# Patient Record
Sex: FEMALE | Race: WHITE | NOT HISPANIC OR LATINO | Employment: OTHER | URBAN - METROPOLITAN AREA
[De-identification: names, ages, dates, MRNs, and addresses within clinical notes are randomized per-mention and may not be internally consistent; named-entity substitution may affect disease eponyms.]

---

## 2017-01-30 ENCOUNTER — TELEPHONE (OUTPATIENT)
Dept: MEDICAL GROUP | Facility: MEDICAL CENTER | Age: 62
End: 2017-01-30

## 2017-01-31 RX ORDER — AZITHROMYCIN 500 MG/1
TABLET, FILM COATED ORAL
Qty: 3 TAB | Refills: 1 | Status: SHIPPED | OUTPATIENT
Start: 2017-01-31 | End: 2018-03-01 | Stop reason: SDUPTHER

## 2017-01-31 NOTE — TELEPHONE ENCOUNTER
Was the patient seen in the last year in this department? Yes     Does patient have an active prescription for medications requested? No     Received Request Via: Pharmacy     Patient called and left a message stating that she is having dental work done and is hoping to get a prescription for an antibiotic, please. She is allergic to PCN

## 2017-03-13 ENCOUNTER — TELEPHONE (OUTPATIENT)
Dept: MEDICAL GROUP | Facility: MEDICAL CENTER | Age: 62
End: 2017-03-13

## 2017-03-14 NOTE — TELEPHONE ENCOUNTER
Is patient asking for an order for routine screening mammogram? Because I see there is an order from Dr. Carrasco done in September 2016 and I am not sure if it has been completed.  If she is asking for a diagnostic mammogram, there needs to be a suspicious lump or lesion or any other significant concern. I did not find anything like that in her chart.  If she has any concern for a lump in etc. she needs to be seen in clinic first.  Please clarify with patient.      Michael Arnold M.D.    Covering for Dr. Carrasco

## 2017-03-27 NOTE — TELEPHONE ENCOUNTER
Spoke with patient about Diagnostic Mammo order and advised Dr. Carrasco ordered a mammogram back in September. She states that she never had that mammo done in September, will fax the order for her to done.

## 2017-04-17 ENCOUNTER — HOSPITAL ENCOUNTER (OUTPATIENT)
Dept: RADIOLOGY | Facility: MEDICAL CENTER | Age: 62
End: 2017-04-17
Attending: FAMILY MEDICINE
Payer: COMMERCIAL

## 2017-04-17 DIAGNOSIS — Z13.9 SCREENING: ICD-10-CM

## 2017-04-17 PROCEDURE — 77063 BREAST TOMOSYNTHESIS BI: CPT

## 2017-05-09 ENCOUNTER — OFFICE VISIT (OUTPATIENT)
Dept: MEDICAL GROUP | Facility: MEDICAL CENTER | Age: 62
End: 2017-05-09
Payer: COMMERCIAL

## 2017-05-09 ENCOUNTER — HOSPITAL ENCOUNTER (OUTPATIENT)
Facility: MEDICAL CENTER | Age: 62
End: 2017-05-09
Attending: FAMILY MEDICINE
Payer: COMMERCIAL

## 2017-05-09 VITALS
WEIGHT: 196 LBS | HEIGHT: 65 IN | DIASTOLIC BLOOD PRESSURE: 76 MMHG | BODY MASS INDEX: 32.65 KG/M2 | OXYGEN SATURATION: 97 % | TEMPERATURE: 97.6 F | SYSTOLIC BLOOD PRESSURE: 122 MMHG | HEART RATE: 76 BPM

## 2017-05-09 DIAGNOSIS — Z12.4 SCREENING FOR CERVICAL CANCER: ICD-10-CM

## 2017-05-09 DIAGNOSIS — Z12.11 SCREENING FOR COLON CANCER: ICD-10-CM

## 2017-05-09 DIAGNOSIS — Z01.419 WELL WOMAN EXAM WITH ROUTINE GYNECOLOGICAL EXAM: ICD-10-CM

## 2017-05-09 DIAGNOSIS — E66.9 OBESITY (BMI 30-39.9): ICD-10-CM

## 2017-05-09 PROCEDURE — 99396 PREV VISIT EST AGE 40-64: CPT | Performed by: FAMILY MEDICINE

## 2017-05-09 PROCEDURE — 88175 CYTOPATH C/V AUTO FLUID REDO: CPT

## 2017-05-09 PROCEDURE — 87624 HPV HI-RISK TYP POOLED RSLT: CPT

## 2017-05-09 NOTE — MR AVS SNAPSHOT
"        Corrinne L Amador   2017 9:40 AM   Office Visit   MRN: [id number]    Department:  [facility]   Dept Phone:  [phone]    Description:  Female : 1955   Provider:  Silvia Carrasco M.D.           Reason for Visit     Gynecologic Exam           Allergies as of 2017     Allergen Noted Reactions    Gluten 12/15/2012       GI upset    Pcn [Penicillins] 12/15/2012   Rash    Tolerates Ancef.    Percocet [Oxycodone-Acetaminophen] 12/15/2012   Unspecified    migraine  migraine      You were diagnosed with     Well woman exam with routine gynecological exam   [541406]       Screening for cervical cancer   [553985]       Screening for colon cancer   [519809]       Obesity (BMI 30-39.9)   [670433]         Vital Signs     Blood Pressure Pulse Temperature Height Weight Body Mass Index    122/76 mmHg 76 36.4 °C (97.6 °F) 1.651 m (5' 5\") 88.905 kg (196 lb) 32.62 kg/m2    Oxygen Saturation Breastfeeding? Smoking Status             97% No Former Smoker         Basic Information     Date Of Birth Sex Race Ethnicity Preferred Language    1955 Female White Non- English      Your appointments     May 23, 2017 10:00 AM   DEXA/CONSULT-45 MIN with Shahab Fink M.D., [facility] (--)    [address] [place] [id number] [phone]              Problem List              ICD-10-CM Priority Class Noted - Resolved    Shoulder impingement M75.40   2014 - Present    S/P rotator cuff repair Z98.890   2014 - Present    Chest pain R07.9   7/10/2014 - Present    Hypokalemia E87.6   2014 - Present    Asthma J45.909   2014 - Present    Hypothyroid E03.9   2014 - Present    Knee osteoarthritis M17.10   2014 - Present    Actinic keratosis L57.0   2016 - Present    Endometriosis of vagina N80.4   2016 - Present    Lymphocytic thyroiditis E06.3   2016 - Present    Renal colic N23   2016 - Present   " Hypothyroidism E03.9   9/19/2016 - Present    Vitamin D deficiency E55.9   9/19/2016 - Present    Hyperlipidemia E78.5   9/19/2016 - Present    Changing pigmented skin lesion D22.9   9/19/2016 - Present    Migraine without aura and without status migrainosus, not intractable G43.009   9/19/2016 - Present    HSV infection B00.9   9/19/2016 - Present    Obesity (BMI 30-39.9) E66.9   5/9/2017 - Present      Health Maintenance        Date Due Completion Dates    IMM DTaP/Tdap/Td Vaccine (1 - Tdap) 2/6/1975 ---    PAP SMEAR 2/6/1977 ---    COLONOSCOPY 2/6/2006 ---    IMM ZOSTER VACCINE 2/6/2016 ---    MAMMOGRAM 4/17/2018 4/17/2017            Current Immunizations     13-VALENT PCV PREVNAR 10/25/2015  1:00 PM    Influenza TIV (IM) 12/11/2013    Influenza Vaccine Quad Inj (Preserved) 10/25/2016    Pneumococcal polysaccharide vaccine (PPSV-23) 7/11/2005      Below and/or attached are the medications your provider expects you to take. Review all of your home medications and newly ordered medications with your provider and/or pharmacist. Follow medication instructions as directed by your provider and/or pharmacist. Please keep your medication list with you and share with your provider. Update the information when medications are discontinued, doses are changed, or new medications (including over-the-counter products) are added; and carry medication information at all times in the event of emergency situations     Allergies:  GLUTEN - (reactions not documented)     PCN - Rash     PERCOCET - Unspecified               Medications  Valid as of: May 09, 2017 - 10:40 AM    Generic Name Brand Name Tablet Size Instructions for use    Acyclovir (Tab) ZOVIRAX 400 MG Take 1 Tab by mouth 3 times a day.        Albuterol Sulfate (Aero Soln) albuterol 108 (90 BASE) MCG/ACT Inhale 2 Puffs by mouth every four hours as needed for Shortness of Breath (cough and wheezing).        Azithromycin (Tab) ZITHROMAX 500 MG One po 30 minutes prior to  dental procedure        Calcium Citrate-Vitamin D   Take  by mouth.        Cholecalciferol (Tab) cholecalciferol 1000 UNIT Take 2,000 Units by mouth every day.        Levothyroxine Sodium (Tab) SYNTHROID 150 MCG Take 1 Tab by mouth Every morning on an empty stomach.        SUMAtriptan Succinate (Tab) IMITREX 50 MG Take 1 Tab by mouth Once PRN for Migraine for up to 1 dose.        .                 Medicines prescribed today were sent to:     [facility] [place], [place] - [address]    [address] [place] [address]    Phone: [phone] Fax: [phone]    Open 24 Hours?: No      Medication refill instructions:       If your prescription bottle indicates you have medication refills left, it is not necessary to call your provider’s office. Please contact your pharmacy and they will refill your medication.    If your prescription bottle indicates you do not have any refills left, you may request refills at any time through one of the following ways: The online [employer] system (except Urgent Care), by calling your provider’s office, or by asking your pharmacy to contact your provider’s office with a refill request. Medication refills are processed only during regular business hours and may not be available until the next business day. Your provider may request additional information or to have a follow-up visit with you prior to refilling your medication.   *Please Note: Medication refills are assigned a new Rx number when refilled electronically. Your pharmacy may indicate that no refills were authorized even though a new prescription for the same medication is available at the pharmacy. Please request the medicine by name with the pharmacy before contacting your provider for a refill.        Your To Do List     Future Labs/Procedures Complete By Expires    THINPREP PAP WITH HPV  As directed 5/10/2018      Referral     A referral request has been sent to our patient care coordination  department. Please allow 3-5 business days for us to process this request and contact you either by phone or mail. If you do not hear from us by the 5th business day, please call us at [phone].           [employer] Access Code: Activation code not generated  Current [employer] Status: Active

## 2017-05-09 NOTE — PROGRESS NOTES
SUBJECTIVE:   Chief Complaint   Patient presents with   • Gynecologic Exam       62 y.o. female for annual routine gynecologic exam    Obstetric History       T0      TAB0   SAB0   E0   M0   L1       History   Sexual Activity   • Sexual Activity:   • Partners: Male       Last Pap: 5 years ago  HPV testing unknown  H/O Abnormal Pap no  No significant bloating/fluid retention, pelvic pain, or dyspareunia. No vaginal discharge   She does perform regular self breast exams.  No breast tenderness, mass, nipple discharge, changes in size or contour, or abnormal cyclic discomfort.        ROS:    No urinary tract symptoms, no incontinence.   No abdominal pain, change in bowel habits, black or bloody stools.    No unusual headaches, no visual changes, menstrual migraines   No prolonged cough. No dyspnea or chest pain on exertion.  No depression, labile mood, anxiety ,libido changes, insomnia.  No new/concerning skin lesions  Had an ankle fracture in February but is doing better    Exercise: moderate regular exercise program    Social History   Substance Use Topics   • Smoking status: Former Smoker -- 0.25 packs/day for 10 years     Types: Cigarettes     Quit date: 1980   • Smokeless tobacco: Never Used   • Alcohol Use: 2.5 oz/week     5 Standard drinks or equivalent per week      Comment: occas       Patient Active Problem List    Diagnosis Date Noted   • Obesity (BMI 30-39.9) 2017   • Actinic keratosis 2016   • Endometriosis of vagina 2016   • Lymphocytic thyroiditis 2016   • Renal colic 2016   • Hypothyroidism 2016   • Vitamin D deficiency 2016   • Hyperlipidemia 2016   • Changing pigmented skin lesion 2016   • Migraine without aura and without status migrainosus, not intractable 2016   • HSV infection 2016   • Knee osteoarthritis 2014   • Hypokalemia 2014   • Asthma 2014   • Hypothyroid 2014   • Chest pain  07/10/2014   • S/P rotator cuff repair 2014   • Shoulder impingement 2014       Past Medical History   Diagnosis Date   • ASTHMA    • Hyperthyroidism    • Arthritis    • Psychiatric problem    • Renal disorder    • Chest pain 7/10/2014   • Anesthesia      wakes up manic   • Breath shortness      asthma related   • Migraine without aura and without status migrainosus, not intractable 2016   • Asthma 2014   • Endometriosis of vagina 2016     Overview:  AREA NOT SPECIFIED    • H/O migraine 2014   • HSV infection 2016   • Hyperlipidemia 2016   • Lymphocytic thyroiditis 2016   • Knee osteoarthritis 2014   • Renal colic 2016   • Rotator cuff tear 2014   • Vitamin D deficiency 2016     Past Surgical History   Procedure Laterality Date   • Shoulder arthroscopy     • Hannah by laparoscopy     • Pr anesth,yudelka del after neuraxial anesth     • Knee arthroplasty total  2014     Performed by Leeroy Camacho M.D. at SURGERY [facility]   • Knee arthroplasty total Left 10/23/2015     Procedure: LEFT TOTAL KNEE REPLACEMENT;  Surgeon: Leeroy Camacho M.D.;  Location: SURGERY [facility];  Service:          Family History   Problem Relation Age of Onset   • Diabetes Mother    • Arthritis Mother    • Lung Disease Mother      COPD   • Cancer Mother      cervical   • Heart Disease Father    • Alcohol/Drug Father    • Cancer Sister      thyroid   • Heart Disease Brother      Family Status   Relation Status Death Age   • Mother     • Father     • Sister Alive    • Brother Alive    • Sister Alive    • Sister Alive          Current medicines (including changes today)  Current Outpatient Prescriptions   Medication Sig Dispense Refill   • Calcium Citrate-Vitamin D (CALCIUM + D PO) Take  by mouth.     • levothyroxine (SYNTHROID) 150 MCG Tab Take 1 Tab by mouth Every morning on an empty stomach. 90 Tab 3   • acyclovir (ZOVIRAX) 400 MG tablet Take 1 Tab by mouth 3  "times a day. 30 Tab 3   • sumatriptan (IMITREX) 50 MG Tab Take 1 Tab by mouth Once PRN for Migraine for up to 1 dose. 10 Tab 3   • vitamin D (CHOLECALCIFEROL) 1000 UNIT Tab Take 2,000 Units by mouth every day.     • albuterol (VENTOLIN OR PROVENTIL) 108 (90 BASE) MCG/ACT AERS inhalation aerosol Inhale 2 Puffs by mouth every four hours as needed for Shortness of Breath (cough and wheezing). 1 Inhaler 1   • azithromycin (ZITHROMAX) 500 MG tablet One po 30 minutes prior to dental procedure 3 Tab 1     No current facility-administered medications for this visit.           Allergies: Gluten; Pcn; and Percocet     Preventive Care:  Health Maintenance Topics with due status: Overdue       Topic Date Due    PFT SCREENING-FEV1 AND FEV/FVC RATIO / SPIROMETRY SHOULD BE PERFORMED ANNUALLY 02/06/1974    IMM DTaP/Tdap/Td Vaccine 02/06/1975    PAP SMEAR 02/06/1977    COLONOSCOPY 02/06/2006    IMM ZOSTER VACCINE 02/06/2016       OBJECTIVE:   /76 mmHg  Pulse 76  Temp(Src) 36.4 °C (97.6 °F)  Ht 1.651 m (5' 5\")  Wt 88.905 kg (196 lb)  BMI 32.62 kg/m2  SpO2 97%  Breastfeeding? No  Body mass index is 32.62 kg/(m^2).      Gen: Well developed, well nourished in no acute distress.   Skin: Pink, warm, and dry. No rashes  Eyes: conjunctiva non-injected, sclera non-icteric. EOMs intact.   Nasal mucosa without edema nor erythema. No facial tenderness  Ears:Pinna normal. TM pearly gray.   Nose: Nares patent.  No discharge.  Oral mucous membranes pink and moist with no lesions.  Neck: Supple, trachea midline. No adenopathy or masses in the neck or supraclavicular regions. No thyromegaly.  Lungs: Effort is normal. Clear to auscultation bilaterally with good excursion.  CV: regular rate and rhythm. No murmur.  Abdomen: soft, nontender, + BS. No HSM.  No CVAT  Ext: no edema, color normal, vascularity normal, temperature normal  Alert and oriented Eye contact is good, speech goal directed, affect calm     Breast Exam: Performed with " instruction during examination. No axillary lymphadenopathy, no skin changes, no dominant masses. No nipple retraction  Pelvic Exam:  Normal external genitalia with no lesions. Normal vaginal mucosa with normal rugation and no discharge. Cervix with no visible lesions. No cervical motion tenderness. Uterus is normal sized with no masses. No adnexal tenderness or enlargement appreciated. Pap is obtained and the specimen was sent to lab  Rectal: Good tone, heme negative. No masses.    <ASSESSMENT and PLAN>  1. Well woman exam with routine gynecological exam     2. Screening for cervical cancer  THINPREP PAP WITH HPV   3. Screening for colon cancer  REFERRAL TO GASTROENTEROLOGY   4. Obesity (BMI 30-39.9)  Patient identified as having weight management issue.  Appropriate orders and counseling given.       Discussed  breast self exam, menopause, osteoporosis, adequate intake of calcium and vitamin D, diet and exercise     Follow-up in 1 years for next Gyn exam and 3 years for next Pap.   Return in about 1 year (around 5/9/2018).

## 2017-05-11 LAB
CYTOLOGY REG CYTOL: NORMAL
HPV HR 12 DNA CVX QL NAA+PROBE: NEGATIVE
HPV16 DNA SPEC QL NAA+PROBE: NEGATIVE
HPV18 DNA SPEC QL NAA+PROBE: NEGATIVE
SPECIMEN SOURCE: NORMAL

## 2017-09-07 DIAGNOSIS — J20.9 ACUTE BRONCHITIS WITH BRONCHOSPASM: ICD-10-CM

## 2017-09-07 RX ORDER — ALBUTEROL SULFATE 90 UG/1
2 AEROSOL, METERED RESPIRATORY (INHALATION) EVERY 4 HOURS PRN
Qty: 1 INHALER | Refills: 0 | Status: SHIPPED | OUTPATIENT
Start: 2017-09-07 | End: 2018-10-09 | Stop reason: SDUPTHER

## 2017-10-10 DIAGNOSIS — E03.9 ACQUIRED HYPOTHYROIDISM: ICD-10-CM

## 2017-10-11 RX ORDER — LEVOTHYROXINE SODIUM 0.15 MG/1
150 TABLET ORAL
Qty: 90 TAB | Refills: 1 | Status: SHIPPED | OUTPATIENT
Start: 2017-10-11 | End: 2018-04-05 | Stop reason: SDUPTHER

## 2017-10-18 DIAGNOSIS — B00.9 HSV INFECTION: ICD-10-CM

## 2017-10-19 RX ORDER — ACYCLOVIR 400 MG/1
400 TABLET ORAL 3 TIMES DAILY
Qty: 30 TAB | Refills: 3 | Status: SHIPPED | OUTPATIENT
Start: 2017-10-19 | End: 2018-12-21 | Stop reason: SDUPTHER

## 2018-03-01 RX ORDER — AZITHROMYCIN 500 MG/1
TABLET, FILM COATED ORAL
Qty: 3 TAB | Refills: 1 | Status: SHIPPED
Start: 2018-03-01 | End: 2020-05-14

## 2018-03-01 NOTE — TELEPHONE ENCOUNTER
Was the patient seen in the last year in this department? Yes     Does patient have an active prescription for medications requested? No     Received Request Via: Patient     Pt is having Dental work on Monday and is needing this to pre-treat. Pt had updated her pharmacy on file, but Rx went to previous pharmacy on file.

## 2018-04-05 DIAGNOSIS — E03.9 ACQUIRED HYPOTHYROIDISM: ICD-10-CM

## 2018-04-05 RX ORDER — LEVOTHYROXINE SODIUM 0.15 MG/1
150 TABLET ORAL
Qty: 90 TAB | Refills: 0 | Status: SHIPPED | OUTPATIENT
Start: 2018-04-05 | End: 2018-07-06 | Stop reason: SDUPTHER

## 2018-05-01 ENCOUNTER — TELEPHONE (OUTPATIENT)
Dept: MEDICAL GROUP | Facility: MEDICAL CENTER | Age: 63
End: 2018-05-01

## 2018-05-01 ENCOUNTER — HOSPITAL ENCOUNTER (OUTPATIENT)
Dept: RADIOLOGY | Facility: MEDICAL CENTER | Age: 63
End: 2018-05-01
Attending: NURSE PRACTITIONER
Payer: COMMERCIAL

## 2018-05-01 ENCOUNTER — OFFICE VISIT (OUTPATIENT)
Dept: MEDICAL GROUP | Facility: MEDICAL CENTER | Age: 63
End: 2018-05-01
Payer: COMMERCIAL

## 2018-05-01 VITALS
TEMPERATURE: 97.9 F | HEART RATE: 87 BPM | DIASTOLIC BLOOD PRESSURE: 78 MMHG | HEIGHT: 65 IN | SYSTOLIC BLOOD PRESSURE: 104 MMHG | WEIGHT: 207 LBS | OXYGEN SATURATION: 96 % | BODY MASS INDEX: 34.49 KG/M2

## 2018-05-01 DIAGNOSIS — M25.571 ACUTE RIGHT ANKLE PAIN: ICD-10-CM

## 2018-05-01 PROCEDURE — 73630 X-RAY EXAM OF FOOT: CPT | Mod: RT

## 2018-05-01 PROCEDURE — 73610 X-RAY EXAM OF ANKLE: CPT | Mod: RT

## 2018-05-01 PROCEDURE — 99214 OFFICE O/P EST MOD 30 MIN: CPT | Performed by: NURSE PRACTITIONER

## 2018-05-01 ASSESSMENT — PATIENT HEALTH QUESTIONNAIRE - PHQ9: CLINICAL INTERPRETATION OF PHQ2 SCORE: 0

## 2018-05-01 NOTE — TELEPHONE ENCOUNTER
Please let pt know that the foot and ankle xray does not show a fx. There is OA.  If its going to be several weeks to get into ortho, let me know and we can consider ankle MRI.

## 2018-05-01 NOTE — PROGRESS NOTES
Subjective:     Corrinne L Cibulsky is a 63 y.o. female who presents with rt ankle pain.    HPI:   Seen in f/u for rt ankle pain.  She had sudden onset of pain in the ankle about 1 month ago.  No hx of recent injury.  Did fx in 2/17.  She has been really careful with the foot and ankle since the fx.  Did not need surgery.  It was work comp injury. That had healed.  The pain recently restarted w/o injury.  There is swelling.  The lateral ankle and midfoot is very tender.  Pain with pressure on the foot.  Not getting better.    Otherwise feel well.      Patient Active Problem List    Diagnosis Date Noted   • Obesity (BMI 30-39.9) 05/09/2017   • Actinic keratosis 09/19/2016   • Endometriosis of vagina 09/19/2016   • Lymphocytic thyroiditis 09/19/2016   • Renal colic 09/19/2016   • Hypothyroidism 09/19/2016   • Vitamin D deficiency 09/19/2016   • Hyperlipidemia 09/19/2016   • Changing pigmented skin lesion 09/19/2016   • Migraine without aura and without status migrainosus, not intractable 09/19/2016   • HSV infection 09/19/2016   • Knee osteoarthritis 09/12/2014   • Hypokalemia 07/11/2014   • Asthma 07/11/2014   • Hypothyroid 07/11/2014   • Chest pain 07/10/2014   • S/P rotator cuff repair 02/27/2014   • Shoulder impingement 01/16/2014       Current medicines (including changes today)  Current Outpatient Prescriptions   Medication Sig Dispense Refill   • levothyroxine (SYNTHROID) 150 MCG Tab Take 1 Tab by mouth Every morning on an empty stomach. 90 Tab 0   • azithromycin (ZITHROMAX) 500 MG tablet One po 30 minutes prior to dental procedure 3 Tab 1   • acyclovir (ZOVIRAX) 400 MG tablet Take 1 Tab by mouth 3 times a day. 30 Tab 3   • albuterol 108 (90 Base) MCG/ACT Aero Soln inhalation aerosol Inhale 2 Puffs by mouth every four hours as needed for Shortness of Breath (cough and wheezing). 1 Inhaler 0   • Calcium Citrate-Vitamin D (CALCIUM + D PO) Take  by mouth.     • sumatriptan (IMITREX) 50 MG Tab Take 1 Tab by mouth  "Once PRN for Migraine for up to 1 dose. 10 Tab 3   • vitamin D (CHOLECALCIFEROL) 1000 UNIT Tab Take 2,000 Units by mouth every day.       No current facility-administered medications for this visit.        Allergies   Allergen Reactions   • Gluten      GI upset   • Pcn [Penicillins] Rash     Tolerates Ancef.   • Percocet [Oxycodone-Acetaminophen] Unspecified     migraine  migraine       ROS  Constitutional: Negative. Negative for fever, chills, weight loss, malaise/fatigue and diaphoresis.   HENT: Negative. Negative for hearing loss, ear pain, nosebleeds, congestion, sore throat, neck pain, tinnitus and ear discharge.   Respiratory: Negative. Negative for cough, hemoptysis, sputum production, shortness of breath, wheezing and stridor.   Cardiovascular: Negative. Negative for chest pain, palpitations, orthopnea, claudication, leg swelling and PND.        Objective:     Blood pressure 104/78, pulse 87, temperature 36.6 °C (97.9 °F), height 1.648 m (5' 4.9\"), weight 93.9 kg (207 lb), SpO2 96 %, not currently breastfeeding. Body mass index is 34.55 kg/m².    Physical Exam:  Vitals reviewed.  Constitutional: Oriented to person, place, and time. appears well-developed and well-nourished. No distress.   Cardiovascular: Normal rate, regular rhythm, normal heart sounds and intact distal pulses. Exam reveals no gallop and no friction rub. No murmur heard. No carotid bruits.   Pulmonary/Chest: Effort normal and breath sounds normal. No stridor. No respiratory distress. no wheezes or rales. exhibits no tenderness.   Musculoskeletal: Normal range of motion. exhibits no edema. ankur pedal pulses 2+.  Neurological: Alert and oriented to person, place, and time. exhibits normal muscle tone.  Skin: Skin is warm and dry. No diaphoresis.  Mild edema and tender to palp in rt lateral malleolus and midfoot.  Sensation intact.    Psychiatric: Normal mood and affect. Behavior is normal.      Assessment and Plan:     The following treatment " plan was discussed:    1. Acute right ankle pain  DX-ANKLE 3+ VIEWS RIGHT    DX-FOOT-COMPLETE 3+ RIGHT    REFERRAL TO ORTHOPEDICS    xray rt ankle and foot d/t lateral malleolus and midfoot pain and swelling.  refer ortho.  f/u with pt with all test results.     2. BMI 34.0-34.9,adult  Patient identified as having weight management issue.  Appropriate orders and counseling given.         Followup: Return if symptoms worsen or fail to improve.

## 2018-07-02 DIAGNOSIS — E03.9 ACQUIRED HYPOTHYROIDISM: ICD-10-CM

## 2018-07-05 RX ORDER — LEVOTHYROXINE SODIUM 150 MCG
TABLET ORAL
Qty: 30 TAB | OUTPATIENT
Start: 2018-07-05

## 2018-07-06 DIAGNOSIS — E03.9 ACQUIRED HYPOTHYROIDISM: ICD-10-CM

## 2018-07-08 RX ORDER — LEVOTHYROXINE SODIUM 150 MCG
TABLET ORAL
Qty: 30 TAB | Refills: 0 | Status: SHIPPED | OUTPATIENT
Start: 2018-07-08 | End: 2018-08-05 | Stop reason: SDUPTHER

## 2018-07-11 ENCOUNTER — HOSPITAL ENCOUNTER (OUTPATIENT)
Dept: LAB | Facility: MEDICAL CENTER | Age: 63
End: 2018-07-11
Attending: FAMILY MEDICINE
Payer: COMMERCIAL

## 2018-07-11 LAB
T4 FREE SERPL-MCNC: 1.03 NG/DL (ref 0.53–1.43)
TSH SERPL DL<=0.005 MIU/L-ACNC: 5.51 UIU/ML (ref 0.38–5.33)

## 2018-07-11 PROCEDURE — 36415 COLL VENOUS BLD VENIPUNCTURE: CPT

## 2018-07-11 PROCEDURE — 84439 ASSAY OF FREE THYROXINE: CPT

## 2018-07-11 PROCEDURE — 84443 ASSAY THYROID STIM HORMONE: CPT

## 2018-07-18 ENCOUNTER — OFFICE VISIT (OUTPATIENT)
Dept: MEDICAL GROUP | Facility: MEDICAL CENTER | Age: 63
End: 2018-07-18
Payer: COMMERCIAL

## 2018-07-18 VITALS
DIASTOLIC BLOOD PRESSURE: 70 MMHG | BODY MASS INDEX: 34.66 KG/M2 | HEIGHT: 65 IN | TEMPERATURE: 98.5 F | WEIGHT: 208 LBS | HEART RATE: 82 BPM | OXYGEN SATURATION: 94 % | SYSTOLIC BLOOD PRESSURE: 120 MMHG

## 2018-07-18 DIAGNOSIS — N95.9 POST MENOPAUSAL PROBLEMS: ICD-10-CM

## 2018-07-18 DIAGNOSIS — Z12.31 ENCOUNTER FOR SCREENING MAMMOGRAM FOR MALIGNANT NEOPLASM OF BREAST: ICD-10-CM

## 2018-07-18 DIAGNOSIS — R94.6 ABNORMAL THYROID FUNCTION TEST: ICD-10-CM

## 2018-07-18 DIAGNOSIS — E03.9 HYPOTHYROIDISM (ACQUIRED): ICD-10-CM

## 2018-07-18 DIAGNOSIS — Z86.39 HISTORY OF THYROID NODULE: ICD-10-CM

## 2018-07-18 PROCEDURE — 99214 OFFICE O/P EST MOD 30 MIN: CPT | Performed by: NURSE PRACTITIONER

## 2018-07-18 NOTE — PROGRESS NOTES
Subjective:     Corrinne Lynne Cibulsky is a 63 y.o. female who presents with hypothyroidism.    HPI:   Seen in f/u for hypothyroidism.  She is feeling fatigued.  She is having weight gain.  She is noting dry skin and hair.  These are associated with her thyroid.  She has a hx of small thyroid nodule. No recheck long term.    She just recovered from bronchitis.   Reviewed lab with pt.  Her TSH  Is mildly elevated.  T4 is wnl.    Patient Active Problem List    Diagnosis Date Noted   • Obesity (BMI 30-39.9) 05/09/2017   • Actinic keratosis 09/19/2016   • Endometriosis of vagina 09/19/2016   • Lymphocytic thyroiditis 09/19/2016   • Renal colic 09/19/2016   • Hypothyroidism 09/19/2016   • Vitamin D deficiency 09/19/2016   • Hyperlipidemia 09/19/2016   • Changing pigmented skin lesion 09/19/2016   • Migraine without aura and without status migrainosus, not intractable 09/19/2016   • HSV infection 09/19/2016   • Knee osteoarthritis 09/12/2014   • Hypokalemia 07/11/2014   • Asthma 07/11/2014   • Hypothyroid 07/11/2014   • Chest pain 07/10/2014   • S/P rotator cuff repair 02/27/2014   • Shoulder impingement 01/16/2014       Current medicines (including changes today)  Current Outpatient Prescriptions   Medication Sig Dispense Refill   • SYNTHROID 150 MCG Tab TAKE ONE TABLET BY MOUTH EVERY MORNING ON AN EMPTY STOMACH 30 Tab 0   • azithromycin (ZITHROMAX) 500 MG tablet One po 30 minutes prior to dental procedure 3 Tab 1   • acyclovir (ZOVIRAX) 400 MG tablet Take 1 Tab by mouth 3 times a day. 30 Tab 3   • albuterol 108 (90 Base) MCG/ACT Aero Soln inhalation aerosol Inhale 2 Puffs by mouth every four hours as needed for Shortness of Breath (cough and wheezing). 1 Inhaler 0   • Calcium Citrate-Vitamin D (CALCIUM + D PO) Take  by mouth.     • sumatriptan (IMITREX) 50 MG Tab Take 1 Tab by mouth Once PRN for Migraine for up to 1 dose. 10 Tab 3   • vitamin D (CHOLECALCIFEROL) 1000 UNIT Tab Take 2,000 Units by mouth every day.    "    No current facility-administered medications for this visit.        Allergies   Allergen Reactions   • Gluten      GI upset   • Pcn [Penicillins] Rash     Tolerates Ancef.   • Percocet [Oxycodone-Acetaminophen] Unspecified     migraine  migraine       ROS  Constitutional: Negative. Negative for fever, chills, weight loss, and diaphoresis.   HENT: Negative. Negative for hearing loss, ear pain, nosebleeds, congestion, sore throat, neck pain, tinnitus and ear discharge.   Respiratory: Negative. Negative for cough, hemoptysis, sputum production, shortness of breath, wheezing and stridor.   Cardiovascular: Negative. Negative for chest pain, palpitations, orthopnea, claudication, leg swelling and PND.   Gastrointestinal: Denies nausea, vomiting, diarrhea, constipation, heartburn, melena or hematochezia.  Genitourinary: Denies dysuria, hematuria, urinary incontinence, frequency or urgency.        Objective:     Blood pressure 120/70, pulse 82, temperature 36.9 °C (98.5 °F), height 1.648 m (5' 4.9\"), weight 94.3 kg (208 lb), SpO2 94 %, not currently breastfeeding. Body mass index is 34.72 kg/m².    Physical Exam:  Vitals reviewed.  Constitutional: Oriented to person, place, and time. appears well-developed and well-nourished. No distress.   Cardiovascular: Normal rate, regular rhythm, normal heart sounds and intact distal pulses. Exam reveals no gallop and no friction rub. No murmur heard. No carotid bruits.   Pulmonary/Chest: Effort normal and breath sounds normal. No stridor. No respiratory distress. no wheezes or rales. exhibits no tenderness.   Musculoskeletal: Normal range of motion. exhibits no edema. ankur pedal pulses 2+.  Lymphadenopathy: No cervical or supraclavicular adenopathy.   Neurological: Alert and oriented to person, place, and time. exhibits normal muscle tone.  Skin: Skin is warm and dry. No diaphoresis.   Psychiatric: Normal mood and affect. Behavior is normal.      Assessment and Plan:     The " following treatment plan was discussed:    1. Hypothyroidism (acquired)  TSH+FREE T4    THYROID PEROXIDASE  (TPO) AB    US-SOFT TISSUES OF HEAD - NECK    inc synthroid to 1 tab 6x/wk and 1.5 tab 1x/wk.  recheck lab 2 months.  f/u withpt with results.  do neck us.  f/u for pt with results.    2. Abnormal thyroid function test  TSH+FREE T4    THYROID PEROXIDASE  (TPO) AB    US-SOFT TISSUES OF HEAD - NECK    f/u with PCP as sched   3. History of thyroid nodule  TSH+FREE T4    THYROID PEROXIDASE  (TPO) AB    US-SOFT TISSUES OF HEAD - NECK   4. Encounter for screening mammogram for malignant neoplasm of breast  MA-SCREEN MAMMO W/CAD-BILAT   5. Post menopausal problems  DS-BONE DENSITY STUDY (DEXA)         Followup: Return if symptoms worsen or fail to improve.

## 2018-08-05 DIAGNOSIS — E03.9 ACQUIRED HYPOTHYROIDISM: ICD-10-CM

## 2018-08-07 RX ORDER — LEVOTHYROXINE SODIUM 150 MCG
TABLET ORAL
Qty: 30 TAB | Refills: 3 | Status: SHIPPED | OUTPATIENT
Start: 2018-08-07 | End: 2018-11-27 | Stop reason: SDUPTHER

## 2018-10-02 ENCOUNTER — HOSPITAL ENCOUNTER (OUTPATIENT)
Dept: LAB | Facility: MEDICAL CENTER | Age: 63
End: 2018-10-02
Attending: NURSE PRACTITIONER
Payer: COMMERCIAL

## 2018-10-02 LAB
T4 FREE SERPL-MCNC: 1.33 NG/DL (ref 0.53–1.43)
TSH SERPL DL<=0.005 MIU/L-ACNC: 2.11 UIU/ML (ref 0.38–5.33)

## 2018-10-02 PROCEDURE — 36415 COLL VENOUS BLD VENIPUNCTURE: CPT

## 2018-10-02 PROCEDURE — 84439 ASSAY OF FREE THYROXINE: CPT

## 2018-10-02 PROCEDURE — 86376 MICROSOMAL ANTIBODY EACH: CPT

## 2018-10-02 PROCEDURE — 84443 ASSAY THYROID STIM HORMONE: CPT

## 2018-10-03 ENCOUNTER — TELEPHONE (OUTPATIENT)
Dept: MEDICAL GROUP | Facility: MEDICAL CENTER | Age: 63
End: 2018-10-03

## 2018-10-03 LAB — THYROPEROXIDASE AB SERPL-ACNC: 0.6 IU/ML (ref 0–9)

## 2018-10-09 ENCOUNTER — HOSPITAL ENCOUNTER (OUTPATIENT)
Dept: LAB | Facility: MEDICAL CENTER | Age: 63
End: 2018-10-09
Attending: FAMILY MEDICINE
Payer: COMMERCIAL

## 2018-10-09 ENCOUNTER — OFFICE VISIT (OUTPATIENT)
Dept: MEDICAL GROUP | Facility: MEDICAL CENTER | Age: 63
End: 2018-10-09
Payer: COMMERCIAL

## 2018-10-09 VITALS
BODY MASS INDEX: 33.66 KG/M2 | SYSTOLIC BLOOD PRESSURE: 126 MMHG | DIASTOLIC BLOOD PRESSURE: 68 MMHG | TEMPERATURE: 97.2 F | WEIGHT: 202 LBS | HEIGHT: 65 IN | OXYGEN SATURATION: 96 % | HEART RATE: 89 BPM

## 2018-10-09 DIAGNOSIS — R10.13 EPIGASTRIC PAIN: ICD-10-CM

## 2018-10-09 LAB
AMYLASE SERPL-CCNC: 29 U/L (ref 20–103)
BASOPHILS # BLD AUTO: 0.6 % (ref 0–1.8)
BASOPHILS # BLD: 0.03 K/UL (ref 0–0.12)
EOSINOPHIL # BLD AUTO: 0.09 K/UL (ref 0–0.51)
EOSINOPHIL NFR BLD: 1.9 % (ref 0–6.9)
ERYTHROCYTE [DISTWIDTH] IN BLOOD BY AUTOMATED COUNT: 41.7 FL (ref 35.9–50)
HCT VFR BLD AUTO: 44.6 % (ref 37–47)
HGB BLD-MCNC: 14.8 G/DL (ref 12–16)
IMM GRANULOCYTES # BLD AUTO: 0.01 K/UL (ref 0–0.11)
IMM GRANULOCYTES NFR BLD AUTO: 0.2 % (ref 0–0.9)
LIPASE SERPL-CCNC: 28 U/L (ref 11–82)
LYMPHOCYTES # BLD AUTO: 1.56 K/UL (ref 1–4.8)
LYMPHOCYTES NFR BLD: 32.6 % (ref 22–41)
MCH RBC QN AUTO: 30.6 PG (ref 27–33)
MCHC RBC AUTO-ENTMCNC: 33.2 G/DL (ref 33.6–35)
MCV RBC AUTO: 92.1 FL (ref 81.4–97.8)
MONOCYTES # BLD AUTO: 0.45 K/UL (ref 0–0.85)
MONOCYTES NFR BLD AUTO: 9.4 % (ref 0–13.4)
NEUTROPHILS # BLD AUTO: 2.65 K/UL (ref 2–7.15)
NEUTROPHILS NFR BLD: 55.3 % (ref 44–72)
NRBC # BLD AUTO: 0 K/UL
NRBC BLD-RTO: 0 /100 WBC
PLATELET # BLD AUTO: 219 K/UL (ref 164–446)
PMV BLD AUTO: 10.6 FL (ref 9–12.9)
RBC # BLD AUTO: 4.84 M/UL (ref 4.2–5.4)
WBC # BLD AUTO: 4.8 K/UL (ref 4.8–10.8)

## 2018-10-09 PROCEDURE — 83690 ASSAY OF LIPASE: CPT

## 2018-10-09 PROCEDURE — 36415 COLL VENOUS BLD VENIPUNCTURE: CPT

## 2018-10-09 PROCEDURE — 83013 H PYLORI (C-13) BREATH: CPT

## 2018-10-09 PROCEDURE — 85025 COMPLETE CBC W/AUTO DIFF WBC: CPT

## 2018-10-09 PROCEDURE — 82150 ASSAY OF AMYLASE: CPT

## 2018-10-09 PROCEDURE — 99214 OFFICE O/P EST MOD 30 MIN: CPT | Performed by: FAMILY MEDICINE

## 2018-10-09 RX ORDER — ALBUTEROL SULFATE 90 UG/1
2 AEROSOL, METERED RESPIRATORY (INHALATION) EVERY 4 HOURS PRN
Qty: 1 INHALER | Refills: 0 | Status: SHIPPED | OUTPATIENT
Start: 2018-10-09 | End: 2020-06-02 | Stop reason: SDUPTHER

## 2018-10-09 RX ORDER — OMEPRAZOLE 40 MG/1
40 CAPSULE, DELAYED RELEASE ORAL DAILY
Qty: 30 CAP | Refills: 2 | Status: SHIPPED | OUTPATIENT
Start: 2018-10-09 | End: 2021-11-03

## 2018-10-09 NOTE — ASSESSMENT & PLAN NOTE
New problem.  Pain started 2 months ago but became more severe 2 weeks ago. 6/10 constant pain, 9/10 with food. Pain has woken her up from pain. Using the hot tub helped with pain. + nausea and bloating. Radiating to her back and to lower mid abdomen. Stool has been on and off dark. Drinks about 1-2 glasses of wine daily. Father was hospitalized multiple times with ulcers, but he drank more alcohol than her. She has not used antiacids.

## 2018-10-09 NOTE — PROGRESS NOTES
Subjective:   LYNNE CORINNE CIBULSKY is a 63 y.o. female here today for epigastric pain    Epigastric pain  New problem.  Pain started 2 months ago but became more severe 2 weeks ago. 6/10 constant pain, 9/10 with food. Pain has woken her up from pain. Using the hot tub helped with pain. + nausea and bloating. Radiating to her back and to lower mid abdomen. Stool has been on and off dark. Drinks about 1-2 glasses of wine daily. Father was hospitalized multiple times with ulcers, but he drank more alcohol than her. She has not used antiacids.         Current medicines (including changes today)  Current Outpatient Prescriptions   Medication Sig Dispense Refill   • omeprazole (PRILOSEC) 40 MG delayed-release capsule Take 1 Cap by mouth every day. 30 Cap 2   • albuterol 108 (90 Base) MCG/ACT Aero Soln inhalation aerosol Inhale 2 Puffs by mouth every four hours as needed for Shortness of Breath (cough and wheezing). 1 Inhaler 0   • SYNTHROID 150 MCG Tab TAKE ONE TABLET BY MOUTH EVERY MORNING ON AN EMPTY STOMACH 30 Tab 3   • azithromycin (ZITHROMAX) 500 MG tablet One po 30 minutes prior to dental procedure 3 Tab 1   • acyclovir (ZOVIRAX) 400 MG tablet Take 1 Tab by mouth 3 times a day. 30 Tab 3   • Calcium Citrate-Vitamin D (CALCIUM + D PO) Take  by mouth.     • sumatriptan (IMITREX) 50 MG Tab Take 1 Tab by mouth Once PRN for Migraine for up to 1 dose. 10 Tab 3   • vitamin D (CHOLECALCIFEROL) 1000 UNIT Tab Take 2,000 Units by mouth every day.       No current facility-administered medications for this visit.      She  has a past medical history of Anesthesia; Arthritis; ASTHMA; Asthma (7/11/2014); Breath shortness; Chest pain (7/10/2014); Endometriosis of vagina (9/19/2016); H/O migraine (7/11/2014); HSV infection (9/19/2016); Hyperlipidemia (9/19/2016); Hyperthyroidism; Knee osteoarthritis (9/12/2014); Lymphocytic thyroiditis (9/19/2016); Migraine without aura and without status migrainosus, not intractable (9/19/2016);  "Psychiatric problem; Renal colic (9/19/2016); Renal disorder; Rotator cuff tear (1/30/2014); and Vitamin D deficiency (9/19/2016). She also has no past medical history of Arrhythmia; Bronchitis; Cancer (HCC); Cataract; Cold; Congestive heart failure (HCC); Coughing blood; Dental disorder; Glaucoma; Heart burn; Heart valve disease; Hepatitis A; Hepatitis B; Hepatitis C; High cholesterol; Hypertension; Indigestion; Myocardial infarct (HCC); Other and unspecified angina pectoris; Other emphysema (HCC); Pacemaker; Personal history of venous thrombosis and embolism; Pneumonia; Rheumatic fever; Seizure (HCC); Sleep apnea; Snoring; Tuberculosis; Type II or unspecified type diabetes mellitus without mention of complication, not stated as uncontrolled; Unspecified hemorrhagic conditions; or Unspecified urinary incontinence.    ROS   + dizziness, + nausea, no fever       Objective:     Blood pressure 126/68, pulse 89, temperature 36.2 °C (97.2 °F), height 1.648 m (5' 4.9\"), weight 91.6 kg (202 lb), SpO2 96 %, not currently breastfeeding. Body mass index is 33.72 kg/m².   Physical Exam:  Constitutional: Alert, no distress.  Skin: Warm, dry, good turgor, no rashes in visible areas.  Eye: Equal, round and reactive, conjunctiva clear, lids normal.  Abdomen: Soft, + epigastric tenderness, no hepatosplenomegaly.  Psych: Alert and oriented x3, normal affect and mood.        Assessment and Plan:   The following treatment plan was discussed    1. Epigastric pain  Differential diagnosis includes peptic ulcer versus acute pancreatitis.  We will check lipase and amylase to rule out pancreatitis.  We will also check H. pylori breath test.  Start patient on omeprazole 40 mg daily before dinner.  Check CBC because of dark stools, otherwise vital signs are normal.  If no improvement in symptoms after 2 weeks, please notify us so we can order abdominal ultrasound and GI referral.  - CBC WITH DIFFERENTIAL; Future  - LIPASE; Future  - AMYLASE; " Future  - H. PYLORI BREATH TEST  - omeprazole (PRILOSEC) 40 MG delayed-release capsule; Take 1 Cap by mouth every day.  Dispense: 30 Cap; Refill: 2      Followup: Return if symptoms worsen or fail to improve.

## 2018-10-10 ENCOUNTER — TELEPHONE (OUTPATIENT)
Dept: MEDICAL GROUP | Facility: MEDICAL CENTER | Age: 63
End: 2018-10-10

## 2018-10-10 NOTE — TELEPHONE ENCOUNTER
Patient requesting lab results. She also wants to know how long she should be on a liquid diet for. Please advise.

## 2018-10-10 NOTE — TELEPHONE ENCOUNTER
Please notify patient that her pancreatic enzyme test came back negative, she does not have pancreatitis.  We do not have the H. pylori test yet.  She can increase her diet as tolerated.  She should also continue the omeprazole 40 mg once daily that we prescribed.  Juan Pradhan M.D.

## 2018-10-12 LAB — UREA BREATH TEST QL: NEGATIVE

## 2018-10-15 ENCOUNTER — TELEPHONE (OUTPATIENT)
Dept: MEDICAL GROUP | Facility: MEDICAL CENTER | Age: 63
End: 2018-10-15

## 2018-10-15 NOTE — TELEPHONE ENCOUNTER
----- Message from Juan Pradhan M.D. sent at 10/15/2018  7:53 AM PDT -----  Please notify patient that pancreas levels are normal.  Blood counts are also normal, she is not anemic.  Please check to see if she is improving with the omeprazole we prescribed.  Juan Pradhan M.D.

## 2018-10-15 NOTE — TELEPHONE ENCOUNTER
Phone Number Called: [phone] (home)     Message: Patient notified of results. Pt states the medication is helping with the pain but she is continuing to have dark stools and trouble eating.    Left Message for patient to call back: N\A

## 2018-10-24 ENCOUNTER — TELEPHONE (OUTPATIENT)
Dept: MEDICAL GROUP | Facility: MEDICAL CENTER | Age: 63
End: 2018-10-24

## 2018-10-24 DIAGNOSIS — R10.13 EPIGASTRIC PAIN: ICD-10-CM

## 2018-10-25 NOTE — TELEPHONE ENCOUNTER
Patient reports trouble eating and ongoing abdominal pain still. She is still taking medication but reports no change to symptoms and all tests were negative. Please advise.

## 2018-10-25 NOTE — TELEPHONE ENCOUNTER
I have ordered an abdominal ultrasound and I have placed a referral to GI.  We will call on the ultrasound results. She should her name on the schedule for GI because there might be a wait.  Juan Pradhan M.D.

## 2018-10-31 ENCOUNTER — HOSPITAL ENCOUNTER (OUTPATIENT)
Dept: RADIOLOGY | Facility: MEDICAL CENTER | Age: 63
End: 2018-10-31
Attending: FAMILY MEDICINE
Payer: COMMERCIAL

## 2018-10-31 DIAGNOSIS — R10.13 EPIGASTRIC PAIN: ICD-10-CM

## 2018-10-31 PROCEDURE — 76700 US EXAM ABDOM COMPLETE: CPT

## 2018-11-27 ENCOUNTER — HOSPITAL ENCOUNTER (OUTPATIENT)
Dept: LAB | Facility: MEDICAL CENTER | Age: 63
End: 2018-11-27
Attending: NURSE PRACTITIONER
Payer: COMMERCIAL

## 2018-11-27 DIAGNOSIS — E03.9 ACQUIRED HYPOTHYROIDISM: ICD-10-CM

## 2018-11-27 LAB
BASOPHILS # BLD AUTO: 0.5 % (ref 0–1.8)
BASOPHILS # BLD: 0.03 K/UL (ref 0–0.12)
EOSINOPHIL # BLD AUTO: 0.08 K/UL (ref 0–0.51)
EOSINOPHIL NFR BLD: 1.4 % (ref 0–6.9)
ERYTHROCYTE [DISTWIDTH] IN BLOOD BY AUTOMATED COUNT: 43 FL (ref 35.9–50)
HCT VFR BLD AUTO: 46.8 % (ref 37–47)
HGB BLD-MCNC: 14.8 G/DL (ref 12–16)
IMM GRANULOCYTES # BLD AUTO: 0.01 K/UL (ref 0–0.11)
IMM GRANULOCYTES NFR BLD AUTO: 0.2 % (ref 0–0.9)
LYMPHOCYTES # BLD AUTO: 1.57 K/UL (ref 1–4.8)
LYMPHOCYTES NFR BLD: 28.4 % (ref 22–41)
MCH RBC QN AUTO: 29.5 PG (ref 27–33)
MCHC RBC AUTO-ENTMCNC: 31.6 G/DL (ref 33.6–35)
MCV RBC AUTO: 93.2 FL (ref 81.4–97.8)
MONOCYTES # BLD AUTO: 0.41 K/UL (ref 0–0.85)
MONOCYTES NFR BLD AUTO: 7.4 % (ref 0–13.4)
NEUTROPHILS # BLD AUTO: 3.42 K/UL (ref 2–7.15)
NEUTROPHILS NFR BLD: 62.1 % (ref 44–72)
NRBC # BLD AUTO: 0 K/UL
NRBC BLD-RTO: 0 /100 WBC
PLATELET # BLD AUTO: 188 K/UL (ref 164–446)
PMV BLD AUTO: 11.1 FL (ref 9–12.9)
RBC # BLD AUTO: 5.02 M/UL (ref 4.2–5.4)
WBC # BLD AUTO: 5.5 K/UL (ref 4.8–10.8)

## 2018-11-27 PROCEDURE — 85025 COMPLETE CBC W/AUTO DIFF WBC: CPT

## 2018-11-27 PROCEDURE — 36415 COLL VENOUS BLD VENIPUNCTURE: CPT

## 2018-11-27 PROCEDURE — 80061 LIPID PANEL: CPT

## 2018-11-27 PROCEDURE — 82248 BILIRUBIN DIRECT: CPT

## 2018-11-27 PROCEDURE — 83540 ASSAY OF IRON: CPT

## 2018-11-27 PROCEDURE — 80053 COMPREHEN METABOLIC PANEL: CPT

## 2018-11-27 PROCEDURE — 83550 IRON BINDING TEST: CPT

## 2018-11-27 PROCEDURE — 82728 ASSAY OF FERRITIN: CPT

## 2018-11-27 RX ORDER — LEVOTHYROXINE SODIUM 150 MCG
TABLET ORAL
Qty: 30 TAB | Refills: 5 | Status: SHIPPED | OUTPATIENT
Start: 2018-11-27 | End: 2019-05-14 | Stop reason: SDUPTHER

## 2018-11-28 LAB
ALBUMIN SERPL BCP-MCNC: 4.3 G/DL (ref 3.2–4.9)
ALBUMIN/GLOB SERPL: 1.8 G/DL
ALP SERPL-CCNC: 61 U/L (ref 30–99)
ALT SERPL-CCNC: 15 U/L (ref 2–50)
ANION GAP SERPL CALC-SCNC: 7 MMOL/L (ref 0–11.9)
AST SERPL-CCNC: 11 U/L (ref 12–45)
BILIRUB CONJ SERPL-MCNC: <0.1 MG/DL (ref 0.1–0.5)
BILIRUB INDIRECT SERPL-MCNC: ABNORMAL MG/DL (ref 0–1)
BILIRUB SERPL-MCNC: 0.6 MG/DL (ref 0.1–1.5)
BUN SERPL-MCNC: 16 MG/DL (ref 8–22)
CALCIUM SERPL-MCNC: 9.6 MG/DL (ref 8.5–10.5)
CHLORIDE SERPL-SCNC: 108 MMOL/L (ref 96–112)
CHOLEST SERPL-MCNC: 209 MG/DL (ref 100–199)
CO2 SERPL-SCNC: 29 MMOL/L (ref 20–33)
CREAT SERPL-MCNC: 0.67 MG/DL (ref 0.5–1.4)
FASTING STATUS PATIENT QL REPORTED: NORMAL
FERRITIN SERPL-MCNC: 115.6 NG/ML (ref 10–291)
GLOBULIN SER CALC-MCNC: 2.4 G/DL (ref 1.9–3.5)
GLUCOSE SERPL-MCNC: 86 MG/DL (ref 65–99)
HDLC SERPL-MCNC: 60 MG/DL
IRON SATN MFR SERPL: 43 % (ref 15–55)
IRON SERPL-MCNC: 158 UG/DL (ref 40–170)
LDLC SERPL CALC-MCNC: 124 MG/DL
POTASSIUM SERPL-SCNC: 4.6 MMOL/L (ref 3.6–5.5)
PROT SERPL-MCNC: 6.7 G/DL (ref 6–8.2)
SODIUM SERPL-SCNC: 144 MMOL/L (ref 135–145)
TIBC SERPL-MCNC: 368 UG/DL (ref 250–450)
TRIGL SERPL-MCNC: 123 MG/DL (ref 0–149)

## 2018-12-11 ENCOUNTER — HOSPITAL ENCOUNTER (OUTPATIENT)
Dept: LAB | Facility: MEDICAL CENTER | Age: 63
End: 2018-12-11
Attending: NURSE PRACTITIONER
Payer: COMMERCIAL

## 2018-12-11 PROCEDURE — 36415 COLL VENOUS BLD VENIPUNCTURE: CPT

## 2018-12-11 PROCEDURE — 82784 ASSAY IGA/IGD/IGG/IGM EACH: CPT

## 2018-12-11 PROCEDURE — 83516 IMMUNOASSAY NONANTIBODY: CPT

## 2018-12-12 LAB — IGA SERPL-MCNC: 185 MG/DL (ref 68–408)

## 2018-12-13 LAB — TTG IGA SER IA-ACNC: 1 U/ML (ref 0–3)

## 2018-12-21 DIAGNOSIS — B00.9 HSV INFECTION: ICD-10-CM

## 2018-12-24 RX ORDER — ACYCLOVIR 400 MG/1
TABLET ORAL
Qty: 30 TAB | Refills: 3 | Status: SHIPPED | OUTPATIENT
Start: 2018-12-24 | End: 2019-10-22 | Stop reason: SDUPTHER

## 2019-02-25 ENCOUNTER — HOSPITAL ENCOUNTER (OUTPATIENT)
Dept: RADIOLOGY | Facility: MEDICAL CENTER | Age: 64
End: 2019-02-25
Attending: NURSE PRACTITIONER
Payer: COMMERCIAL

## 2019-02-25 DIAGNOSIS — Z12.31 ENCOUNTER FOR SCREENING MAMMOGRAM FOR MALIGNANT NEOPLASM OF BREAST: ICD-10-CM

## 2019-02-25 PROCEDURE — 77063 BREAST TOMOSYNTHESIS BI: CPT

## 2019-05-14 DIAGNOSIS — E03.9 ACQUIRED HYPOTHYROIDISM: ICD-10-CM

## 2019-05-14 NOTE — TELEPHONE ENCOUNTER
Was the patient seen in the last year in this department? Yes  10/9/18  Does patient have an active prescription for medications requested? No     Received Request Via: Pharmacy

## 2019-05-16 RX ORDER — LEVOTHYROXINE SODIUM 150 MCG
TABLET ORAL
Qty: 90 TAB | Refills: 0 | Status: SHIPPED | OUTPATIENT
Start: 2019-05-16 | End: 2019-08-14 | Stop reason: SDUPTHER

## 2019-07-24 DIAGNOSIS — G43.009 MIGRAINE WITHOUT AURA AND WITHOUT STATUS MIGRAINOSUS, NOT INTRACTABLE: ICD-10-CM

## 2019-07-25 RX ORDER — SUMATRIPTAN 50 MG/1
TABLET, FILM COATED ORAL
Qty: 9 TAB | Refills: 1 | OUTPATIENT
Start: 2019-07-25

## 2019-07-25 NOTE — TELEPHONE ENCOUNTER
I have not seen the patient in over 2 years.  Please have her make an appointment to be seen.  Silvia Carrasco M.D.

## 2019-07-25 NOTE — TELEPHONE ENCOUNTER
Was the patient seen in the last year in this department? Yes10/9/18    Does patient have an active prescription for medications requested? No     Received Request Via: Pharmacy

## 2019-08-14 ENCOUNTER — OFFICE VISIT (OUTPATIENT)
Dept: MEDICAL GROUP | Facility: LAB | Age: 64
End: 2019-08-14
Payer: COMMERCIAL

## 2019-08-14 ENCOUNTER — HOSPITAL ENCOUNTER (OUTPATIENT)
Dept: LAB | Facility: MEDICAL CENTER | Age: 64
End: 2019-08-14
Attending: FAMILY MEDICINE
Payer: COMMERCIAL

## 2019-08-14 VITALS
SYSTOLIC BLOOD PRESSURE: 118 MMHG | BODY MASS INDEX: 29.22 KG/M2 | HEART RATE: 64 BPM | HEIGHT: 65 IN | OXYGEN SATURATION: 98 % | DIASTOLIC BLOOD PRESSURE: 58 MMHG | TEMPERATURE: 97.5 F | WEIGHT: 175.4 LBS

## 2019-08-14 DIAGNOSIS — E06.3 LYMPHOCYTIC THYROIDITIS: ICD-10-CM

## 2019-08-14 DIAGNOSIS — R19.7 DIARRHEA, UNSPECIFIED TYPE: ICD-10-CM

## 2019-08-14 DIAGNOSIS — K27.9 PEPTIC ULCER: ICD-10-CM

## 2019-08-14 DIAGNOSIS — E78.5 DYSLIPIDEMIA: ICD-10-CM

## 2019-08-14 DIAGNOSIS — Z13.1 SCREENING FOR DIABETES MELLITUS: ICD-10-CM

## 2019-08-14 DIAGNOSIS — E03.9 ACQUIRED HYPOTHYROIDISM: ICD-10-CM

## 2019-08-14 DIAGNOSIS — G43.009 MIGRAINE WITHOUT AURA AND WITHOUT STATUS MIGRAINOSUS, NOT INTRACTABLE: ICD-10-CM

## 2019-08-14 LAB
ALBUMIN SERPL BCP-MCNC: 4 G/DL (ref 3.2–4.9)
ALBUMIN/GLOB SERPL: 1.6 G/DL
ALP SERPL-CCNC: 54 U/L (ref 30–99)
ALT SERPL-CCNC: 11 U/L (ref 2–50)
ANION GAP SERPL CALC-SCNC: 7 MMOL/L (ref 0–11.9)
AST SERPL-CCNC: 12 U/L (ref 12–45)
BASOPHILS # BLD AUTO: 0.8 % (ref 0–1.8)
BASOPHILS # BLD: 0.04 K/UL (ref 0–0.12)
BILIRUB CONJ SERPL-MCNC: 0.1 MG/DL (ref 0.1–0.5)
BILIRUB INDIRECT SERPL-MCNC: 0.4 MG/DL (ref 0–1)
BILIRUB SERPL-MCNC: 0.5 MG/DL (ref 0.1–1.5)
BUN SERPL-MCNC: 19 MG/DL (ref 8–22)
CALCIUM SERPL-MCNC: 9.5 MG/DL (ref 8.5–10.5)
CHLORIDE SERPL-SCNC: 110 MMOL/L (ref 96–112)
CHOLEST SERPL-MCNC: 190 MG/DL (ref 100–199)
CO2 SERPL-SCNC: 27 MMOL/L (ref 20–33)
CREAT SERPL-MCNC: 0.6 MG/DL (ref 0.5–1.4)
EOSINOPHIL # BLD AUTO: 0.11 K/UL (ref 0–0.51)
EOSINOPHIL NFR BLD: 2.1 % (ref 0–6.9)
ERYTHROCYTE [DISTWIDTH] IN BLOOD BY AUTOMATED COUNT: 42.5 FL (ref 35.9–50)
G LAMBLIA+C PARVUM AG STL QL RAPID: NORMAL
GLOBULIN SER CALC-MCNC: 2.5 G/DL (ref 1.9–3.5)
GLUCOSE SERPL-MCNC: 83 MG/DL (ref 65–99)
HCT VFR BLD AUTO: 40.3 % (ref 37–47)
HDLC SERPL-MCNC: 50 MG/DL
HGB BLD-MCNC: 12.7 G/DL (ref 12–16)
IMM GRANULOCYTES # BLD AUTO: 0.01 K/UL (ref 0–0.11)
IMM GRANULOCYTES NFR BLD AUTO: 0.2 % (ref 0–0.9)
LDLC SERPL CALC-MCNC: 127 MG/DL
LYMPHOCYTES # BLD AUTO: 1.29 K/UL (ref 1–4.8)
LYMPHOCYTES NFR BLD: 24.4 % (ref 22–41)
MCH RBC QN AUTO: 28.5 PG (ref 27–33)
MCHC RBC AUTO-ENTMCNC: 31.5 G/DL (ref 33.6–35)
MCV RBC AUTO: 90.6 FL (ref 81.4–97.8)
MONOCYTES # BLD AUTO: 0.44 K/UL (ref 0–0.85)
MONOCYTES NFR BLD AUTO: 8.3 % (ref 0–13.4)
NEUTROPHILS # BLD AUTO: 3.4 K/UL (ref 2–7.15)
NEUTROPHILS NFR BLD: 64.2 % (ref 44–72)
NRBC # BLD AUTO: 0 K/UL
NRBC BLD-RTO: 0 /100 WBC
PLATELET # BLD AUTO: 226 K/UL (ref 164–446)
PMV BLD AUTO: 10.7 FL (ref 9–12.9)
POTASSIUM SERPL-SCNC: 3.8 MMOL/L (ref 3.6–5.5)
PROT SERPL-MCNC: 6.5 G/DL (ref 6–8.2)
RBC # BLD AUTO: 4.45 M/UL (ref 4.2–5.4)
SIGNIFICANT IND 70042: NORMAL
SITE SITE: NORMAL
SODIUM SERPL-SCNC: 144 MMOL/L (ref 135–145)
SOURCE SOURCE: NORMAL
T3 SERPL-MCNC: 88.2 NG/DL (ref 60–181)
T4 FREE SERPL-MCNC: 1.17 NG/DL (ref 0.53–1.43)
TRIGL SERPL-MCNC: 67 MG/DL (ref 0–149)
TSH SERPL DL<=0.005 MIU/L-ACNC: 0.07 UIU/ML (ref 0.38–5.33)
WBC # BLD AUTO: 5.3 K/UL (ref 4.8–10.8)

## 2019-08-14 PROCEDURE — 80061 LIPID PANEL: CPT

## 2019-08-14 PROCEDURE — 36415 COLL VENOUS BLD VENIPUNCTURE: CPT

## 2019-08-14 PROCEDURE — 87045 FECES CULTURE AEROBIC BACT: CPT

## 2019-08-14 PROCEDURE — 87046 STOOL CULTR AEROBIC BACT EA: CPT

## 2019-08-14 PROCEDURE — 87329 GIARDIA AG IA: CPT

## 2019-08-14 PROCEDURE — 99214 OFFICE O/P EST MOD 30 MIN: CPT | Performed by: FAMILY MEDICINE

## 2019-08-14 PROCEDURE — 80053 COMPREHEN METABOLIC PANEL: CPT

## 2019-08-14 PROCEDURE — 84443 ASSAY THYROID STIM HORMONE: CPT

## 2019-08-14 PROCEDURE — 82248 BILIRUBIN DIRECT: CPT

## 2019-08-14 PROCEDURE — 87328 CRYPTOSPORIDIUM AG IA: CPT

## 2019-08-14 PROCEDURE — 87899 AGENT NOS ASSAY W/OPTIC: CPT

## 2019-08-14 PROCEDURE — 84439 ASSAY OF FREE THYROXINE: CPT

## 2019-08-14 PROCEDURE — 85025 COMPLETE CBC W/AUTO DIFF WBC: CPT

## 2019-08-14 PROCEDURE — 84480 ASSAY TRIIODOTHYRONINE (T3): CPT

## 2019-08-14 RX ORDER — PHENOL 1.4 %
AEROSOL, SPRAY (ML) MUCOUS MEMBRANE
COMMUNITY
End: 2021-07-13

## 2019-08-14 RX ORDER — SUMATRIPTAN 50 MG/1
50 TABLET, FILM COATED ORAL
Qty: 10 TAB | Refills: 3 | Status: SHIPPED | OUTPATIENT
Start: 2019-08-14 | End: 2020-08-27 | Stop reason: SDUPTHER

## 2019-08-14 RX ORDER — LEVOTHYROXINE SODIUM 150 MCG
TABLET ORAL
Qty: 30 TAB | Refills: 0 | Status: SHIPPED | OUTPATIENT
Start: 2019-08-14 | End: 2019-09-09 | Stop reason: SDUPTHER

## 2019-08-14 ASSESSMENT — PATIENT HEALTH QUESTIONNAIRE - PHQ9: CLINICAL INTERPRETATION OF PHQ2 SCORE: 0

## 2019-08-14 NOTE — ASSESSMENT & PLAN NOTE
Started 7/29/19.  Having 7-8 stools a day,  Some nausea initially but no vomiting.  She stopped Omeprazole 7/20/19 on her own and restarted it 8/4/19.  She traveled to [place] 6 weeks ago and went to [place] when this started. She had black stools 8/4/19 for a few times and that resolved.  She had not taken any pepto.

## 2019-08-15 LAB
E COLI SXT1+2 STL IA: NORMAL
SIGNIFICANT IND 70042: NORMAL
SITE SITE: NORMAL
SOURCE SOURCE: NORMAL

## 2019-08-16 ENCOUNTER — PATIENT MESSAGE (OUTPATIENT)
Dept: MEDICAL GROUP | Facility: LAB | Age: 64
End: 2019-08-16

## 2019-08-17 LAB
BACTERIA STL CULT: NORMAL
E COLI SXT1+2 STL IA: NORMAL
SIGNIFICANT IND 70042: NORMAL
SITE SITE: NORMAL
SOURCE SOURCE: NORMAL

## 2019-08-19 NOTE — ASSESSMENT & PLAN NOTE
Patient has been stable.  She just needs a refill of her sumatriptan for occasional migraines.  She denies any neurologic deficits.

## 2019-08-19 NOTE — ASSESSMENT & PLAN NOTE
Patient has dyslipidemia but she wishes to treat this with dietary modification and increased exercise.

## 2019-08-19 NOTE — ASSESSMENT & PLAN NOTE
Stable. Currently taking levothyroxine 150 Mcg daily except for on Sundays when she takes 225 mcg as prescribed.  Patient is due for labs.  Denies palpitations, skin changes, temperature intolerance, or changes in bowel habits

## 2019-08-19 NOTE — PROGRESS NOTES
Subjective:     Chief Complaint   Patient presents with   • Medication Refill     synthroid       Lynne Corinne Cibulsky is a 64 y.o. female here today for evaluation and management of:    Diarrhea  Started 7/29/19.  Having 7-8 stools a day,  Some nausea initially but no vomiting.  She stopped Omeprazole 7/20/19 on her own and restarted it 8/4/19.  She traveled to [place] 6 weeks ago and went to [place] when this started. She had black stools 8/4/19 for a few times and that resolved.  She had not taken any pepto.      Dyslipidemia  Patient has dyslipidemia but she wishes to treat this with dietary modification and increased exercise.    Hypothyroidism  Stable. Currently taking levothyroxine 150 Mcg daily except for on Sundays when she takes 225 mcg as prescribed.  Patient is due for labs.  Denies palpitations, skin changes, temperature intolerance, or changes in bowel habits        Migraine without aura and without status migrainosus, not intractable  Patient has been stable.  She just needs a refill of her sumatriptan for occasional migraines.  She denies any neurologic deficits.    Peptic ulcer  Patient is currently on omeprazole 40 mg daily.  She denies any black or bloody stools.       Allergies   Allergen Reactions   • Gluten      GI upset   • Morphine Vomiting   • Pcn [Penicillins] Rash     Tolerates Ancef.   • Percocet [Oxycodone-Acetaminophen] Unspecified     migraine  migraine       Current medicines (including changes today)  Current Outpatient Medications   Medication Sig Dispense Refill   • Melatonin 10 MG Tab Take  by mouth.     • SYNTHROID 150 MCG Tab TAKE ONE TABLET BY MOUTH EVERY MORNING ON AN EMPTY STOMACH 30 Tab 0   • SUMAtriptan (IMITREX) 50 MG Tab Take 1 Tab by mouth Once PRN for Migraine for up to 1 dose. 10 Tab 3   • acyclovir (ZOVIRAX) 400 MG tablet TAKE ONE TABLET BY MOUTH THREE TIMES A DAY 30 Tab 3   • omeprazole (PRILOSEC) 40 MG delayed-release capsule Take 1 Cap by mouth every day. 30 Cap  2   • albuterol 108 (90 Base) MCG/ACT Aero Soln inhalation aerosol Inhale 2 Puffs by mouth every four hours as needed for Shortness of Breath (cough and wheezing). 1 Inhaler 0   • azithromycin (ZITHROMAX) 500 MG tablet One po 30 minutes prior to dental procedure 3 Tab 1   • Calcium Citrate-Vitamin D (CALCIUM + D PO) Take  by mouth.     • vitamin D (CHOLECALCIFEROL) 1000 UNIT Tab Take 2,000 Units by mouth every day.     • Ginkgo Biloba 40 MG Tab Take  by mouth.       No current facility-administered medications for this visit.        She  has a past medical history of Anesthesia, Arthritis, ASTHMA, Asthma (7/11/2014), Breath shortness, Chest pain (7/10/2014), Endometriosis of vagina (9/19/2016), H/O migraine (7/11/2014), HSV infection (9/19/2016), Hyperlipidemia (9/19/2016), Hyperthyroidism, Knee osteoarthritis (9/12/2014), Lymphocytic thyroiditis (9/19/2016), Migraine without aura and without status migrainosus, not intractable (9/19/2016), Psychiatric problem, Renal colic (9/19/2016), Renal disorder, Rotator cuff tear (1/30/2014), and Vitamin D deficiency (9/19/2016). She also has no past medical history of Arrhythmia, Bronchitis, Cancer (HCC), Cataract, Cold, Congestive heart failure (HCC), Coughing blood, Dental disorder, Glaucoma, Heart burn, Heart valve disease, Hepatitis A, Hepatitis B, Hepatitis C, High cholesterol, Hypertension, Indigestion, Myocardial infarct (HCC), Other and unspecified angina pectoris, Other emphysema (HCC), Pacemaker, Personal history of venous thrombosis and embolism, Pneumonia, Rheumatic fever, Seizure (HCC), Sleep apnea, Snoring, Tuberculosis, Type II or unspecified type diabetes mellitus without mention of complication, not stated as uncontrolled, Unspecified hemorrhagic conditions, or Unspecified urinary incontinence.    Patient Active Problem List    Diagnosis Date Noted   • Diarrhea 08/14/2019   • Peptic ulcer 08/14/2019   • Epigastric pain 10/09/2018   • Obesity (BMI 30-39.9)  "05/09/2017   • Actinic keratosis 09/19/2016   • Endometriosis of vagina 09/19/2016   • Lymphocytic thyroiditis 09/19/2016   • Renal colic 09/19/2016   • Hypothyroidism 09/19/2016   • Vitamin D deficiency 09/19/2016   • Dyslipidemia 09/19/2016   • Changing pigmented skin lesion 09/19/2016   • Migraine without aura and without status migrainosus, not intractable 09/19/2016   • HSV infection 09/19/2016   • Knee osteoarthritis 09/12/2014   • Hypokalemia 07/11/2014   • Asthma 07/11/2014   • Hypothyroid 07/11/2014   • Chest pain 07/10/2014   • S/P rotator cuff repair 02/27/2014   • Shoulder impingement 01/16/2014       ROS   No fever or chills.  No nausea or vomiting.  No chest pain or palpitations.  No cough or SOB.  No pain with urination or hematuria.  No black or bloody stools.       Objective:     /58 (BP Location: Left arm, Patient Position: Sitting)   Pulse 64   Temp 36.4 °C (97.5 °F) (Temporal)   Ht 1.648 m (5' 4.9\")   Wt 79.6 kg (175 lb 6.4 oz)   SpO2 98%  Body mass index is 29.28 kg/m².   Physical Exam:  Well developed, well nourished.  Alert, oriented in no acute distress.  Eye contact is good, speech goal directed, affect calm  Eyes: conjunctiva non-injected, sclera non-icteric.  Neck Supple.  No adenopathy or masses in the neck or supraclavicular regions. No thyromegaly  Lungs: clear to auscultation bilaterally with good excursion. No wheezes or rhonchi  CV: regular rate and rhythm. No murmur  Abdomen: soft, nontender, no masses or organomegaly.  No rebound or guarding  Ext: no edema, color normal, vascularity normal, temperature normal          Assessment and Plan:   The following treatment plan was discussed    1. Lymphocytic thyroiditis  Check labs.  Adjust dose as needed.  Await results  - SYNTHROID 150 MCG Tab; TAKE ONE TABLET BY MOUTH EVERY MORNING ON AN EMPTY STOMACH  Dispense: 30 Tab; Refill: 0  - TSH; Future  - FREE THYROXINE; Future  - TRIIDOTHYRONINE; Future    2. Diarrhea, unspecified " type  Check labs to rule out hepatitis like illness.  Stool studies ordered.  Discussed that this may be viral  - HEPATIC FUNCTION PANEL; Future  - CBC WITH DIFFERENTIAL; Future  - CRYPTO/GIARDIA RAPID ASSAY; Future  - CULTURE STOOL; Future  - SALMONELLA/SHIGELLA SCREEN  - E COLI SHIGA TOXIN EIA    3. Acquired hypothyroidism  Check labs.  Adjust Synthroid dose as needed  - SYNTHROID 150 MCG Tab; TAKE ONE TABLET BY MOUTH EVERY MORNING ON AN EMPTY STOMACH  Dispense: 30 Tab; Refill: 0    4. Migraine without aura and without status migrainosus, not intractable  Refill sumatriptan for as needed use  - SUMAtriptan (IMITREX) 50 MG Tab; Take 1 Tab by mouth Once PRN for Migraine for up to 1 dose.  Dispense: 10 Tab; Refill: 3    5. Peptic ulcer  Continue omeprazole  - HEPATIC FUNCTION PANEL; Future  - CBC WITH DIFFERENTIAL; Future    6. Screening for diabetes mellitus  Screening labs ordered.  Await results for counseling.    - Comp Metabolic Panel; Future    7. Dyslipidemia  Check lipid panel.  Discussed how statin use can decrease risk of cardiovascular disease.  - Lipid Profile; Future    Any change or worsening of signs or symptoms, patient encouraged to follow-up or report to the emergency room for further evaluation. Patient understands and agrees.    Followup: Return in about 1 year (around 8/14/2020).

## 2019-08-22 ENCOUNTER — TELEPHONE (OUTPATIENT)
Dept: MEDICAL GROUP | Facility: LAB | Age: 64
End: 2019-08-22

## 2019-08-23 NOTE — TELEPHONE ENCOUNTER
Phone Number Called: [phone]    Call outcome: I left a voicemail for Ezequiels Miguelina    Message: I left a vm for the pharmacy regarding the max dose daily is 2 for the sumatriptan.

## 2019-09-09 DIAGNOSIS — E03.9 ACQUIRED HYPOTHYROIDISM: ICD-10-CM

## 2019-09-09 DIAGNOSIS — E06.3 LYMPHOCYTIC THYROIDITIS: ICD-10-CM

## 2019-09-09 RX ORDER — LEVOTHYROXINE SODIUM 150 MCG
TABLET ORAL
Qty: 90 TAB | Refills: 1 | Status: SHIPPED | OUTPATIENT
Start: 2019-09-09 | End: 2020-03-06

## 2019-09-09 NOTE — TELEPHONE ENCOUNTER
Was the patient seen in the last year in this department? Yes  8/14/19  Does patient have an active prescription for medications requested? No     Received Request Via: Pharmacy

## 2019-09-11 ENCOUNTER — HOSPITAL ENCOUNTER (OUTPATIENT)
Facility: MEDICAL CENTER | Age: 64
End: 2019-09-11
Attending: NURSE PRACTITIONER
Payer: COMMERCIAL

## 2019-09-18 ENCOUNTER — HOSPITAL ENCOUNTER (OUTPATIENT)
Facility: MEDICAL CENTER | Age: 64
End: 2019-09-18
Attending: NURSE PRACTITIONER
Payer: COMMERCIAL

## 2019-09-18 ENCOUNTER — HOSPITAL ENCOUNTER (OUTPATIENT)
Dept: LAB | Facility: MEDICAL CENTER | Age: 64
End: 2019-09-18
Attending: NURSE PRACTITIONER
Payer: COMMERCIAL

## 2019-09-18 LAB
APPEARANCE UR: CLEAR
BILIRUB UR QL STRIP.AUTO: NEGATIVE
C DIFF DNA SPEC QL NAA+PROBE: NEGATIVE
C DIFF TOX GENS STL QL NAA+PROBE: NEGATIVE
COLOR UR: YELLOW
GLUCOSE UR STRIP.AUTO-MCNC: NEGATIVE MG/DL
KETONES UR STRIP.AUTO-MCNC: NEGATIVE MG/DL
LEUKOCYTE ESTERASE UR QL STRIP.AUTO: NEGATIVE
MICRO URNS: NORMAL
NITRITE UR QL STRIP.AUTO: NEGATIVE
PH UR STRIP.AUTO: 6.5 [PH] (ref 5–8)
PROT UR QL STRIP: NEGATIVE MG/DL
RBC UR QL AUTO: NEGATIVE
SP GR UR STRIP.AUTO: 1.01
UROBILINOGEN UR STRIP.AUTO-MCNC: 0.2 MG/DL

## 2019-09-18 PROCEDURE — 87493 C DIFF AMPLIFIED PROBE: CPT

## 2019-09-18 PROCEDURE — 82274 ASSAY TEST FOR BLOOD FECAL: CPT

## 2019-09-18 PROCEDURE — 81003 URINALYSIS AUTO W/O SCOPE: CPT

## 2019-09-19 LAB
AMBIGUOUS DTTM AMBI4: NORMAL
HEMOCCULT STL QL IA: NEGATIVE

## 2019-10-02 ENCOUNTER — HOSPITAL ENCOUNTER (OUTPATIENT)
Dept: RADIOLOGY | Facility: MEDICAL CENTER | Age: 64
End: 2019-10-02
Attending: NURSE PRACTITIONER
Payer: COMMERCIAL

## 2019-10-02 DIAGNOSIS — R10.9 FLANK PAIN: ICD-10-CM

## 2019-10-02 PROCEDURE — 700117 HCHG RX CONTRAST REV CODE 255: Performed by: NURSE PRACTITIONER

## 2019-10-02 PROCEDURE — 74177 CT ABD & PELVIS W/CONTRAST: CPT

## 2019-10-02 RX ADMIN — IOHEXOL 25 ML: 240 INJECTION, SOLUTION INTRATHECAL; INTRAVASCULAR; INTRAVENOUS; ORAL at 09:00

## 2019-10-02 RX ADMIN — IOHEXOL 100 ML: 350 INJECTION, SOLUTION INTRAVENOUS at 09:00

## 2019-10-22 DIAGNOSIS — B00.9 HSV INFECTION: ICD-10-CM

## 2019-10-22 RX ORDER — ACYCLOVIR 400 MG/1
TABLET ORAL
Qty: 30 TAB | Refills: 2 | Status: SHIPPED | OUTPATIENT
Start: 2019-10-22 | End: 2020-05-20

## 2019-10-22 NOTE — TELEPHONE ENCOUNTER
Was the patient seen in the last year in this department? Yes8/14/19    Does patient have an active prescription for medications requested? No     Received Request Via: Pharmacy

## 2019-11-06 ENCOUNTER — HOSPITAL ENCOUNTER (OUTPATIENT)
Dept: RADIOLOGY | Facility: MEDICAL CENTER | Age: 64
End: 2019-11-06
Attending: NEUROLOGICAL SURGERY
Payer: COMMERCIAL

## 2019-11-06 DIAGNOSIS — M54.50 LOW BACK PAIN, UNSPECIFIED BACK PAIN LATERALITY, UNSPECIFIED CHRONICITY, UNSPECIFIED WHETHER SCIATICA PRESENT: ICD-10-CM

## 2019-11-06 PROCEDURE — 72110 X-RAY EXAM L-2 SPINE 4/>VWS: CPT

## 2019-11-18 ENCOUNTER — HOSPITAL ENCOUNTER (OUTPATIENT)
Dept: RADIOLOGY | Facility: MEDICAL CENTER | Age: 64
End: 2019-11-18
Attending: PHYSICIAN ASSISTANT
Payer: COMMERCIAL

## 2019-11-18 DIAGNOSIS — M25.551 RIGHT HIP PAIN: ICD-10-CM

## 2019-11-18 PROCEDURE — 73521 X-RAY EXAM HIPS BI 2 VIEWS: CPT

## 2020-03-05 DIAGNOSIS — E06.3 LYMPHOCYTIC THYROIDITIS: ICD-10-CM

## 2020-03-05 DIAGNOSIS — E03.9 ACQUIRED HYPOTHYROIDISM: ICD-10-CM

## 2020-03-05 NOTE — TELEPHONE ENCOUNTER
Received request via: Pharmacy    Was the patient seen in the last year in this department? Yes  8/14/19  Does the patient have an active prescription (recently filled or refills available) for medication(s) requested? No

## 2020-03-06 RX ORDER — LEVOTHYROXINE SODIUM 150 MCG
TABLET ORAL
Qty: 90 TAB | Refills: 0 | Status: SHIPPED
Start: 2020-03-06 | End: 2020-05-19

## 2020-03-25 ENCOUNTER — APPOINTMENT (OUTPATIENT)
Dept: RADIOLOGY | Facility: MEDICAL CENTER | Age: 65
End: 2020-03-25
Attending: PHYSICIAN ASSISTANT
Payer: COMMERCIAL

## 2020-04-23 ENCOUNTER — TELEPHONE (OUTPATIENT)
Dept: MEDICAL GROUP | Facility: LAB | Age: 65
End: 2020-04-23

## 2020-04-23 DIAGNOSIS — Z12.31 ENCOUNTER FOR SCREENING MAMMOGRAM FOR MALIGNANT NEOPLASM OF BREAST: ICD-10-CM

## 2020-04-23 NOTE — TELEPHONE ENCOUNTER
1. Caller Name: Lynne Corinne Cibulsky                          Call Back Number: [phone] (home)         How would the patient prefer to be contacted with a response: Phone call OK to leave a detailed message    Needing updated order.  MA-SCREENING MAMMO BILAT W/TOMOSYNTHESIS W/CAD   Fax: [phone]

## 2020-04-23 NOTE — TELEPHONE ENCOUNTER
1. Caller Name: Lynne Corinne Cibulsky                          Call Back Number: [phone] (home)         How would the patient prefer to be contacted with a response: Phone call OK to leave a detailed message      Needing updated order.  MA-SCREENING MAMMO BILAT W/TOMOSYNTHESIS W/CAD   Fax: [phone]

## 2020-05-06 ENCOUNTER — APPOINTMENT (OUTPATIENT)
Dept: RADIOLOGY | Facility: MEDICAL CENTER | Age: 65
End: 2020-05-06
Attending: PHYSICIAN ASSISTANT
Payer: COMMERCIAL

## 2020-05-06 DIAGNOSIS — M43.16 SPONDYLOLISTHESIS OF LUMBAR REGION: ICD-10-CM

## 2020-05-06 PROCEDURE — 72148 MRI LUMBAR SPINE W/O DYE: CPT

## 2020-05-12 ENCOUNTER — TELEPHONE (OUTPATIENT)
Dept: MEDICAL GROUP | Facility: LAB | Age: 65
End: 2020-05-12

## 2020-05-12 NOTE — TELEPHONE ENCOUNTER
"Mónica called stating that her employer needs a note that Mónica CAN work.  Mónica states that since she has a history of Asthma, her employer wants a letter from us stating that she can work.  Her employer knows that she has a history of asthma and considers her \"vulnerable\" and will need a note to allow her to work. She is a  at a [employer], and works 30 hours per week. Please advise.  "

## 2020-05-14 ENCOUNTER — OFFICE VISIT (OUTPATIENT)
Dept: MEDICAL GROUP | Facility: LAB | Age: 65
End: 2020-05-14
Payer: COMMERCIAL

## 2020-05-14 ENCOUNTER — HOSPITAL ENCOUNTER (OUTPATIENT)
Dept: LAB | Facility: MEDICAL CENTER | Age: 65
End: 2020-05-14
Attending: FAMILY MEDICINE
Payer: COMMERCIAL

## 2020-05-14 VITALS
OXYGEN SATURATION: 97 % | TEMPERATURE: 97.2 F | BODY MASS INDEX: 33.82 KG/M2 | HEART RATE: 70 BPM | HEIGHT: 65 IN | RESPIRATION RATE: 16 BRPM | SYSTOLIC BLOOD PRESSURE: 106 MMHG | DIASTOLIC BLOOD PRESSURE: 68 MMHG | WEIGHT: 203 LBS

## 2020-05-14 DIAGNOSIS — E78.5 DYSLIPIDEMIA: ICD-10-CM

## 2020-05-14 DIAGNOSIS — E03.9 ACQUIRED HYPOTHYROIDISM: ICD-10-CM

## 2020-05-14 DIAGNOSIS — Z01.84 IMMUNITY STATUS TESTING: ICD-10-CM

## 2020-05-14 DIAGNOSIS — Z13.1 SCREENING FOR DIABETES MELLITUS: ICD-10-CM

## 2020-05-14 DIAGNOSIS — E06.3 LYMPHOCYTIC THYROIDITIS: ICD-10-CM

## 2020-05-14 DIAGNOSIS — R92.30 DENSE BREAST TISSUE ON MAMMOGRAM: ICD-10-CM

## 2020-05-14 DIAGNOSIS — J45.20 MILD INTERMITTENT ASTHMA WITHOUT COMPLICATION: ICD-10-CM

## 2020-05-14 PROBLEM — R19.7 DIARRHEA: Status: RESOLVED | Noted: 2019-08-14 | Resolved: 2020-05-14

## 2020-05-14 PROBLEM — M43.16 SPONDYLOLISTHESIS OF LUMBAR REGION: Status: ACTIVE | Noted: 2020-01-07

## 2020-05-14 PROCEDURE — 99214 OFFICE O/P EST MOD 30 MIN: CPT | Performed by: FAMILY MEDICINE

## 2020-05-14 PROCEDURE — 80061 LIPID PANEL: CPT

## 2020-05-14 PROCEDURE — 86769 SARS-COV-2 COVID-19 ANTIBODY: CPT

## 2020-05-14 PROCEDURE — 84443 ASSAY THYROID STIM HORMONE: CPT

## 2020-05-14 PROCEDURE — 84439 ASSAY OF FREE THYROXINE: CPT

## 2020-05-14 PROCEDURE — 36415 COLL VENOUS BLD VENIPUNCTURE: CPT

## 2020-05-14 PROCEDURE — 80053 COMPREHEN METABOLIC PANEL: CPT

## 2020-05-14 ASSESSMENT — FIBROSIS 4 INDEX: FIB4 SCORE: 1.02

## 2020-05-14 ASSESSMENT — PATIENT HEALTH QUESTIONNAIRE - PHQ9: CLINICAL INTERPRETATION OF PHQ2 SCORE: 0

## 2020-05-15 LAB
ALBUMIN SERPL BCP-MCNC: 4.2 G/DL (ref 3.2–4.9)
ALBUMIN/GLOB SERPL: 1.9 G/DL
ALP SERPL-CCNC: 68 U/L (ref 30–99)
ALT SERPL-CCNC: 25 U/L (ref 2–50)
ANION GAP SERPL CALC-SCNC: 13 MMOL/L (ref 7–16)
AST SERPL-CCNC: 23 U/L (ref 12–45)
BILIRUB SERPL-MCNC: 0.3 MG/DL (ref 0.1–1.5)
BUN SERPL-MCNC: 14 MG/DL (ref 8–22)
CALCIUM SERPL-MCNC: 9.6 MG/DL (ref 8.5–10.5)
CHLORIDE SERPL-SCNC: 104 MMOL/L (ref 96–112)
CHOLEST SERPL-MCNC: 183 MG/DL (ref 100–199)
CO2 SERPL-SCNC: 27 MMOL/L (ref 20–33)
CREAT SERPL-MCNC: 0.65 MG/DL (ref 0.5–1.4)
FASTING STATUS PATIENT QL REPORTED: NORMAL
GLOBULIN SER CALC-MCNC: 2.2 G/DL (ref 1.9–3.5)
GLUCOSE SERPL-MCNC: 82 MG/DL (ref 65–99)
HDLC SERPL-MCNC: 55 MG/DL
LDLC SERPL CALC-MCNC: 103 MG/DL
POTASSIUM SERPL-SCNC: 4.1 MMOL/L (ref 3.6–5.5)
PROT SERPL-MCNC: 6.4 G/DL (ref 6–8.2)
SODIUM SERPL-SCNC: 144 MMOL/L (ref 135–145)
T4 FREE SERPL-MCNC: 1.83 NG/DL (ref 0.93–1.7)
TRIGL SERPL-MCNC: 123 MG/DL (ref 0–149)
TSH SERPL DL<=0.005 MIU/L-ACNC: 1.1 UIU/ML (ref 0.38–5.33)

## 2020-05-16 LAB — TEST NAME 95000: NORMAL

## 2020-05-19 DIAGNOSIS — E03.9 ACQUIRED HYPOTHYROIDISM: ICD-10-CM

## 2020-05-19 RX ORDER — LEVOTHYROXINE SODIUM 137 MCG
137 TABLET ORAL
Qty: 90 TAB | Refills: 3 | Status: SHIPPED | OUTPATIENT
Start: 2020-05-19 | End: 2021-04-15 | Stop reason: SDUPTHER

## 2020-05-20 DIAGNOSIS — B00.9 HSV INFECTION: ICD-10-CM

## 2020-05-20 NOTE — TELEPHONE ENCOUNTER
Received request via: Pharmacy    Was the patient seen in the last year in this department? Yes 5/14/2020    Does the patient have an active prescription (recently filled or refills available) for medication(s) requested? No

## 2020-05-21 RX ORDER — ACYCLOVIR 400 MG/1
TABLET ORAL
Qty: 30 TAB | Refills: 3 | Status: SHIPPED | OUTPATIENT
Start: 2020-05-21 | End: 2022-02-23 | Stop reason: SDUPTHER

## 2020-05-21 NOTE — ASSESSMENT & PLAN NOTE
Patient has history of dyslipidemia.  She is reluctant to consider statins.  The 10-year ASCVD risk score (Joseja WISDOM Jr., et al., 2013) is: 3.3%

## 2020-05-21 NOTE — PROGRESS NOTES
Subjective:     Chief Complaint   Patient presents with   • Letter for School/Work   • Medication Refill       Lynne Corinne Cibulsky is a 65 y.o. female here today for evaluation and management of:    Mild intermittent asthma without complication  Patient only uses the albuterol about once a month unless she gets an URI or in high pollen times    Dense breast tissue on mammogram  Patient had dense tissue on mammogram.  She would like to get an ultrasound    Dyslipidemia  Patient has history of dyslipidemia.  She is reluctant to consider statins.  The 10-year ASCVD risk score (Jose DC Jr., et al., 2013) is: 3.3%      Lymphocytic thyroiditis  Stable. Currently taking Synthroid 137mcg daily as prescribed.  Prefers brand name only  Denies palpitations, skin changes, temperature intolerance, or changes in bowel habits           Allergies   Allergen Reactions   • Gluten      GI upset   • Morphine Vomiting   • Pcn [Penicillins] Rash     Tolerates Ancef.   • Percocet [Oxycodone-Acetaminophen] Unspecified     migraine  migraine       Current medicines (including changes today)  Current Outpatient Medications   Medication Sig Dispense Refill   • acyclovir (ZOVIRAX) 400 MG tablet TAKE ONE TABLET BY MOUTH THREE TIMES A DAY 30 Tab 2   • Melatonin 10 MG Tab Take  by mouth.     • SUMAtriptan (IMITREX) 50 MG Tab Take 1 Tab by mouth Once PRN for Migraine for up to 1 dose. 10 Tab 3   • omeprazole (PRILOSEC) 40 MG delayed-release capsule Take 1 Cap by mouth every day. 30 Cap 2   • albuterol 108 (90 Base) MCG/ACT Aero Soln inhalation aerosol Inhale 2 Puffs by mouth every four hours as needed for Shortness of Breath (cough and wheezing). 1 Inhaler 0   • SYNTHROID 137 MCG Tab Take 1 Tab by mouth Every morning on an empty stomach. 90 Tab 3     No current facility-administered medications for this visit.        She  has a past medical history of Anesthesia, Arthritis, ASTHMA, Asthma (7/11/2014), Breath shortness, Chest pain (7/10/2014),  Endometriosis of vagina (9/19/2016), H/O migraine (7/11/2014), HSV infection (9/19/2016), Hyperlipidemia (9/19/2016), Hyperthyroidism, Knee osteoarthritis (9/12/2014), Lymphocytic thyroiditis (9/19/2016), Migraine without aura and without status migrainosus, not intractable (9/19/2016), Psychiatric problem, Renal colic (9/19/2016), Renal disorder, Rotator cuff tear (1/30/2014), and Vitamin D deficiency (9/19/2016). She also has no past medical history of Arrhythmia, Bronchitis, Cancer (HCC), Cataract, Cold, Congestive heart failure (HCC), Coughing blood, Dental disorder, Glaucoma, Heart burn, Heart valve disease, Hepatitis A, Hepatitis B, Hepatitis C, High cholesterol, Hypertension, Indigestion, Myocardial infarct (HCC), Other and unspecified angina pectoris, Other emphysema (HCC), Pacemaker, Personal history of venous thrombosis and embolism, Pneumonia, Rheumatic fever, Seizure (HCC), Sleep apnea, Snoring, Tuberculosis, Type II or unspecified type diabetes mellitus without mention of complication, not stated as uncontrolled, Unspecified hemorrhagic conditions, or Unspecified urinary incontinence.    Patient Active Problem List    Diagnosis Date Noted   • Mild intermittent asthma without complication 05/14/2020   • Dense breast tissue on mammogram 05/14/2020   • Spondylolisthesis of lumbar region 01/07/2020   • Peptic ulcer 08/14/2019   • Epigastric pain 10/09/2018   • Obesity (BMI 30-39.9) 05/09/2017   • Endometriosis of vagina 09/19/2016   • Lymphocytic thyroiditis 09/19/2016   • Renal colic 09/19/2016   • Vitamin D deficiency 09/19/2016   • Dyslipidemia 09/19/2016   • Migraine without aura and without status migrainosus, not intractable 09/19/2016   • HSV infection 09/19/2016   • Knee osteoarthritis 09/12/2014   • Hypokalemia 07/11/2014   • S/P rotator cuff repair 02/27/2014   • Shoulder impingement 01/16/2014       ROS   No fever or chills.  No nausea or vomiting.  No chest pain or palpitations.  No cough or  "SOB.  No pain with urination or hematuria.  No black or bloody stools.       Objective:     /68 (BP Location: Right arm, Patient Position: Sitting, BP Cuff Size: Adult)   Pulse 70   Temp 36.2 °C (97.2 °F) (Temporal)   Resp 16   Ht 1.648 m (5' 4.88\")   Wt 92.1 kg (203 lb)   SpO2 97%  Body mass index is 33.9 kg/m².   Physical Exam:  Well developed, well nourished.  Alert, oriented in no acute distress.  Eye contact is good, speech goal directed, affect calm  Eyes: conjunctiva non-injected, sclera non-icteric.  Neck Supple.  No adenopathy or masses in the neck or supraclavicular regions. No thyromegaly  Lungs: clear to auscultation bilaterally with good excursion. No wheezes or rhonchi  CV: regular rate and rhythm. No murmur      Assessment and Plan:   The following treatment plan was discussed    1. Mild intermittent asthma without complication  Refill albuterol for as needed use.  Needs a note to return to being a  at her [employer].  They have been closed because they had multiple COVID cases.  We discussed using a mask and doing as much remotely as possible    2. Acquired hypothyroidism  Check thyroid labs and adjust Synthroid dose as needed  - TSH; Future  - FREE THYROXINE; Future    3. Screening for diabetes mellitus  Screening labs ordered.  Await results for counseling.    - Comp Metabolic Panel; Future    4. Dyslipidemia  Check lipid profile.  Low-fat diet discussed  - Lipid Profile; Future    5. Immunity status testing  COVID antibody testing done per patient's request    6. Dense breast tissue on mammogram  Ultrasound of the breast as well as bilateral mammogram  - MA-SCREENING MAMMO BILAT W/CAD; Future  - US-SCREENING WHOLE BREAST BILATERAL (3D SCREENING); Future    7. Lymphocytic thyroiditis  See #2    Any change or worsening of signs or symptoms, patient encouraged to follow-up or report to the emergency room for further evaluation. Patient understands and agrees.    Followup: Return " in about 1 year (around 5/14/2021).

## 2020-05-21 NOTE — ASSESSMENT & PLAN NOTE
Stable. Currently taking Synthroid 137mcg daily as prescribed.  Prefers brand name only  Denies palpitations, skin changes, temperature intolerance, or changes in bowel habits

## 2020-06-01 DIAGNOSIS — E03.9 ACQUIRED HYPOTHYROIDISM: ICD-10-CM

## 2020-06-01 DIAGNOSIS — E06.3 LYMPHOCYTIC THYROIDITIS: ICD-10-CM

## 2020-06-01 NOTE — TELEPHONE ENCOUNTER
Received request via: Pharmacy    Was the patient seen in the last year in this department? Yes    Does the patient have an active prescription (recently filled or refills available) for medication(s) requested?  Yes. Refill request has been refused in Epic. Contacted pharmacy and called in most recent prescription. Last filled: 05/19/2020.     SYNTHROID 150 MCG TABLET

## 2020-06-02 RX ORDER — ALBUTEROL SULFATE 90 UG/1
2 AEROSOL, METERED RESPIRATORY (INHALATION) EVERY 4 HOURS PRN
Qty: 1 INHALER | Refills: 3 | Status: SHIPPED | OUTPATIENT
Start: 2020-06-02 | End: 2021-04-21 | Stop reason: SDUPTHER

## 2020-06-02 RX ORDER — LEVOTHYROXINE SODIUM 150 MCG
TABLET ORAL
Qty: 90 TAB | Refills: 2 | Status: SHIPPED
Start: 2020-06-02 | End: 2020-10-13

## 2020-06-02 NOTE — TELEPHONE ENCOUNTER
Patient is taking synthroid 137mcg now.  I will call pharmacy to cancel the 150mcg.  Mónica also needs a refill of her inhaler.

## 2020-06-17 ENCOUNTER — HOSPITAL ENCOUNTER (OUTPATIENT)
Dept: RADIOLOGY | Facility: MEDICAL CENTER | Age: 65
End: 2020-06-17
Attending: FAMILY MEDICINE
Payer: COMMERCIAL

## 2020-06-17 DIAGNOSIS — R92.30 DENSE BREAST TISSUE ON MAMMOGRAM: ICD-10-CM

## 2020-06-17 DIAGNOSIS — Z12.31 ENCOUNTER FOR SCREENING MAMMOGRAM FOR MALIGNANT NEOPLASM OF BREAST: ICD-10-CM

## 2020-06-17 PROCEDURE — 77067 SCR MAMMO BI INCL CAD: CPT

## 2020-06-17 PROCEDURE — 76641 ULTRASOUND BREAST COMPLETE: CPT

## 2020-06-18 DIAGNOSIS — R92.8 ABNORMAL MAMMOGRAM OF LEFT BREAST: ICD-10-CM

## 2020-06-22 RX ORDER — AZITHROMYCIN 500 MG/1
TABLET, FILM COATED ORAL
Qty: 3 TAB | Refills: 0 | Status: SHIPPED
Start: 2020-06-22 | End: 2020-08-27

## 2020-06-25 ENCOUNTER — HOSPITAL ENCOUNTER (OUTPATIENT)
Dept: RADIOLOGY | Facility: MEDICAL CENTER | Age: 65
End: 2020-06-25
Attending: NURSE PRACTITIONER
Payer: COMMERCIAL

## 2020-06-25 DIAGNOSIS — R92.8 ABNORMAL MAMMOGRAM OF LEFT BREAST: ICD-10-CM

## 2020-06-25 PROCEDURE — 76642 ULTRASOUND BREAST LIMITED: CPT | Mod: LT

## 2020-06-25 PROCEDURE — 77065 DX MAMMO INCL CAD UNI: CPT | Mod: LT

## 2020-08-27 ENCOUNTER — OFFICE VISIT (OUTPATIENT)
Dept: MEDICAL GROUP | Facility: LAB | Age: 65
End: 2020-08-27
Payer: COMMERCIAL

## 2020-08-27 VITALS
OXYGEN SATURATION: 97 % | HEIGHT: 65 IN | RESPIRATION RATE: 16 BRPM | SYSTOLIC BLOOD PRESSURE: 132 MMHG | TEMPERATURE: 98.6 F | DIASTOLIC BLOOD PRESSURE: 80 MMHG | HEART RATE: 98 BPM | WEIGHT: 202 LBS | BODY MASS INDEX: 33.66 KG/M2

## 2020-08-27 DIAGNOSIS — R41.3 MEMORY CHANGES: ICD-10-CM

## 2020-08-27 DIAGNOSIS — G43.009 MIGRAINE WITHOUT AURA AND WITHOUT STATUS MIGRAINOSUS, NOT INTRACTABLE: ICD-10-CM

## 2020-08-27 DIAGNOSIS — G44.52 NEW DAILY PERSISTENT HEADACHE: ICD-10-CM

## 2020-08-27 DIAGNOSIS — H10.32 ACUTE BACTERIAL CONJUNCTIVITIS OF LEFT EYE: ICD-10-CM

## 2020-08-27 DIAGNOSIS — F32.0 CURRENT MILD EPISODE OF MAJOR DEPRESSIVE DISORDER WITHOUT PRIOR EPISODE (HCC): ICD-10-CM

## 2020-08-27 PROBLEM — M54.16 LUMBAR RADICULOPATHY: Status: ACTIVE | Noted: 2020-05-26

## 2020-08-27 PROCEDURE — 99214 OFFICE O/P EST MOD 30 MIN: CPT | Performed by: FAMILY MEDICINE

## 2020-08-27 RX ORDER — CELECOXIB 100 MG/1
CAPSULE ORAL
COMMUNITY
End: 2020-09-23

## 2020-08-27 RX ORDER — NEOMYCIN POLYMYXIN B SULFATES AND DEXAMETHASONE 3.5; 10000; 1 MG/ML; [USP'U]/ML; MG/ML
2 SUSPENSION/ DROPS OPHTHALMIC 4 TIMES DAILY
Qty: 10 ML | Refills: 0 | Status: SHIPPED | OUTPATIENT
Start: 2020-08-27 | End: 2020-09-01

## 2020-08-27 RX ORDER — SUMATRIPTAN 100 MG/1
100 TABLET, FILM COATED ORAL
Qty: 9 TAB | Refills: 3 | Status: SHIPPED | OUTPATIENT
Start: 2020-08-27 | End: 2020-09-23 | Stop reason: SDUPTHER

## 2020-08-27 RX ORDER — DULOXETIN HYDROCHLORIDE 30 MG/1
30 CAPSULE, DELAYED RELEASE ORAL 2 TIMES DAILY
COMMUNITY
End: 2020-09-23

## 2020-08-27 ASSESSMENT — FIBROSIS 4 INDEX: FIB4 SCORE: 1.3

## 2020-08-27 ASSESSMENT — PATIENT HEALTH QUESTIONNAIRE - PHQ9: CLINICAL INTERPRETATION OF PHQ2 SCORE: 0

## 2020-08-27 NOTE — ASSESSMENT & PLAN NOTE
Started on Cymbalta 3 weeks ago and her pain is improved and she is not crying all the time.  She has had 3 episodes were she could remember what happened the night before    Depression Screening    Little interest or pleasure in doing things?  0 - not at all  Feeling down, depressed , or hopeless? 0 - not at all  Patient Health Questionnaire Score: 0   Trouble falling or staying asleep-2 more than half the days  Feeling tired or having no energy-1 several days  Poor appetite or overeating-2-more than half the days  Feeling bad about herself 0 not at all  Trouble concentrating-1-several days  Moving or speaking so slowly - 1 -several days  Thoughts that you are better off dead-0-not at all  PHQ-9 7  If depressive symptoms identified deferred to follow up visit unless specifically addressed in assesment and plan.      Interpretation of PHQ-9 Total Score   Score Severity   1-4 Minimal Depression   5-9 Mild Depression   10-14 Moderate Depression   15-19 Moderately Severe Depression   20-27 Severe Depression

## 2020-08-27 NOTE — PATIENT INSTRUCTIONS
Bacterial Conjunctivitis, Adult  Bacterial conjunctivitis is an infection of your conjunctiva. This is the clear membrane that covers the white part of your eye and the inner part of your eyelid. This infection can make your eye:  · Red or pink.  · Itchy.  This condition spreads easily from person to person (is contagious) and from one eye to the other eye.  What are the causes?  · This condition is caused by germs (bacteria). You may get the infection if you come into close contact with:  ? A person who has the infection.  ? Items that have germs on them (are contaminated), such as face towels, contact lens solution, or eye makeup.  What increases the risk?  You are more likely to get this condition if you:  · Have contact with people who have the infection.  · Wear contact lenses.  · Have a sinus infection.  · Have had a recent eye injury or surgery.  · Have a weak body defense system (immune system).  · Have dry eyes.  What are the signs or symptoms?    · Thick, yellowish discharge from the eye.  · Tearing or watery eyes.  · Itchy eyes.  · Burning feeling in your eyes.  · Eye redness.  · Swollen eyelids.  · Blurred vision.  How is this treated?    · Antibiotic eye drops or ointment.  · Antibiotic medicine taken by mouth. This is used for infections that do not get better with drops or ointment or that last more than 10 days.  · Cool, wet cloths placed on the eyes.  · Artificial tears used 2-6 times a day.  Follow these instructions at home:  Medicines  · Take or apply your antibiotic medicine as told by your doctor. Do not stop taking or applying the antibiotic even if you start to feel better.  · Take or apply over-the-counter and prescription medicines only as told by your doctor.  · Do not touch your eyelid with the eye-drop bottle or the ointment tube.  Managing discomfort  · Wipe any fluid from your eye with a warm, wet washcloth or a cotton ball.  · Place a clean, cool, wet cloth on your eye. Do this for  10-20 minutes, 3-4 times per day.  General instructions  · Do not wear contacts until the infection is gone. Wear glasses until your doctor says it is okay to wear contacts again.  · Do not wear eye makeup until the infection is gone. Throw away old eye makeup.  · Change or wash your pillowcase every day.  · Do not share towels or washcloths.  · Wash your hands often with soap and water. Use paper towels to dry your hands.  · Do not touch or rub your eyes.  · Do not drive or use heavy machinery if your vision is blurred.  Contact a doctor if:  · You have a fever.  · You do not get better after 10 days.  Get help right away if:  · You have a fever and your symptoms get worse all of a sudden.  · You have very bad pain when you move your eye.  · Your face:  ? Hurts.  ? Is red.  ? Is swollen.  · You have sudden loss of vision.  Summary  · Bacterial conjunctivitis is an infection of your conjunctiva.  · This infection spreads easily from person to person.  · Wash your hands often with soap and water. Use paper towels to dry your hands.  · Take or apply your antibiotic medicine as told by your doctor.  · Contact a doctor if you have a fever or you do not get better after 10 days.  This information is not intended to replace advice given to you by your health care provider. Make sure you discuss any questions you have with your health care provider.  Document Released: 09/26/2009 Document Revised: 04/07/2020 Document Reviewed: 07/24/2019  Elsevier Patient Education © 2020 [employer]

## 2020-09-08 ENCOUNTER — HOSPITAL ENCOUNTER (OUTPATIENT)
Dept: RADIOLOGY | Facility: MEDICAL CENTER | Age: 65
End: 2020-09-08
Attending: FAMILY MEDICINE
Payer: COMMERCIAL

## 2020-09-08 DIAGNOSIS — G43.009 MIGRAINE WITHOUT AURA AND WITHOUT STATUS MIGRAINOSUS, NOT INTRACTABLE: ICD-10-CM

## 2020-09-08 DIAGNOSIS — R41.3 MEMORY CHANGES: ICD-10-CM

## 2020-09-08 DIAGNOSIS — G44.52 NEW DAILY PERSISTENT HEADACHE: ICD-10-CM

## 2020-09-08 PROCEDURE — 70551 MRI BRAIN STEM W/O DYE: CPT

## 2020-09-09 DIAGNOSIS — G31.9 CEREBELLAR ATROPHY (HCC): ICD-10-CM

## 2020-09-09 DIAGNOSIS — G43.009 MIGRAINE WITHOUT AURA AND WITHOUT STATUS MIGRAINOSUS, NOT INTRACTABLE: ICD-10-CM

## 2020-09-09 DIAGNOSIS — R41.3 MEMORY CHANGES: ICD-10-CM

## 2020-09-10 ENCOUNTER — HOSPITAL ENCOUNTER (EMERGENCY)
Facility: MEDICAL CENTER | Age: 65
End: 2020-09-10
Attending: EMERGENCY MEDICINE
Payer: COMMERCIAL

## 2020-09-10 VITALS
HEART RATE: 83 BPM | SYSTOLIC BLOOD PRESSURE: 111 MMHG | TEMPERATURE: 99.5 F | RESPIRATION RATE: 18 BRPM | HEIGHT: 65 IN | DIASTOLIC BLOOD PRESSURE: 82 MMHG | OXYGEN SATURATION: 94 % | BODY MASS INDEX: 33.2 KG/M2 | WEIGHT: 199.3 LBS

## 2020-09-10 DIAGNOSIS — G43.909 MIGRAINE WITHOUT STATUS MIGRAINOSUS, NOT INTRACTABLE, UNSPECIFIED MIGRAINE TYPE: ICD-10-CM

## 2020-09-10 LAB
ANION GAP SERPL CALC-SCNC: 13 MMOL/L (ref 7–16)
APPEARANCE UR: CLEAR
BASOPHILS # BLD AUTO: 0.5 % (ref 0–1.8)
BASOPHILS # BLD: 0.04 K/UL (ref 0–0.12)
BILIRUB UR QL STRIP.AUTO: NEGATIVE
BUN SERPL-MCNC: 13 MG/DL (ref 8–22)
CALCIUM SERPL-MCNC: 9.5 MG/DL (ref 8.4–10.2)
CHLORIDE SERPL-SCNC: 100 MMOL/L (ref 96–112)
CO2 SERPL-SCNC: 27 MMOL/L (ref 20–33)
COLOR UR: YELLOW
CREAT SERPL-MCNC: 0.61 MG/DL (ref 0.5–1.4)
EOSINOPHIL # BLD AUTO: 0.11 K/UL (ref 0–0.51)
EOSINOPHIL NFR BLD: 1.3 % (ref 0–6.9)
ERYTHROCYTE [DISTWIDTH] IN BLOOD BY AUTOMATED COUNT: 44.2 FL (ref 35.9–50)
GLUCOSE SERPL-MCNC: 109 MG/DL (ref 65–99)
GLUCOSE UR STRIP.AUTO-MCNC: NEGATIVE MG/DL
HCT VFR BLD AUTO: 44.2 % (ref 37–47)
HGB BLD-MCNC: 14.7 G/DL (ref 12–16)
IMM GRANULOCYTES # BLD AUTO: 0.06 K/UL (ref 0–0.11)
IMM GRANULOCYTES NFR BLD AUTO: 0.7 % (ref 0–0.9)
KETONES UR STRIP.AUTO-MCNC: 15 MG/DL
LEUKOCYTE ESTERASE UR QL STRIP.AUTO: NEGATIVE
LYMPHOCYTES # BLD AUTO: 2.59 K/UL (ref 1–4.8)
LYMPHOCYTES NFR BLD: 29.5 % (ref 22–41)
MCH RBC QN AUTO: 30.6 PG (ref 27–33)
MCHC RBC AUTO-ENTMCNC: 33.3 G/DL (ref 33.6–35)
MCV RBC AUTO: 92.1 FL (ref 81.4–97.8)
MICRO URNS: ABNORMAL
MONOCYTES # BLD AUTO: 0.7 K/UL (ref 0–0.85)
MONOCYTES NFR BLD AUTO: 8 % (ref 0–13.4)
NEUTROPHILS # BLD AUTO: 5.29 K/UL (ref 2–7.15)
NEUTROPHILS NFR BLD: 60 % (ref 44–72)
NITRITE UR QL STRIP.AUTO: NEGATIVE
NRBC # BLD AUTO: 0 K/UL
NRBC BLD-RTO: 0 /100 WBC
PH UR STRIP.AUTO: 5.5 [PH] (ref 5–8)
PLATELET # BLD AUTO: 270 K/UL (ref 164–446)
PMV BLD AUTO: 9.5 FL (ref 9–12.9)
POTASSIUM SERPL-SCNC: 3.6 MMOL/L (ref 3.6–5.5)
PROT UR QL STRIP: NEGATIVE MG/DL
RBC # BLD AUTO: 4.8 M/UL (ref 4.2–5.4)
RBC UR QL AUTO: NEGATIVE
SODIUM SERPL-SCNC: 140 MMOL/L (ref 135–145)
SP GR UR STRIP.AUTO: 1.01
WBC # BLD AUTO: 8.8 K/UL (ref 4.8–10.8)

## 2020-09-10 PROCEDURE — 99284 EMERGENCY DEPT VISIT MOD MDM: CPT

## 2020-09-10 PROCEDURE — 96366 THER/PROPH/DIAG IV INF ADDON: CPT

## 2020-09-10 PROCEDURE — 80048 BASIC METABOLIC PNL TOTAL CA: CPT

## 2020-09-10 PROCEDURE — A9270 NON-COVERED ITEM OR SERVICE: HCPCS | Performed by: EMERGENCY MEDICINE

## 2020-09-10 PROCEDURE — 96365 THER/PROPH/DIAG IV INF INIT: CPT

## 2020-09-10 PROCEDURE — 96375 TX/PRO/DX INJ NEW DRUG ADDON: CPT

## 2020-09-10 PROCEDURE — 81003 URINALYSIS AUTO W/O SCOPE: CPT

## 2020-09-10 PROCEDURE — 700105 HCHG RX REV CODE 258: Performed by: EMERGENCY MEDICINE

## 2020-09-10 PROCEDURE — 700111 HCHG RX REV CODE 636 W/ 250 OVERRIDE (IP): Performed by: EMERGENCY MEDICINE

## 2020-09-10 PROCEDURE — 85025 COMPLETE CBC W/AUTO DIFF WBC: CPT

## 2020-09-10 PROCEDURE — 700102 HCHG RX REV CODE 250 W/ 637 OVERRIDE(OP): Performed by: EMERGENCY MEDICINE

## 2020-09-10 RX ORDER — MAGNESIUM SULFATE HEPTAHYDRATE 500 MG/ML
2 INJECTION, SOLUTION INTRAMUSCULAR; INTRAVENOUS ONCE
Status: DISCONTINUED | OUTPATIENT
Start: 2020-09-10 | End: 2020-09-10

## 2020-09-10 RX ORDER — DIPHENHYDRAMINE HYDROCHLORIDE 50 MG/ML
25 INJECTION INTRAMUSCULAR; INTRAVENOUS ONCE
Status: COMPLETED | OUTPATIENT
Start: 2020-09-10 | End: 2020-09-10

## 2020-09-10 RX ORDER — KETOROLAC TROMETHAMINE 30 MG/ML
15 INJECTION, SOLUTION INTRAMUSCULAR; INTRAVENOUS ONCE
Status: DISCONTINUED | OUTPATIENT
Start: 2020-09-10 | End: 2020-09-10

## 2020-09-10 RX ORDER — PROCHLORPERAZINE EDISYLATE 5 MG/ML
10 INJECTION INTRAMUSCULAR; INTRAVENOUS ONCE
Status: COMPLETED | OUTPATIENT
Start: 2020-09-10 | End: 2020-09-10

## 2020-09-10 RX ORDER — SODIUM CHLORIDE 9 MG/ML
1000 INJECTION, SOLUTION INTRAVENOUS ONCE
Status: COMPLETED | OUTPATIENT
Start: 2020-09-10 | End: 2020-09-10

## 2020-09-10 RX ORDER — MAGNESIUM SULFATE HEPTAHYDRATE 40 MG/ML
2 INJECTION, SOLUTION INTRAVENOUS ONCE
Status: COMPLETED | OUTPATIENT
Start: 2020-09-10 | End: 2020-09-10

## 2020-09-10 RX ORDER — ACETAMINOPHEN 500 MG
1000 TABLET ORAL ONCE
Status: COMPLETED | OUTPATIENT
Start: 2020-09-10 | End: 2020-09-10

## 2020-09-10 RX ORDER — KETOROLAC TROMETHAMINE 30 MG/ML
30 INJECTION, SOLUTION INTRAMUSCULAR; INTRAVENOUS ONCE
Status: COMPLETED | OUTPATIENT
Start: 2020-09-10 | End: 2020-09-10

## 2020-09-10 RX ADMIN — ACETAMINOPHEN 1000 MG: 500 TABLET, FILM COATED ORAL at 19:42

## 2020-09-10 RX ADMIN — KETOROLAC TROMETHAMINE 30 MG: 30 INJECTION, SOLUTION INTRAMUSCULAR at 20:15

## 2020-09-10 RX ADMIN — DIPHENHYDRAMINE HYDROCHLORIDE 25 MG: 50 INJECTION, SOLUTION INTRAMUSCULAR; INTRAVENOUS at 20:00

## 2020-09-10 RX ADMIN — PROCHLORPERAZINE EDISYLATE 10 MG: 5 INJECTION INTRAMUSCULAR; INTRAVENOUS at 20:01

## 2020-09-10 RX ADMIN — SODIUM CHLORIDE 1000 ML: 9 INJECTION, SOLUTION INTRAVENOUS at 20:01

## 2020-09-10 RX ADMIN — MAGNESIUM SULFATE 2 G: 2 INJECTION INTRAVENOUS at 20:14

## 2020-09-10 ASSESSMENT — FIBROSIS 4 INDEX: FIB4 SCORE: 1.3

## 2020-09-11 NOTE — ED TRIAGE NOTES
"Chief Complaint   Patient presents with   • Headache     started yesterday at 1500, pt states none of her migraine medications are working, \"im sensitive to everything\", pt denies any aura at this time      /89   Pulse (!) 105   Temp 37.5 °C (99.5 °F) (Temporal)   Resp 18   Ht 1.651 m (5' 5\")   Wt 90.4 kg (199 lb 4.7 oz)   SpO2 98%   BMI 33.16 kg/m²     Pt arrived w/ above concern. Pt c/o nausea at times. Last medication was taken at 1300 - Imitrex 100mg    COVID screen negative, mask in place     Pt has had migraines daily for past month, had outpt MRI at this facility last tuesday  "

## 2020-09-11 NOTE — ED PROVIDER NOTES
"ED Provider Note    CHIEF COMPLAINT  Chief Complaint   Patient presents with   • Headache     started yesterday at 1500, pt states none of her migraine medications are working, \"im sensitive to everything\", pt denies any aura at this time      HPI  Patient is a 65-year-old female with a past medical history of thyroid dysfunction and chronic migraines who is currently on Imitrex who presents emergency room for worsening headache.  Patient denies any recent traumatic injuries, denies any fevers but states that yesterday at 3 PM the chronic headache that she has had came back with more intense severity.  Has been gradually increasing in severity.  There is some changes with posture, she reports photosensitivity without photophobia, positive sound sensitivity and notes that these are common for her.  She took 3 doses of Imitrex without improvement.  She denies fevers, she denies vomiting but endorses persistent nausea with worsening headache.  No other recent sick exposures.    PPE Note: I personally donned full PPE for all patient encounters during this visit, including being clean-shaven with an N95 respirator mask, gloves, and goggles.     Patient's chart reviewed and the patient had a outpatient MRI done on 9/8 that showed no acute hemorrhage, ischemia or mass.  There is mild to moderate atrophy and nonspecific patterns.  Mild white matter changes with no other acute disease process.    REVIEW OF SYSTEMS  See HPI for further details. All other systems are negative.     PAST MEDICAL HISTORY   has a past medical history of Anesthesia, Arthritis, ASTHMA, Asthma (7/11/2014), Breath shortness, Chest pain (7/10/2014), Endometriosis of vagina (9/19/2016), H/O migraine (7/11/2014), HSV infection (9/19/2016), Hyperlipidemia (9/19/2016), Hyperthyroidism, Knee osteoarthritis (9/12/2014), Lymphocytic thyroiditis (9/19/2016), Migraine without aura and without status migrainosus, not intractable (9/19/2016), Psychiatric problem, " "Renal colic (2016), Renal disorder, Rotator cuff tear (2014), and Vitamin D deficiency (2016).    SOCIAL HISTORY  Social History     Tobacco Use   • Smoking status: Former Smoker     Packs/day: 0.25     Years: 10.00     Pack years: 2.50     Types: Cigarettes     Quit date: 1980     Years since quittin.8   • Smokeless tobacco: Never Used   Substance and Sexual Activity   • Alcohol use: Yes     Alcohol/week: 2.5 oz     Types: 5 Standard drinks or equivalent per week     Comment: occas   • Drug use: No   • Sexual activity: Yes     Partners: Male     SURGICAL HISTORY   has a past surgical history that includes shoulder arthroscopy; vaishnavi by laparoscopy; anesth,yudelka del after neuraxial anesth; knee arthroplasty total (2014); and knee arthroplasty total (Left, 10/23/2015).    CURRENT MEDICATIONS  Home Medications    **Home medications have not yet been reviewed for this encounter**       ALLERGIES  Allergies   Allergen Reactions   • Gluten      GI upset   • Morphine Vomiting   • Pcn [Penicillins] Rash     Tolerates Ancef.   • Percocet [Oxycodone-Acetaminophen] Unspecified     migraine  migraine     PHYSICAL EXAM  VITAL SIGNS: /82   Pulse 83   Temp 37.5 °C (99.5 °F) (Temporal)   Resp 18   Ht 1.651 m (5' 5\")   Wt 90.4 kg (199 lb 4.7 oz)   SpO2 94%   BMI 33.16 kg/m²   Pulse ox interpretation: I interpret this pulse ox as normal.  Genl: F sitting in gurney uncomfortably, speaking clearly, appears in mild distress   Head: NC/AT   ENT: Mucous membranes moist, posterior pharynx clear, uvula midline, nares patent bilaterally   Eyes: Normal sclera, pupils equal round reactive to light, + photosensitivity  Neck: Supple, FROM, no LAD appreciated   Pulmonary: Lungs are clear to auscultation bilaterally  Chest: No TTP  CV:  tachycardia, no murmur appreciated, pulses 2+ in both upper and lower extremities,  Abdomen: soft, NT/ND; no rebound/guarding, no masses palpated, no HSM   : no CVA or " suprapubic tenderness   Musculoskeletal: Pain free ROM of the neck. Moving upper and lower extremities and spontaneous in coordinated fashion  Neuro: Mental Status: Speech fluent without errors. Follows all commands. No dysarthria or apraxia.  Cranial Nerves: Pupils equal round and reactive to light. Extraocular motion intact. Visual fields intact. No nystagmus. CN V1-V3 intact to light touch. No facial asymmetry. Hearing clinically intact bilaterally. Tongue protrusion midline. No uvular deviation. Normal shoulder shrug and head turn.  Motor:  RUE: 5/5 with hand , 5/5 with flexion at the elbow 5/5 with extension at the elbow  LUE: 5/5 with hand , 5/5 with flexion at the elbow 5/5 with extension at the elbow  RLE: 5/5 with leg raise, 5/5 with plantar flexion, 5/5 with dorsal flexion  LLE: 5/5 with leg raise, 5/5 with plantar flexion, 5/5 with dorsal flexion  Sensation to light touch intact throughout  Reflexes 2+ Patellar tendons  Gait not assessed  Rapidly alternating movements without difficulty  Psych: Patient has an appropriate affect and behavior  Skin: No rash or lesions.  No pallor or jaundice.  No cyanosis.  Warm and dry.     DIAGNOSTIC STUDIES / PROCEDURES    LABS  Labs Reviewed   CBC WITH DIFFERENTIAL - Abnormal; Notable for the following components:       Result Value    MCHC 33.3 (*)     All other components within normal limits   BASIC METABOLIC PANEL - Abnormal; Notable for the following components:    Glucose 109 (*)     All other components within normal limits   URINALYSIS,CULTURE IF INDICATED - Abnormal; Notable for the following components:    Ketones 15 (*)     All other components within normal limits   ESTIMATED GFR     RADIOLOGY  No orders to display     COURSE & MEDICAL DECISION MAKING  Pertinent Labs & Imaging studies reviewed. (See chart for details)    DDX:  Subdural hematoma  Epidural hematoma  Subarachnoid hemorrhage  Intracranial  mass/Neoplasm  Meningitis  Dehydration  UTI/cystitis  Migraine  Tension headache  Cluster headache  Shingles  Concussion    MDM    Initial evaluation at 1915:  Patient is a 65-year-old female presents with the symptoms as described above.  The patient has a history of persistent and daily headaches and has been prescribed medications in the past.  She was having increasing severity that prompted outpatient MRI scan that was performed 2 days ago and is unremarkable.  At the time of my evaluation she is having signs likely of exacerbating complicated migraine versus complex headache however the broad differential above is considered.  She has not had acute traumatic injuries, she has not had any clinical signs of trauma, nor is she demonstrating any focal signs of meningismus, positional changes on exam do not show evidence of meningeal irritation.  She is not having vision changes, thunderclap onset, or anything to suggest an acute vascular emergency based on the previous and very recent medical imaging.  IV access was established, fluid hydration initiated and symptomatic medications are administered in stepwise fashion.  She is frequently reevaluated and found to have interval improvement with no focal neurological deficits manifesting themselves.  She reports feeling much improved and would like to be discharged home.  She is ambulated, tolerates p.o. intake without difficulty, and will follow-up with her outpatient physician     HYDRATION: Based on the patient's presentation of Dehydration and Other facilitate HA medications the patient was given IV fluids. IV Hydration was used because oral hydration was not as rapid as required. Upon recheck following hydration, the patient was improved.      FINAL IMPRESSION  Visit Diagnoses     ICD-10-CM   1. Migraine without status migrainosus, not intractable, unspecified migraine type  G43.909     Electronically signed by: Mike Middleton M.D., 9/10/2020 7:14 PM

## 2020-09-23 ENCOUNTER — OFFICE VISIT (OUTPATIENT)
Dept: MEDICAL GROUP | Facility: LAB | Age: 65
End: 2020-09-23
Payer: COMMERCIAL

## 2020-09-23 ENCOUNTER — HOSPITAL ENCOUNTER (OUTPATIENT)
Facility: MEDICAL CENTER | Age: 65
End: 2020-09-23
Attending: NURSE PRACTITIONER
Payer: COMMERCIAL

## 2020-09-23 ENCOUNTER — HOSPITAL ENCOUNTER (OUTPATIENT)
Dept: LAB | Facility: MEDICAL CENTER | Age: 65
End: 2020-09-23
Attending: FAMILY MEDICINE
Payer: COMMERCIAL

## 2020-09-23 VITALS
BODY MASS INDEX: 33.49 KG/M2 | TEMPERATURE: 98.6 F | RESPIRATION RATE: 16 BRPM | WEIGHT: 201 LBS | HEART RATE: 80 BPM | OXYGEN SATURATION: 95 % | SYSTOLIC BLOOD PRESSURE: 110 MMHG | DIASTOLIC BLOOD PRESSURE: 70 MMHG | HEIGHT: 65 IN

## 2020-09-23 DIAGNOSIS — E55.9 VITAMIN D DEFICIENCY: ICD-10-CM

## 2020-09-23 DIAGNOSIS — E06.3 LYMPHOCYTIC THYROIDITIS: ICD-10-CM

## 2020-09-23 DIAGNOSIS — Z12.4 SCREENING FOR CERVICAL CANCER: ICD-10-CM

## 2020-09-23 DIAGNOSIS — F32.0 CURRENT MILD EPISODE OF MAJOR DEPRESSIVE DISORDER WITHOUT PRIOR EPISODE (HCC): ICD-10-CM

## 2020-09-23 DIAGNOSIS — Z01.419 WELL WOMAN EXAM WITH ROUTINE GYNECOLOGICAL EXAM: ICD-10-CM

## 2020-09-23 DIAGNOSIS — G43.009 MIGRAINE WITHOUT AURA AND WITHOUT STATUS MIGRAINOSUS, NOT INTRACTABLE: ICD-10-CM

## 2020-09-23 DIAGNOSIS — Z23 NEED FOR VACCINATION: ICD-10-CM

## 2020-09-23 LAB
25(OH)D3 SERPL-MCNC: 34 NG/ML (ref 30–100)
T4 FREE SERPL-MCNC: 1.62 NG/DL (ref 0.93–1.7)
TSH SERPL DL<=0.005 MIU/L-ACNC: 1.78 UIU/ML (ref 0.38–5.33)

## 2020-09-23 PROCEDURE — 90471 IMMUNIZATION ADMIN: CPT | Performed by: FAMILY MEDICINE

## 2020-09-23 PROCEDURE — 99396 PREV VISIT EST AGE 40-64: CPT | Mod: 25 | Performed by: FAMILY MEDICINE

## 2020-09-23 PROCEDURE — 87624 HPV HI-RISK TYP POOLED RSLT: CPT

## 2020-09-23 PROCEDURE — 90750 HZV VACC RECOMBINANT IM: CPT | Performed by: FAMILY MEDICINE

## 2020-09-23 PROCEDURE — 82306 VITAMIN D 25 HYDROXY: CPT

## 2020-09-23 PROCEDURE — 36415 COLL VENOUS BLD VENIPUNCTURE: CPT

## 2020-09-23 PROCEDURE — 84443 ASSAY THYROID STIM HORMONE: CPT

## 2020-09-23 PROCEDURE — 88175 CYTOPATH C/V AUTO FLUID REDO: CPT

## 2020-09-23 PROCEDURE — 84439 ASSAY OF FREE THYROXINE: CPT

## 2020-09-23 RX ORDER — ESCITALOPRAM OXALATE 10 MG/1
10 TABLET ORAL DAILY
Qty: 30 TAB | Refills: 3 | Status: SHIPPED | OUTPATIENT
Start: 2020-09-23 | End: 2021-01-18

## 2020-09-23 RX ORDER — SUMATRIPTAN 100 MG/1
100 TABLET, FILM COATED ORAL
Qty: 9 TAB | Refills: 11 | Status: SHIPPED | OUTPATIENT
Start: 2020-09-23 | End: 2021-01-12

## 2020-09-23 ASSESSMENT — FIBROSIS 4 INDEX: FIB4 SCORE: 1.09

## 2020-09-23 ASSESSMENT — PATIENT HEALTH QUESTIONNAIRE - PHQ9
CLINICAL INTERPRETATION OF PHQ2 SCORE: 2
SUM OF ALL RESPONSES TO PHQ QUESTIONS 1-9: 7
5. POOR APPETITE OR OVEREATING: 1 - SEVERAL DAYS

## 2020-09-23 NOTE — PROGRESS NOTES
SUBJECTIVE:   Chief Complaint   Patient presents with   • Gynecologic Exam   • Medication Follow-up     tapering off of cymbalta       65 y.o. female for annual routine gynecologic exam    OB History    Para Term  AB Living   2 1 0 0 0 1   SAB TAB Ectopic Molar Multiple Live Births   0 0 0 0 0 0      Social History     Substance and Sexual Activity   Sexual Activity Yes   • Partners: Male         No significant bloating/fluid retention, pelvic pain, or dyspareunia. No vaginal discharge  No breast tenderness, mass, nipple discharge, changes in size or contour, or abnormal cyclic discomfort.        ROS:    No urinary tract symptoms, no incontinence.   No abdominal pain, change in bowel habits, black or bloody stools.    No unusual headaches, no visual changes, menstrual migraines   No prolonged cough. No dyspnea or chest pain on exertion.  No depression, labile mood, anxiety ,libido changes, insomnia.  No new/concerning skin lesions,        Social History     Tobacco Use   • Smoking status: Former Smoker     Packs/day: 0.25     Years: 10.00     Pack years: 2.50     Types: Cigarettes     Quit date: 1980     Years since quittin.9   • Smokeless tobacco: Never Used   Substance Use Topics   • Alcohol use: Yes     Alcohol/week: 2.5 oz     Types: 5 Standard drinks or equivalent per week     Comment: occas   • Drug use: No       Patient Active Problem List    Diagnosis Date Noted   • Current mild episode of major depressive disorder without prior episode (HCC) 2020   • Memory changes 2020   • Lumbar radiculopathy 2020   • Mild intermittent asthma without complication 2020   • Dense breast tissue on mammogram 2020   • Spondylolisthesis of lumbar region 2020   • Peptic ulcer 2019   • Epigastric pain 10/09/2018   • Obesity (BMI 30-39.9) 2017   • Endometriosis of vagina 2016   • Lymphocytic thyroiditis 2016   • Renal colic 2016   • Vitamin D  deficiency 2016   • Dyslipidemia 2016   • Migraine without aura and without status migrainosus, not intractable 2016   • HSV infection 2016   • Knee osteoarthritis 2014   • Hypokalemia 2014   • S/P rotator cuff repair 2014   • Shoulder impingement 2014       Past Medical History:   Diagnosis Date   • Anesthesia     wakes up manic   • Arthritis    • ASTHMA    • Asthma 2014   • Breath shortness     asthma related   • Chest pain 7/10/2014   • Endometriosis of vagina 2016    Overview:  AREA NOT SPECIFIED    • H/O migraine 2014   • HSV infection 2016   • Hyperlipidemia 2016   • Hyperthyroidism    • Knee osteoarthritis 2014   • Lymphocytic thyroiditis 2016   • Migraine without aura and without status migrainosus, not intractable 2016   • Psychiatric problem    • Renal colic 2016   • Renal disorder    • Rotator cuff tear 2014   • Vitamin D deficiency 2016     Past Surgical History:   Procedure Laterality Date   • KNEE ARTHROPLASTY TOTAL Left 10/23/2015    Procedure: LEFT TOTAL KNEE REPLACEMENT;  Surgeon: Leeroy Camacho M.D.;  Location: SURGERY [facility];  Service:    • KNEE ARTHROPLASTY TOTAL  2014    Performed by Leeroy Camacho M.D. at SURGERY [facility]   • TIFFANY BY LAPAROSCOPY     • PB ANESTH,NANCY DEL AFTER NEURAXIAL ANESTH     • SHOULDER ARTHROSCOPY           Family History   Problem Relation Age of Onset   • Diabetes Mother    • Arthritis Mother    • Lung Disease Mother         COPD   • Cancer Mother         cervical   • Heart Disease Father    • Alcohol/Drug Father    • Cancer Sister         thyroid   • Heart Disease Brother      Family Status   Relation Name Status   • Mo     • Fa     • Sis  Alive   • Bro  Alive   • Sis  Alive   • Sis  Alive         Current medicines (including changes today)  Current Outpatient Medications   Medication Sig Dispense Refill   • sumatriptan (IMITREX) 100 MG  "tablet Take 1 Tab by mouth Once PRN for Migraine for up to 1 dose. Max 2 in 24 hr 9 Tab 11   • escitalopram (LEXAPRO) 10 MG Tab Take 1 Tab by mouth every day. 30 Tab 3   • SYNTHROID 150 MCG Tab TAKE ONE TABLET BY MOUTH EVERY MORNING ON AN EMPTY STOMACH 90 Tab 2   • albuterol 108 (90 Base) MCG/ACT Aero Soln inhalation aerosol Inhale 2 Puffs by mouth every four hours as needed for Shortness of Breath (cough and wheezing). 1 Inhaler 3   • acyclovir (ZOVIRAX) 400 MG tablet TAKE ONE TABLET BY MOUTH THREE TIMES A DAY 30 Tab 3   • SYNTHROID 137 MCG Tab Take 1 Tab by mouth Every morning on an empty stomach. 90 Tab 3   • Melatonin 10 MG Tab Take  by mouth.     • omeprazole (PRILOSEC) 40 MG delayed-release capsule Take 1 Cap by mouth every day. 30 Cap 2     No current facility-administered medications for this visit.            Allergies: Gluten, Morphine, Pcn [penicillins], and Percocet [oxycodone-acetaminophen]     Preventive Care:  Health Maintenance Topics with due status: Overdue       Topic Date Due    HEPATITIS C SCREENING 1955    IMM ZOSTER VACCINES 02/06/2006    BONE DENSITY 05/23/2019    PAP SMEAR 05/09/2020    IMM INFLUENZA 09/01/2020       OBJECTIVE:   /70 (BP Location: Right arm, Patient Position: Sitting, BP Cuff Size: Adult)   Pulse 80   Temp 37 °C (98.6 °F) (Temporal)   Resp 16   Ht 1.648 m (5' 4.88\")   Wt 91.2 kg (201 lb)   SpO2 95%   BMI 33.57 kg/m²   Body mass index is 33.57 kg/m².      Gen: Well developed, well nourished in no acute distress.   Skin: Pink, warm, and dry. No rashes  Eyes: conjunctiva non-injected, sclera non-icteric. EOMs intact.   Nasal mucosa without edema nor erythema. No facial tenderness  Ears:Pinna normal. TM pearly gray.   Nose: Nares patent.  No discharge.  Oral mucous membranes pink and moist with no lesions.  Neck: Supple, trachea midline. No adenopathy or masses in the neck or supraclavicular regions. No thyromegaly.  Lungs: Effort is normal. Clear to " auscultation bilaterally with good excursion.  CV: regular rate and rhythm. No murmur.  Abdomen: soft, nontender, + BS. No HSM.  No CVAT  Ext: no edema, color normal, vascularity normal, temperature normal  Alert and oriented Eye contact is good, speech goal directed, affect calm     Breast Exam: Performed with instruction during examination. No axillary lymphadenopathy, no skin changes, no dominant masses. No nipple retraction  Pelvic Exam:  Normal external genitalia with no lesions. Normal vaginal mucosa with normal rugation and no discharge. Cervix with no visible lesions. No cervical motion tenderness. Uterus is normal sized with no masses. No adnexal tenderness or enlargement appreciated. Pap is obtained and the specimen was sent to lab      <ASSESSMENT and PLAN>  1. Well woman exam with routine gynecological exam  Routine anticipatory guidance.  Pap smear obtained.  Health counseling as below    2. Need for vaccination    - Shingrix Vaccine    3. Screening for cervical cancer    - THINPREP PAP WITH HPV; Future    4. Vitamin D deficiency  Check vitamin D level and adjust supplementation as needed  - VITAMIN D,25 HYDROXY; Future    5. Lymphocytic thyroiditis  Check thyroid labs and adjust levothyroxine dose as needed  - TSH; Future  - FREE THYROXINE; Future    6. Current mild episode of major depressive disorder without prior episode (HCC)  Start escitalopram 10 mg daily.  Recheck in 3 months.  - escitalopram (LEXAPRO) 10 MG Tab; Take 1 Tab by mouth every day.  Dispense: 30 Tab; Refill: 3    7. Migraine without aura and without status migrainosus, not intractable  Refill sumatriptan for as needed use  - sumatriptan (IMITREX) 100 MG tablet; Take 1 Tab by mouth Once PRN for Migraine for up to 1 dose. Max 2 in 24 hr  Dispense: 9 Tab; Refill: 11      Discussed  breast self exam, mammography screening, menopause, osteoporosis, adequate intake of calcium and vitamin D, diet and exercise     Return in about 3 months  (around 12/23/2020).

## 2020-09-23 NOTE — PATIENT INSTRUCTIONS
Escitalopram tablets  What is this medicine?  ESCITALOPRAM (es sye RANDY oh pram) is used to treat depression and certain types of anxiety.  This medicine may be used for other purposes; ask your health care provider or pharmacist if you have questions.  COMMON BRAND NAME(S): Lexapro  What should I tell my health care provider before I take this medicine?  They need to know if you have any of these conditions:  · bipolar disorder or a family history of bipolar disorder  · diabetes  · glaucoma  · heart disease  · kidney or liver disease  · receiving electroconvulsive therapy  · seizures (convulsions)  · suicidal thoughts, plans, or attempt by you or a family member  · an unusual or allergic reaction to escitalopram, the related drug citalopram, other medicines, foods, dyes, or preservatives  · pregnant or trying to become pregnant  · breast-feeding  How should I use this medicine?  Take this medicine by mouth with a glass of water. Follow the directions on the prescription label. You can take it with or without food. If it upsets your stomach, take it with food. Take your medicine at regular intervals. Do not take it more often than directed. Do not stop taking this medicine suddenly except upon the advice of your doctor. Stopping this medicine too quickly may cause serious side effects or your condition may worsen.  A special MedGuide will be given to you by the pharmacist with each prescription and refill. Be sure to read this information carefully each time.  Talk to your pediatrician regarding the use of this medicine in children. Special care may be needed.  Overdosage: If you think you have taken too much of this medicine contact a poison control center or emergency room at once.  NOTE: This medicine is only for you. Do not share this medicine with others.  What if I miss a dose?  If you miss a dose, take it as soon as you can. If it is almost time for your next dose, take only that dose. Do not take double or  extra doses.  What may interact with this medicine?  Do not take this medicine with any of the following medications:  · certain medicines for fungal infections like fluconazole, itraconazole, ketoconazole, posaconazole, voriconazole  · cisapride  · citalopram  · dronedarone  · linezolid  · MAOIs like Carbex, Eldepryl, Marplan, Nardil, and Parnate  · methylene blue (injected into a vein)  · pimozide  · thioridazine  This medicine may also interact with the following medications:  · alcohol  · amphetamines  · aspirin and aspirin-like medicines  · carbamazepine  · certain medicines for depression, anxiety, or psychotic disturbances  · certain medicines for migraine headache like almotriptan, eletriptan, frovatriptan, naratriptan, rizatriptan, sumatriptan, zolmitriptan  · certain medicines for sleep  · certain medicines that treat or prevent blood clots like warfarin, enoxaparin, dalteparin  · cimetidine  · diuretics  · dofetilide  · fentanyl  · furazolidone  · isoniazid  · lithium  · metoprolol  · NSAIDs, medicines for pain and inflammation, like ibuprofen or naproxen  · other medicines that prolong the QT interval (cause an abnormal heart rhythm)  · procarbazine  · rasagiline  · supplements like [place]'s wort, kava kava, valerian  · tramadol  · tryptophan  · ziprasidone  This list may not describe all possible interactions. Give your health care provider a list of all the medicines, herbs, non-prescription drugs, or dietary supplements you use. Also tell them if you smoke, drink alcohol, or use illegal drugs. Some items may interact with your medicine.  What should I watch for while using this medicine?  Tell your doctor if your symptoms do not get better or if they get worse. Visit your doctor or health care professional for regular checks on your progress. Because it may take several weeks to see the full effects of this medicine, it is important to continue your treatment as prescribed by your doctor.  Patients  and their families should watch out for new or worsening thoughts of suicide or depression. Also watch out for sudden changes in feelings such as feeling anxious, agitated, panicky, irritable, hostile, aggressive, impulsive, severely restless, overly excited and hyperactive, or not being able to sleep. If this happens, especially at the beginning of treatment or after a change in dose, call your health care professional.  You may get drowsy or dizzy. Do not drive, use machinery, or do anything that needs mental alertness until you know how this medicine affects you. Do not stand or sit up quickly, especially if you are an older patient. This reduces the risk of dizzy or fainting spells. Alcohol may interfere with the effect of this medicine. Avoid alcoholic drinks.  Your mouth may get dry. Chewing sugarless gum or sucking hard candy, and drinking plenty of water may help. Contact your doctor if the problem does not go away or is severe.  What side effects may I notice from receiving this medicine?  Side effects that you should report to your doctor or health care professional as soon as possible:  · allergic reactions like skin rash, itching or hives, swelling of the face, lips, or tongue  · anxious  · black, tarry stools  · changes in vision  · confusion  · elevated mood, decreased need for sleep, racing thoughts, impulsive behavior  · eye pain  · fast, irregular heartbeat  · feeling faint or lightheaded, falls  · feeling agitated, angry, or irritable  · hallucination, loss of contact with reality  · loss of balance or coordination  · loss of memory  · painful or prolonged erections  · restlessness, pacing, inability to keep still  · seizures  · stiff muscles  · suicidal thoughts or other mood changes  · trouble sleeping  · unusual bleeding or bruising  · unusually weak or tired  · vomiting  Side effects that usually do not require medical attention (report to your doctor or health care professional if they  continue or are bothersome):  · changes in appetite  · change in sex drive or performance  · headache  · increased sweating  · indigestion, nausea  · tremors  This list may not describe all possible side effects. Call your doctor for medical advice about side effects. You may report side effects to FDA at 2-412-JGT-1841.  Where should I keep my medicine?  Keep out of reach of children.  Store at room temperature between 15 and 30 degrees C (59 and 86 degrees F). Throw away any unused medicine after the expiration date.  NOTE: This sheet is a summary. It may not cover all possible information. If you have questions about this medicine, talk to your doctor, pharmacist, or health care provider.  © 2020 [employer] (2019-12-09 11:21:44)

## 2020-10-13 ENCOUNTER — OFFICE VISIT (OUTPATIENT)
Dept: NEUROLOGY | Facility: MEDICAL CENTER | Age: 65
End: 2020-10-13
Payer: COMMERCIAL

## 2020-10-13 VITALS
HEART RATE: 81 BPM | SYSTOLIC BLOOD PRESSURE: 140 MMHG | OXYGEN SATURATION: 97 % | HEIGHT: 64 IN | BODY MASS INDEX: 34.63 KG/M2 | TEMPERATURE: 98.4 F | DIASTOLIC BLOOD PRESSURE: 76 MMHG | RESPIRATION RATE: 14 BRPM | WEIGHT: 202.82 LBS

## 2020-10-13 DIAGNOSIS — R41.3 MEMORY CHANGE: ICD-10-CM

## 2020-10-13 DIAGNOSIS — G43.009 MIGRAINE WITHOUT AURA AND WITHOUT STATUS MIGRAINOSUS, NOT INTRACTABLE: ICD-10-CM

## 2020-10-13 DIAGNOSIS — R40.4 EPISODIC ALTERED AWARENESS: ICD-10-CM

## 2020-10-13 PROCEDURE — 99204 OFFICE O/P NEW MOD 45 MIN: CPT | Performed by: NURSE PRACTITIONER

## 2020-10-13 RX ORDER — RIZATRIPTAN BENZOATE 10 MG/1
TABLET ORAL
Qty: 9 TAB | Refills: 11 | Status: SHIPPED | OUTPATIENT
Start: 2020-10-13 | End: 2022-11-26

## 2020-10-13 ASSESSMENT — ENCOUNTER SYMPTOMS
FEVER: 0
HEADACHES: 1
BACK PAIN: 1
NERVOUS/ANXIOUS: 0
DIARRHEA: 0
DEPRESSION: 1
VOMITING: 0
PHOTOPHOBIA: 1
CONSTIPATION: 0
BLURRED VISION: 1
SHORTNESS OF BREATH: 0
INSOMNIA: 1
CHILLS: 0
DIZZINESS: 1
MYALGIAS: 1
PALPITATIONS: 0
HEARTBURN: 0
SPEECH CHANGE: 1
NECK PAIN: 1
MEMORY LOSS: 1
TINGLING: 1
DOUBLE VISION: 0
NAUSEA: 1
COUGH: 1
BRUISES/BLEEDS EASILY: 1

## 2020-10-13 ASSESSMENT — FIBROSIS 4 INDEX: FIB4 SCORE: 1.09

## 2020-10-13 NOTE — PATIENT INSTRUCTIONS
Start Rizatriptan 10mg Take 1/2  tablet by mouth  at onset of headache, may repeat dose in 2 hours if unrelieved.  Do not exceed more than 1 tablets in 24 hours.    Start Ajoviy 225mg subcutanously every 4 weeks      Start magnesium oxide 400mg gel cap (Nature made makes a gel cap) by mouth every night, may take extra dose if needed for headache (over the counter), hold for diarrhea         Start Riboflavin (Vitamin B2) 400mg by mouth every night (over the counter),may turn urine bright yellow         Start COQ 10, take 300mg every night. (over the counter)          Attempt to go to bed and get up at the same time every night           Eat meals on regular basis            Stay hydrated.             Aerobic exercise 30 minutes daily             Avoid aged or smoked foods, avoid processed foods, red wine, aged cheese              Keep headache diary, include foods that you may have eaten.             Avoid overusing over the counter medications:  Do not take more than 500mg acetaminophen (tylenol), more than 4 times weekly, more frequent or larger doses are associated with medication overuse headache.            Migraine Headache  A migraine headache is a very strong throbbing pain on one side or both sides of your head. This type of headache can also cause other symptoms. It can last from 4 hours to 3 days. Talk with your doctor about what things may bring on (trigger) this condition.  What are the causes?  The exact cause of this condition is not known. This condition may be triggered or caused by:  · Drinking alcohol.  · Smoking.  · Taking medicines, such as:  ? Medicine used to treat chest pain (nitroglycerin).  ? Birth control pills.  ? Estrogen.  ? Some blood pressure medicines.  · Eating or drinking certain products.  · Doing physical activity.  Other things that may trigger a migraine headache include:  · Having a menstrual period.  · Pregnancy.  · Hunger.  · Stress.  · Not getting enough sleep or getting  too much sleep.  · Weather changes.  · Tiredness (fatigue).  What increases the risk?  · Being 25-55 years old.  · Being female.  · Having a family history of migraine headaches.  · Being .  · Having depression or anxiety.  · Being very overweight.  What are the signs or symptoms?  · A throbbing pain. This pain may:  ? Happen in any area of the head, such as on one side or both sides.  ? Make it hard to do daily activities.  ? Get worse with physical activity.  ? Get worse around bright lights or loud noises.  · Other symptoms may include:  ? Feeling sick to your stomach (nauseous).  ? Vomiting.  ? Dizziness.  ? Being sensitive to bright lights, loud noises, or smells.  · Before you get a migraine headache, you may get warning signs (an aura). An aura may include:  ? Seeing flashing lights or having blind spots.  ? Seeing bright spots, halos, or zigzag lines.  ? Having tunnel vision or blurred vision.  ? Having numbness or a tingling feeling.  ? Having trouble talking.  ? Having weak muscles.  · Some people have symptoms after a migraine headache (postdromal phase), such as:  ? Tiredness.  ? Trouble thinking (concentrating).  How is this treated?  · Taking medicines that:  ? Relieve pain.  ? Relieve the feeling of being sick to your stomach.  ? Prevent migraine headaches.  · Treatment may also include:  ? Having acupuncture.  ? Avoiding foods that bring on migraine headaches.  ? Learning ways to control your body functions (biofeedback).  ? Therapy to help you know and deal with negative thoughts (cognitive behavioral therapy).  Follow these instructions at home:  Medicines  · Take over-the-counter and prescription medicines only as told by your doctor.  · Ask your doctor if the medicine prescribed to you:  ? Requires you to avoid driving or using heavy machinery.  ? Can cause trouble pooping (constipation). You may need to take these steps to prevent or treat trouble pooping:  § Drink enough fluid to keep  your pee (urine) pale yellow.  § Take over-the-counter or prescription medicines.  § Eat foods that are high in fiber. These include beans, whole grains, and fresh fruits and vegetables.  § Limit foods that are high in fat and sugar. These include fried or sweet foods.  Lifestyle  · Do not drink alcohol.  · Do not use any products that contain nicotine or tobacco, such as cigarettes, e-cigarettes, and chewing tobacco. If you need help quitting, ask your doctor.  · Get at least 8 hours of sleep every night.  · Limit and deal with stress.  General instructions         · Keep a journal to find out what may bring on your migraine headaches. For example, write down:  ? What you eat and drink.  ? How much sleep you get.  ? Any change in what you eat or drink.  ? Any change in your medicines.  · If you have a migraine headache:  ? Avoid things that make your symptoms worse, such as bright lights.  ? It may help to lie down in a dark, quiet room.  ? Do not drive or use heavy machinery.  ? Ask your doctor what activities are safe for you.  · Keep all follow-up visits as told by your doctor. This is important.  Contact a doctor if:  · You get a migraine headache that is different or worse than others you have had.  · You have more than 15 headache days in one month.  Get help right away if:  · Your migraine headache gets very bad.  · Your migraine headache lasts longer than 72 hours.  · You have a fever.  · You have a stiff neck.  · You have trouble seeing.  · Your muscles feel weak or like you cannot control them.  · You start to lose your balance a lot.  · You start to have trouble walking.  · You pass out (faint).  · You have a seizure.  Summary  · A migraine headache is a very strong throbbing pain on one side or both sides of your head. These headaches can also cause other symptoms.  · This condition may be treated with medicines and changes to your lifestyle.  · Keep a journal to find out what may bring on your migraine  headaches.  · Contact a doctor if you get a migraine headache that is different or worse than others you have had.  · Contact your doctor if you have more than 15 headache days in a month.  This information is not intended to replace advice given to you by your health care provider. Make sure you discuss any questions you have with your health care provider.  Document Released: 09/26/2009 Document Revised: 04/10/2020 Document Reviewed: 01/30/2020  [employer] Patient Education © 2020 [employer]

## 2020-10-13 NOTE — PROGRESS NOTES
Subjective:    GERARDO English is a 65 y.o. female who presents with chronic migraine and memory changes.  She recently had a MRI and would like to review.    She was referred by Dr. Silvia Carrasco    She was on Cymbalta for back pain and noticed that she started having memory problems, early in .  She would have periods where she loses time, the first episode was in 2020.  It is always that she doesn't remember the night before.   It doesn't really happen during the daytime but she has to stay a lot more focused than she used to .  It was a little less dramatic when she stopped Cymbalta a few weeks ago.  The last time this occurred was this past , she had no idea what day it was when she woke up.    She started noticing that she was having difficulty finding the right words in the past 6 months.  She notices this daily,  It started before she took the cymbalta, it started about 6 months ago.  She feels like she has to work harder to have conversations.  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  She also gets a pin stabbing pain in the right eye with some of them, this is a new symptom.   The migraines almost completely went away when she stopped eating gluten 5 years ago and they came back about 1 year ago.    She had a baby at age 37, her last menstrual period was at age 43, they stopped suddenly.   However, she has a history of endometriosis and still had problems after she stopped having menses.    PMH includes hypothyroidism, mild depression, peptic ulcer disease, HSV infection, lumbar radiculopathy, asthma, chronic asthma    Family Hx:  Father's side - heart disease, brother-HTN, mother- Lupus, arthritis, migraines. Paternal grandmother probably had dementia but she was in her 90s when she .  Maternal grand-mother was found several times wandering in her life (she committed  suicide in her 60s)    Social Hx:  Smoked a little in college, 2 glasses of wine 4-5 nights per week, denies drug use.  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       Age at Onset:  40  Triggers:  Gluten, red plums,   Alleviating factors:  Sumatriptan sometimes works,   Meds tried and result:  Sumatriptan is the only medication she ever tried.  Hormones:   none  Caffeine use:  2 cups of coffee daily  OTC medications--frequency:  Aleve 3 times weekly, rare tylenol.  Smoking: Remote smoking (2 cigs daily in college)  Alcohol use:  2 glasses of wine 4-5 days per week  Sleep apnea:  Witnessed snoring, no witnessed apneas.  Family Hx:  Mother -migraine  How many days per month: 30/30, about 16 of those days were migraines.  Quality:  Start in the left occipital area and comes up to the top of the head, the pain feels like a vise , pounding, pain as high as 7/10l  Has brain fog the next day.    Went to the ER in September 2020 for 28 hour migraine.   No other ER or urgent care visits in the past year.  N&V:   Rare vomiting, nausea +  Photo/phonophobia:  light>sound  Aura:  none       MIDAS     Migraine Disability Assessment Test  Migraines are intense headaches that can make it difficult to work, take care of household chores or even spend time with friends and family. The MIDAS (migraine disability assessment) questionnaire can help your doctor assess the impact your headaches are causing on your life and determine the best treatment option for you.  Answer the following questions about the headaches of any kind you've experienced over the past three months. Use zero for questions where you have not experienced any activity disruption during the past three months.  1. How many days have you missed work or school because of a headache? ____1___  2. Not including the days from question one, how many days have you lost productivity by at least half at school or work? ______12_  3. How many days have you  skipped performing household chores or regular household activities because of a headache? ___10____  4. Not including the days from question two, how many days was your productivity in performing household chores reduced by at least half? ____12___  5. How many days did you miss leisure or social activities because of your headaches? __5_____   What is your total score? ____40___       MIDAS Score                                         MIDAS Grade   0-5   Little or No Disability                      I  6-10   Mild Disability                               II  11-20  Moderate Disability                     III  21+     Severe Disability                          IV  Additional migraine questions  The frequency and intensity of your migraines are important for your doctor to know when prescribing a treatment plan. Over the past three months:   • How many days have you had a headache? Note that if a headache lasted more than one day, count each day.   • On average, how painful were the headaches? Use a scale of 0-10with 0 being no pain and 10 being the most painful.      Review of Systems   Constitutional: Negative for chills and fever.   HENT: Negative for hearing loss and tinnitus.    Eyes: Positive for blurred vision and photophobia. Negative for double vision.   Respiratory: Positive for cough. Negative for shortness of breath.    Cardiovascular: Positive for chest pain. Negative for palpitations.        Feels muscular chest pain at times   Gastrointestinal: Positive for nausea. Negative for constipation, diarrhea, heartburn and vomiting.   Genitourinary: Negative for dysuria.   Musculoskeletal: Positive for back pain, myalgias and neck pain.   Skin: Negative for rash.   Neurological: Positive for dizziness, tingling, speech change and headaches.        CTS both hands    Has trouble finding correct words at times.   Endo/Heme/Allergies: Bruises/bleeds easily.   Psychiatric/Behavioral: Positive for depression and  memory loss. The patient has insomnia. The patient is not nervous/anxious.         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 Max 2 in 24 hr 9 Tab 11   • escitalopram (LEXAPRO) 10 MG Tab Take 1 Tab by mouth every day. 30 Tab 3   • SYNTHROID 150 MCG Tab TAKE ONE TABLET BY MOUTH EVERY MORNING ON AN EMPTY STOMACH 90 Tab 2   • albuterol 108 (90 Base) MCG/ACT Aero Soln inhalation aerosol Inhale 2 Puffs by mouth every four hours as needed for Shortness of Breath (cough and wheezing). 1 Inhaler 3   • acyclovir (ZOVIRAX) 400 MG tablet TAKE ONE TABLET BY MOUTH THREE TIMES A DAY 30 Tab 3   • SYNTHROID 137 MCG Tab Take 1 Tab by mouth Every morning on an empty stomach. 90 Tab 3   • Melatonin 10 MG Tab Take  by mouth.     • omeprazole (PRILOSEC) 40 MG delayed-release capsule Take 1 Cap by mouth every day. 30 Cap 2     No current facility-administered medications on file prior to visit.         Objective:   I personally reviewed imaging below and agree with findings:  MRI brain 9/9/2020  1.  No acute hemorrhage, ischemia or mass  2.  Mild-to-moderate atrophy in a nonspecific pattern  3.  Mild white matter changes  Encounter Vitals  Standard  "Vitals  Vitals  Blood Pressure: 140/76  Temperature: 36.9 °C (98.4 °F)  Temp src: Temporal  Pulse: 81  Respiration: 14  Pulse Oximetry: 97 %  Height: 162.6 cm (5' 4\")  Weight: 92 kg (202 lb 13.2 oz)  Encounter Vitals  Temperature: 36.9 °C (98.4 °F)  Temp src: Temporal  Blood Pressure: 140/76  Pulse: 81  Respiration: 14  Pulse Oximetry: 97 %  Weight: 92 kg (202 lb 13.2 oz)  Height: 162.6 cm (5' 4\")  BMI (Calculated): 34.81      Physical Exam      Constitutional:  Alert, no apparent distress,  Psych:   mood and affect WNL  Neuro:  Oriented X 4, speech fluent, naming and memory intact  Muskuloskeletal:  Moves all extremities equally, strength 5/5 bilaterally  CN II: Visual fields are full to confrontation. Fundoscopic exam is normal with sharp discs and no vascular changes. Pupils are 3 mm and briskly reactive to light.   CN III, IV, VI  EOMs intact, no ptosis  CN V: Facial sensation is intact to pinprick in all 3 divisions bilaterally. Corneal responses are intact.  CN VII: Face is symmetric with normal eye closure and smile.  CN VII: Hearing is normal to rubbing fingers  CN IX, X: Palate elevates symmetrically. Phonation is normal.  CN XI: Head turning and shoulder shrug are intact  CN XII: Tongue is midline with normal movements and no atrophy.              Strength 5/5 BUE/BLE, no drift                 Sensation to PP equal bilaterally                 No limb ataxia with finger to nose and heel to shin                 Ambulates with steady gait.                 Rhomberg negative                Biceps,brachioradialis, tricep, patellar and ankle reflex all 2+     Cardiovascular:    S1S2, no abnormal rhythm auscultated, no peripheral edema  Neck:                     No carotid bruits noted   Pulmonary:            Respirations easy, lungs clear to auscultation all fields.     Skin:                     No obvious rashes.           Assessment/Plan:       1. Migraine without aura and without status migrainosus, not " intractable    Stop Imitrex and start rizatriptan 10mg once daily as needed.      Take 1/2 tablet po at onset of headache, may repeat dose in 2 hours if unrelieved.  Do not exceed more than 2 tablets in 24 hours.    Cannot use beta blocker due to ashtma    Cannot use SSRI/SNRI/TCA as she is already on SSRI and sumatriptan    Would avoid topamax due to current problems with word finding.    Would also avoid zonisamide due to loss of memory at night.    Ajovy 225mg every 4 weeks, coupon given.     Start magnesium oxide 400mg by mouth every night, may take extra dose if needed for headache (over the counter), hold for diarrhea         Start Riboflavin (Vitamin B2) 400mg by mouth every night (over the counter),may turn urine bright yellow         Start COQ 10, take 300mg every night. (over the counter)          Attempt to go to bed and get up at the same time every night           Eat meals on regular basis            Stay hydrated.             Aerobic exercise 30 minutes daily             Avoid aged or smoked foods, avoid processed foods, red wine, aged cheese              Keep headache diary, include foods that you may have eaten.             Avoid overusing over the counter medications:  Do not take more than 500mg acetaminophen (tylenol), more than 4 times weekly, more frequent or larger doses are associated with medication overuse headache.      2. Episodic altered awareness, I am concerned that these episodes of altered awareness may represent seizure.    72 hour EEG    I counseled patient on migraine triggers, lifestyle changes, medication overuse, supplements and medication side effects.      3. Memory change    She is having frequent problems with word finding, there is some evidence of mild -moderate atrophy on the MRI which could contribute;   However, chronic pain and depression may also be contributors.    B12 level    Recommend diet and exercise modifications.    Follow up  In 3 months.

## 2020-12-11 ENCOUNTER — HOSPITAL ENCOUNTER (OUTPATIENT)
Dept: LAB | Facility: MEDICAL CENTER | Age: 65
End: 2020-12-11
Attending: SPECIALIST
Payer: COMMERCIAL

## 2020-12-11 LAB
BASOPHILS # BLD AUTO: 0.6 % (ref 0–1.8)
BASOPHILS # BLD: 0.04 K/UL (ref 0–0.12)
EOSINOPHIL # BLD AUTO: 0.3 K/UL (ref 0–0.51)
EOSINOPHIL NFR BLD: 4.4 % (ref 0–6.9)
ERYTHROCYTE [DISTWIDTH] IN BLOOD BY AUTOMATED COUNT: 42.4 FL (ref 35.9–50)
HCT VFR BLD AUTO: 41.4 % (ref 37–47)
HGB BLD-MCNC: 13.4 G/DL (ref 12–16)
IMM GRANULOCYTES # BLD AUTO: 0.02 K/UL (ref 0–0.11)
IMM GRANULOCYTES NFR BLD AUTO: 0.3 % (ref 0–0.9)
LYMPHOCYTES # BLD AUTO: 1.26 K/UL (ref 1–4.8)
LYMPHOCYTES NFR BLD: 18.5 % (ref 22–41)
MCH RBC QN AUTO: 30.3 PG (ref 27–33)
MCHC RBC AUTO-ENTMCNC: 32.4 G/DL (ref 33.6–35)
MCV RBC AUTO: 93.7 FL (ref 81.4–97.8)
MONOCYTES # BLD AUTO: 0.48 K/UL (ref 0–0.85)
MONOCYTES NFR BLD AUTO: 7.1 % (ref 0–13.4)
NEUTROPHILS # BLD AUTO: 4.7 K/UL (ref 2–7.15)
NEUTROPHILS NFR BLD: 69.1 % (ref 44–72)
NRBC # BLD AUTO: 0 K/UL
NRBC BLD-RTO: 0 /100 WBC
PLATELET # BLD AUTO: 330 K/UL (ref 164–446)
PMV BLD AUTO: 10.3 FL (ref 9–12.9)
RBC # BLD AUTO: 4.42 M/UL (ref 4.2–5.4)
WBC # BLD AUTO: 6.8 K/UL (ref 4.8–10.8)

## 2020-12-11 PROCEDURE — 36415 COLL VENOUS BLD VENIPUNCTURE: CPT

## 2020-12-11 PROCEDURE — 85025 COMPLETE CBC W/AUTO DIFF WBC: CPT

## 2021-01-04 ENCOUNTER — NON-PROVIDER VISIT (OUTPATIENT)
Dept: NEUROLOGY | Facility: MEDICAL CENTER | Age: 66
End: 2021-01-04
Attending: NURSE PRACTITIONER
Payer: COMMERCIAL

## 2021-01-04 DIAGNOSIS — R40.4 EPISODIC ALTERED AWARENESS: ICD-10-CM

## 2021-01-04 PROCEDURE — 95719 EEG PHYS/QHP EA INCR W/O VID: CPT | Performed by: STUDENT IN AN ORGANIZED HEALTH CARE EDUCATION/TRAINING PROGRAM

## 2021-01-04 PROCEDURE — 95708 EEG WO VID EA 12-26HR UNMNTR: CPT | Performed by: STUDENT IN AN ORGANIZED HEALTH CARE EDUCATION/TRAINING PROGRAM

## 2021-01-04 PROCEDURE — 95700 EEG CONT REC W/VID EEG TECH: CPT | Performed by: STUDENT IN AN ORGANIZED HEALTH CARE EDUCATION/TRAINING PROGRAM

## 2021-01-04 NOTE — PROCEDURES
AMBULATORY ELECTROENCEPHALOGRAM REPORT      Referring provider:   CARMENZA Gibbons  [address] [facility] [place] [id number]      DOS:   1/5/2021  Duration: 23 hours and 20 minutes of total recording time      INDICATION:  Corrinne Lynne Cibulsky 65 y.o. female presenting with altered mental status    CURRENT ANTIEPILEPTIC REGIMEN: No AEDs     TECHNIQUE: A 30-channel ambulatory electroencephalogram (aEEG) was performed in accordance with the international 10-20 system. This digital study was reviewed in bipolar and referential montages. The recording examined the patient during wakeful, drowsy and sleep states.     DESCRIPTION OF THE RECORD:  During the awake state, background shows symmetrical 9-10 Hz alpha activity posteriorly with amplitude of 70 mV.  There was reactivity with eye opening/closure.  Normal anterior-posterior gradient was noted with faster beta frequencies seen anteriorly.  During drowsiness, high-amplitude delta frequencies were seen.    During the sleep state, background shows diffuse high-amplitude 4-5 Hz delta activity.  Symmetrical high-amplitude sleep spindles and vertex sharp activities were seen in the central leads.    ACTIVATION PROCEDURES:   Hyperventilation was performed by the patient for a total of 3 minutes. The technician performing the test noted good effort. This technique produced electroencephalographic changes in keeping with the expected bilaterally synchronous, frontally predominant, high amplitude slow waves build up.     Intermittent Photic stimulation was performed in a stepwise fashion from 1 to 30 Hz and elicited a normal response (photic driving), most noticeable in the posterior leads.    ICTAL AND INTERICTAL FINDINGS:   No focal or generalized epileptiform activity noted.     No regional slowing was seen during this routine study.      No clinical events or seizures were reported or recorded during the study.     EKG: sampling of the EKG recording did not  demonstrate any abnormalities    EVENTS:     Push-button events and corresponding ambulatory diary entries are listed below:    d1 01:12:50 PM  Patient Event  d1 01:13:12 PM  Patient Event - Exhaustion/lack of focus per ambulatory diary  d1 01:35:51 PM  Patient Event - Spasm, neck to left face per ambulatory diary  d1 03:12:02 PM  Patient Event   d1 06:18:35 PM  Patient Event - Headache per ambulatory diary  d2 09:46:00 AM  Patient Event    There was no abnormal EEG correlate to any clinic events.    INTERPRETATION:  This is a normal ambulatory EEG recording in the awake and drowsy/sleep state(s). There was no abnormal EEG correlate to any clinic events of headache, neck/face spasm, or episode of exhaustion/lack of focus.  Clinical correlation is recommended.          Anselmo Lundy MD  Epilepsy and General Neurology  Department of Neurology  Clinical  of Neurology [facility].

## 2021-01-05 ENCOUNTER — NON-PROVIDER VISIT (OUTPATIENT)
Dept: NEUROLOGY | Facility: MEDICAL CENTER | Age: 66
End: 2021-01-05
Attending: NURSE PRACTITIONER
Payer: COMMERCIAL

## 2021-01-05 DIAGNOSIS — R40.4 EPISODIC ALTERED AWARENESS: ICD-10-CM

## 2021-01-05 PROCEDURE — 95708 EEG WO VID EA 12-26HR UNMNTR: CPT | Performed by: STUDENT IN AN ORGANIZED HEALTH CARE EDUCATION/TRAINING PROGRAM

## 2021-01-05 PROCEDURE — 95719 EEG PHYS/QHP EA INCR W/O VID: CPT | Performed by: STUDENT IN AN ORGANIZED HEALTH CARE EDUCATION/TRAINING PROGRAM

## 2021-01-05 NOTE — PROCEDURES
AMBULATORY ELECTROENCEPHALOGRAM REPORT      Referring provider:   CARMENZA Gibbons  [address] [facility] [place] [address]      DOS:   1/5/2021  Duration of Recording: (23 hours and 49 minutes)      INDICATION:  Corrinne Lynne Cibulsky 65 y.o. female presenting with altered mental status    CURRENT OUTPATIENT MEDICATION LIST:  Current Outpatient Medications   Medication Instructions   • acyclovir (ZOVIRAX) 400 MG tablet TAKE ONE TABLET BY MOUTH THREE TIMES A DAY   • albuterol 108 (90 Base) MCG/ACT Aero Soln inhalation aerosol 2 Puffs, Inhalation, EVERY 4 HOURS PRN   • escitalopram (LEXAPRO) 10 mg, Oral, DAILY   • Fremanezumab-vfrm 225 mg, Subcutaneous, Every 4 Weeks   • Melatonin 10 MG Tab Oral   • omeprazole (PRILOSEC) 40 mg, Oral, DAILY   • rizatriptan (MAXALT) 10 MG tablet Take 1/2  tablet by mouth at onset of headache, may repeat dose in 2 hours if unrelieved.  Do not exceed more than 1 tablets in 24 hours.   • sumatriptan (IMITREX) 100 mg, Oral, ONCE PRN, Max 2 in 24 hr   • Synthroid 137 mcg, Oral, EACH MORNING ON EMPTY STOMACH         TECHNIQUE: A 30-channel ambulatory electroencephalogram (aEEG) was performed in accordance with the international 10-20 system. This digital study was reviewed in bipolar and referential montages. The recording examined the patient during wakeful, drowsy and sleep states.     DESCRIPTION OF THE RECORD:  During the awake state, background shows symmetrical 9-10 Hz alpha activity posteriorly with amplitude of 70 mV.  There was reactivity with eye opening/closure.  Normal anterior-posterior gradient was noted with faster beta frequencies seen anteriorly.  During drowsiness, high-amplitude delta frequencies were seen.    During the sleep state, background shows diffuse high-amplitude 4-5 Hz delta activity.  Symmetrical high-amplitude sleep spindles and vertex sharp activities were seen in the central leads.    ACTIVATION PROCEDURES:   Hyperventilation was performed by  the patient for a total of 3 minutes. The technician performing the test noted good effort. This technique produced electroencephalographic changes in keeping with the expected bilaterally synchronous, frontally predominant, high amplitude slow waves build up.     Intermittent Photic stimulation was performed in a stepwise fashion from 1 to 30 Hz and elicited a normal response (photic driving), most noticeable in the posterior leads.    ICTAL AND INTERICTAL FINDINGS:   No focal or generalized epileptiform activity noted.     No regional slowing was seen during this routine study.      No clinical events or seizures were reported or recorded during the study.     EKG: sampling of the EKG recording did not demonstrate any abnormalities    EVENTS:      Multiple push button events and/or ambulatory diary events noted at the following times below:    d1 05:08:52 PM  Patient Event  d1 05:20:26 PM  Patient Event - asthma episode  d1 06:14:38 PM  Patient Event - asthma episode. And cough ad head pain  d1 06:55:21 PM  Patient Event - dizzy episode  d2 08:53:17 AM  Patient Event    There was no abnormal EEG correlate to any of the clinical events noted above.     INTERPRETATION:  This is a normal ambulatory EEG recording in the awake and drowsy/sleep state(s). There was no abnormal EEG correlate to any of the clinical events noted above.  Clinical correlation is recommended.           Anselmo Lundy MD  Epilepsy and General Neurology  Department of Neurology  Clinical  of Neurology [facility].

## 2021-01-06 ENCOUNTER — NON-PROVIDER VISIT (OUTPATIENT)
Dept: NEUROLOGY | Facility: MEDICAL CENTER | Age: 66
End: 2021-01-06
Attending: NURSE PRACTITIONER
Payer: COMMERCIAL

## 2021-01-06 DIAGNOSIS — R40.4 EPISODIC ALTERED AWARENESS: ICD-10-CM

## 2021-01-06 PROCEDURE — 99999 PR NO CHARGE: CPT | Performed by: STUDENT IN AN ORGANIZED HEALTH CARE EDUCATION/TRAINING PROGRAM

## 2021-01-07 ENCOUNTER — NON-PROVIDER VISIT (OUTPATIENT)
Dept: NEUROLOGY | Facility: MEDICAL CENTER | Age: 66
End: 2021-01-07
Attending: NURSE PRACTITIONER
Payer: COMMERCIAL

## 2021-01-07 DIAGNOSIS — R40.4 EPISODIC ALTERED AWARENESS: ICD-10-CM

## 2021-01-07 PROCEDURE — 95708 EEG WO VID EA 12-26HR UNMNTR: CPT | Performed by: STUDENT IN AN ORGANIZED HEALTH CARE EDUCATION/TRAINING PROGRAM

## 2021-01-07 PROCEDURE — 95719 EEG PHYS/QHP EA INCR W/O VID: CPT | Performed by: STUDENT IN AN ORGANIZED HEALTH CARE EDUCATION/TRAINING PROGRAM

## 2021-01-07 NOTE — PROCEDURES
AMBULATORY ELECTROENCEPHALOGRAM REPORT      Referring provider:   CARMENZA Gibbons  [address] [address]  [place] [phone]      DOS:   1/7/2021  Duration of Recording: (21 hours and 04 minutes)      INDICATION:  Corrinne Lynne Cibulsky 65 y.o. female presenting with altered mental status    CURRENT OUTPATIENT MEDICATION LIST:  Current Outpatient Medications   Medication Instructions   • acyclovir (ZOVIRAX) 400 MG tablet TAKE ONE TABLET BY MOUTH THREE TIMES A DAY   • albuterol 108 (90 Base) MCG/ACT Aero Soln inhalation aerosol 2 Puffs, Inhalation, EVERY 4 HOURS PRN   • escitalopram (LEXAPRO) 10 mg, Oral, DAILY   • Fremanezumab-vfrm 225 mg, Subcutaneous, Every 4 Weeks   • Melatonin 10 MG Tab Oral   • omeprazole (PRILOSEC) 40 mg, Oral, DAILY   • rizatriptan (MAXALT) 10 MG tablet Take 1/2  tablet by mouth at onset of headache, may repeat dose in 2 hours if unrelieved.  Do not exceed more than 1 tablets in 24 hours.   • sumatriptan (IMITREX) 100 mg, Oral, ONCE PRN, Max 2 in 24 hr   • Synthroid 137 mcg, Oral, EACH MORNING ON EMPTY STOMACH         TECHNIQUE: A 30-channel ambulatory electroencephalogram (aEEG) was performed in accordance with the international 10-20 system. This digital study was reviewed in bipolar and referential montages. The recording examined the patient during wakeful, drowsy and sleep states.     DESCRIPTION OF THE RECORD:  During the awake state, background shows symmetrical 9-10 Hz alpha activity posteriorly with amplitude of 70 mV.  There was reactivity with eye opening/closure.  Normal anterior-posterior gradient was noted with faster beta frequencies seen anteriorly.  During drowsiness, high-amplitude delta frequencies were seen.    During the sleep state, background shows diffuse high-amplitude 4-5 Hz delta activity.  Symmetrical high-amplitude sleep spindles and vertex sharp activities were seen in the central leads.    ACTIVATION PROCEDURES:   NA    ICTAL AND INTERICTAL  FINDINGS:   No focal or generalized epileptiform activity noted.     No regional slowing was seen during this routine study.      No clinical events or seizures were reported or recorded during the study.     EKG: sampling of the EKG recording did not demonstrate any abnormalities    EVENTS:      Multiple push button events and/or ambulatory diary events noted at the following times below:    d1 10:40:43 AM  Patient Event  d1 11:13:43 AM  Patient Event  d1 01:08:09 PM  Patient Event  d1 01:18:48 PM  Patient Event  d1 01:19:31 PM  Patient Event  d1 01:26:00 PM  Patient Event  d1 01:28:29 PM  Patient Event  d1 02:39:05 PM  Patient Event  d1 02:39:54 PM  Patient Event  d1 04:51:48 PM  Patient Event  d1 04:52:03 PM  Patient Event  d1 04:52:31 PM  Patient Event  d1 04:54:41 PM  Patient Event  d1 04:55:18 PM  Patient Event  d1 04:55:23 PM  Patient Event  d1 04:55:25 PM  Patient Event  d1 04:55:36 PM  Patient Event  d1 04:56:01 PM  Patient Event  d1 04:57:21 PM  Patient Event  d1 04:57:29 PM  Patient Event  d1 04:57:33 PM  Patient Event  d1 04:57:33 PM  Patient Event  d1 04:58:19 PM  Patient Event  d1 04:58:23 PM  Patient Event  d1 04:58:24 PM  Patient Event  d1 04:58:25 PM  Patient Event  d1 04:58:34 PM  Patient Event  d1 07:21:05 PM  Patient Event  d1 08:56:26 PM  Patient Event - Oral pain    There was no abnormal EEG correlate to any of the clinical events noted above.     INTERPRETATION:  This is a normal ambulatory EEG recording in the awake and drowsy/sleep state(s). There was no abnormal EEG correlate to any of the clinical events noted above.  Clinical correlation is recommended.           Anselmo Lundy MD  Epilepsy and General Neurology  Department of Neurology  Clinical  of Neurology [facility].

## 2021-01-08 ENCOUNTER — NON-PROVIDER VISIT (OUTPATIENT)
Dept: NEUROLOGY | Facility: MEDICAL CENTER | Age: 66
End: 2021-01-08
Attending: NURSE PRACTITIONER
Payer: COMMERCIAL

## 2021-01-11 ENCOUNTER — OFFICE VISIT (OUTPATIENT)
Dept: MEDICAL GROUP | Facility: LAB | Age: 66
End: 2021-01-11
Payer: COMMERCIAL

## 2021-01-11 VITALS
HEART RATE: 66 BPM | RESPIRATION RATE: 16 BRPM | DIASTOLIC BLOOD PRESSURE: 60 MMHG | WEIGHT: 182 LBS | HEIGHT: 64 IN | OXYGEN SATURATION: 100 % | SYSTOLIC BLOOD PRESSURE: 100 MMHG | BODY MASS INDEX: 31.07 KG/M2 | TEMPERATURE: 98.7 F

## 2021-01-11 DIAGNOSIS — R49.9 HOARSENESS OR CHANGING VOICE: ICD-10-CM

## 2021-01-11 DIAGNOSIS — Z23 NEED FOR VACCINATION: ICD-10-CM

## 2021-01-11 PROCEDURE — 90750 HZV VACC RECOMBINANT IM: CPT | Performed by: FAMILY MEDICINE

## 2021-01-11 PROCEDURE — 90471 IMMUNIZATION ADMIN: CPT | Performed by: FAMILY MEDICINE

## 2021-01-11 PROCEDURE — 99214 OFFICE O/P EST MOD 30 MIN: CPT | Mod: 25 | Performed by: FAMILY MEDICINE

## 2021-01-11 ASSESSMENT — FIBROSIS 4 INDEX: FIB4 SCORE: 0.89

## 2021-01-12 ENCOUNTER — OFFICE VISIT (OUTPATIENT)
Dept: NEUROLOGY | Facility: MEDICAL CENTER | Age: 66
End: 2021-01-12
Attending: NURSE PRACTITIONER
Payer: COMMERCIAL

## 2021-01-12 VITALS
TEMPERATURE: 97.8 F | OXYGEN SATURATION: 98 % | RESPIRATION RATE: 16 BRPM | BODY MASS INDEX: 31.2 KG/M2 | DIASTOLIC BLOOD PRESSURE: 70 MMHG | HEART RATE: 80 BPM | WEIGHT: 182.76 LBS | HEIGHT: 64 IN | SYSTOLIC BLOOD PRESSURE: 108 MMHG

## 2021-01-12 DIAGNOSIS — G43.009 MIGRAINE WITHOUT AURA AND WITHOUT STATUS MIGRAINOSUS, NOT INTRACTABLE: ICD-10-CM

## 2021-01-12 DIAGNOSIS — R41.3 MEMORY CHANGE: ICD-10-CM

## 2021-01-12 DIAGNOSIS — R40.4 EPISODIC ALTERED AWARENESS: ICD-10-CM

## 2021-01-12 PROCEDURE — 99202 OFFICE O/P NEW SF 15 MIN: CPT | Performed by: NURSE PRACTITIONER

## 2021-01-12 PROCEDURE — 99212 OFFICE O/P EST SF 10 MIN: CPT | Performed by: NURSE PRACTITIONER

## 2021-01-12 PROCEDURE — 99214 OFFICE O/P EST MOD 30 MIN: CPT | Performed by: NURSE PRACTITIONER

## 2021-01-12 ASSESSMENT — ENCOUNTER SYMPTOMS
VOMITING: 0
NECK PAIN: 1
DEPRESSION: 1
CHILLS: 0
BRUISES/BLEEDS EASILY: 1
HEARTBURN: 1
HEADACHES: 0
FEVER: 0
NAUSEA: 0
DIZZINESS: 1
BLURRED VISION: 1
PALPITATIONS: 0
MEMORY LOSS: 1
NERVOUS/ANXIOUS: 0
BACK PAIN: 0

## 2021-01-12 ASSESSMENT — FIBROSIS 4 INDEX: FIB4 SCORE: 0.89

## 2021-01-12 NOTE — PROGRESS NOTES
Subjective:      HPI     Mónica English is a 65 y.o. female who presents with chronic migraine and memory changes.  She is here today to review EEG results.  I last saw her on 10/13/2020.   She did not start the Ajovy after last visit, her pharmacy would not fill.        She was referred by Dr. Silvia Carrasco     She was on Cymbalta for back pain and noticed that she started having memory problems, early in 2020.  She would have periods where she loses time, the first episode was in August 2020.  It is always that she doesn't remember the night before.   It doesn't really happen during the daytime but she has to stay a lot more focused than she used to .  It was a little less dramatic when she stopped Cymbalta.  The last time this occurred was a little over a week ago.  She lost about 2 hours of time.   These episodes are occurring about twice per week, she doesn't know if she has a headache with them because she doesn't remember the details of these times.         She started noticing that she was having difficulty finding the right words in the past 6 months.  She notices this daily,  It started before she took the cymbalta, it started about 6 months ago.  She feels like she has to work harder to have conversations.  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  She also gets a pin stabbing pain in the right eye with some of them, this is a new symptom.   The migraines almost completely went away when she stopped eating gluten 5 years ago and they came back about 1 year ago.     She had a baby at age 37, her last menstrual period was at age 43, they stopped suddenly.   However, she has a history of endometriosis and still had problems after she stopped having menses.     PMH includes hypothyroidism, mild depression, peptic ulcer disease, HSV infection, lumbar radiculopathy, asthma, chronic asthma     Family Hx:  Father's  side - heart disease, brother-HTN, mother- Lupus, arthritis, migraines. Paternal grandmother probably had dementia but she was in her 90s when she .  Maternal grand-mother was found several times wandering in her life (she committed suicide in her 60s)     Social Hx:  Smoked a little in college, 2 glasses of wine 4-5 nights per week, denies drug use.  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        Age at Onset:  40  Triggers:  Gluten, red plums,   Alleviating factors:  Sumatriptan sometimes works,   Meds tried and result:    Rizatriptan works very well, sumatriptan worked somewhat,  Hormones:   none  Caffeine use:  2 cups of coffee daily  OTC medications--frequency:  Aleve 3 times weekly, rare tylenol.  Smoking: Remote smoking (2 cigs daily in college)  Alcohol use:  2 glasses of wine 4-5 days per week  Sleep apnea:  Witnessed snoring, no witnessed apneas.  Family Hx:  Mother -migraine  How many days per month: , about  8-10/month  of those days were migraines.  Quality:  Start in the left occipital area and comes up to the top of the head, the pain feels like a vise , pounding, pain as high as 7/10l  Has brain fog the next day.     Went to the ER in 2020 for 28 hour migraine.   No other ER or urgent care visits in the past year.  N&V:   Rare vomiting, nausea +  Photo/phonophobia:  light>sound  Aura:  none    Review of Systems   Constitutional: Negative for chills and fever.   HENT: Negative for hearing loss.    Eyes: Positive for blurred vision.   Cardiovascular: Negative for chest pain and palpitations.   Gastrointestinal: Positive for heartburn. Negative for nausea and vomiting.   Genitourinary: Negative for dysuria.   Musculoskeletal: Positive for neck pain. Negative for back pain.   Skin: Positive for rash.   Neurological: Positive for dizziness. Negative for headaches.   Endo/Heme/Allergies: Bruises/bleeds easily.   Psychiatric/Behavioral: Positive for depression and memory  loss. The patient is not nervous/anxious.       Past Medical History:   Diagnosis Date   • Anesthesia     wakes up manic   • Arthritis    • ASTHMA    • Asthma 7/11/2014   • Breath shortness     asthma related   • Chest pain 7/10/2014   • Endometriosis of vagina 9/19/2016    Overview:  AREA NOT SPECIFIED    • H/O migraine 7/11/2014   • HSV infection 9/19/2016   • Hyperlipidemia 9/19/2016   • Hyperthyroidism    • Knee osteoarthritis 9/12/2014   • Lymphocytic thyroiditis 9/19/2016   • Migraine without aura and without status migrainosus, not intractable 9/19/2016   • Psychiatric problem    • Renal colic 9/19/2016   • Renal disorder    • Rotator cuff tear 1/30/2014   • Vitamin D deficiency 9/19/2016     Current Outpatient Medications on File Prior to Visit   Medication Sig Dispense Refill   • rizatriptan (MAXALT) 10 MG tablet Take 1/2  tablet by mouth at onset of headache, may repeat dose in 2 hours if unrelieved.  Do not exceed more than 1 tablets in 24 hours. 9 Tab 11   • escitalopram (LEXAPRO) 10 MG Tab Take 1 Tab by mouth every day. 30 Tab 3   • albuterol 108 (90 Base) MCG/ACT Aero Soln inhalation aerosol Inhale 2 Puffs by mouth every four hours as needed for Shortness of Breath (cough and wheezing). 1 Inhaler 3   • acyclovir (ZOVIRAX) 400 MG tablet TAKE ONE TABLET BY MOUTH THREE TIMES A DAY 30 Tab 3   • SYNTHROID 137 MCG Tab Take 1 Tab by mouth Every morning on an empty stomach. 90 Tab 3   • Melatonin 10 MG Tab Take  by mouth.     • omeprazole (PRILOSEC) 40 MG delayed-release capsule Take 1 Cap by mouth every day. 30 Cap 2     No current facility-administered medications on file prior to visit.             Objective:   MRI brain 9/9/2020  1 No acute hemorrhage, ischemia or mass  2.  Mild-to-moderate atrophy in a nonspecific pattern  3.  Mild white matter changes      72  Hour EEG 1/2021 negative for abnormalities.    /70 (BP Location: Left arm, Patient Position: Sitting, BP Cuff Size: Adult long)   Pulse 80   " Temp 36.6 °C (97.8 °F) (Temporal)   Resp 16   Ht 1.626 m (5' 4\")   Wt 82.9 kg (182 lb 12.2 oz)   SpO2 98%   BMI 31.37 kg/m²      Physical Exam    Constitutional:  Alert, no apparent distress,  Psych:   mood and affect WNL  Neuro:  Oriented X 4, speech fluent, naming and memory intact  Muskuloskeletal:  Moves all extremities equally, strength 5/5 bilaterally, no drift  CN II: Visual fields are full to confrontation. Fundoscopic exam is normal with sharp discs and no vascular changes. Pupils are 4 mm and briskly reactive to light.   CN III, IV, VI  EOMs intact, no ptosis  CN V: Facial sensation is intact to LT in all 3 divisions bilaterally. Corneal responses are intact.  CN VII: Face is symmetric with normal eye closure and smile.  CN VII: Hearing is normal to rubbing fingers  CN IX, X: Palate elevates symmetrically. Phonation is normal.  CN XI: Head turning and shoulder shrug are intact  CN XII: Tongue is midline with normal movements and no atrophy.                           Sensation to light touch equal bilaterally                 No limb ataxia with finger to nose and heel to shin                 Ambulates with steady gait.                 Rhomberg negative                   Cardiovascular:    S1S2, no abnormal rhythm auscultated, no peripheral edema  Neck:                     No carotid bruits noted   Pulmonary:            Respirations easy, lungs clear to auscultation all fields.     Skin:                     No obvious rashes.         Assessment/Plan:      1. Migraine without aura and without status migrainosus, not intractable     Continue Rizatriptan 10mg for rescue  Take 1/2 tablet po at onset of headache, may repeat dose in 2 hours if unrelieved.  Do not exceed more than 2 tablets in 24 hours.     Cannot use beta blocker due to ashtma     Cannot use SSRI/SNRI/TCA as she is already on SSRI and sumatriptan     Would avoid topamax due to current problems with word finding.     Would also avoid " zonisamide due to loss of memory at night.     Ajovy 225mg every 4 weeks, coupon given.  We gave her another coupon today and sent to a different pharmacy, as her pharmacy would not fill.      magnesium oxide 400mg by mouth every night, may take extra dose if needed for headache (over the counter), hold for diarrhea          Riboflavin (Vitamin B2) 400mg by mouth every night (over the counter),may turn urine bright yellow        COQ 10, take 300mg every night. (over the counter)          Attempt to go to bed and get up at the same time every night           Eat meals on regular basis            Stay hydrated.             Aerobic exercise 30 minutes daily             Avoid aged or smoked foods, avoid processed foods, red wine, aged cheese              Keep headache diary, include foods that you may have eaten.             Avoid overusing over the counter medications:  Do not take more than 500mg acetaminophen (tylenol), more than 4 times weekly, more frequent or larger doses are associated with medication overuse headache.        2. Episodic altered awareness, I am concerned that these episodes of altered awareness may represent seizure.    72 hour EEG negative    I would not treat with AED at this time considering her memory problems.       I counseled patient on migraine triggers, lifestyle changes, medication overuse, supplements and medication side effects.        3. Memory change     She is having frequent problems with word finding, there is some evidence of mild -moderate atrophy on the MRI which could contribute;   However, chronic pain and depression may also be contributors.     B12 level, ordered at last visit,     Recommend diet and exercise modifications.     Follow up  In 6 months.    I counseled patient on migraine triggers, lifestyle changes, medication overuse, supplements and medication side effects.

## 2021-01-12 NOTE — PATIENT INSTRUCTIONS
Continue supplements.        Migraine Headache  A migraine headache is a very strong throbbing pain on one side or both sides of your head. This type of headache can also cause other symptoms. It can last from 4 hours to 3 days. Talk with your doctor about what things may bring on (trigger) this condition.  What are the causes?  The exact cause of this condition is not known. This condition may be triggered or caused by:  · Drinking alcohol.  · Smoking.  · Taking medicines, such as:  ? Medicine used to treat chest pain (nitroglycerin).  ? Birth control pills.  ? Estrogen.  ? Some blood pressure medicines.  · Eating or drinking certain products.  · Doing physical activity.  Other things that may trigger a migraine headache include:  · Having a menstrual period.  · Pregnancy.  · Hunger.  · Stress.  · Not getting enough sleep or getting too much sleep.  · Weather changes.  · Tiredness (fatigue).  What increases the risk?  · Being 25-55 years old.  · Being female.  · Having a family history of migraine headaches.  · Being .  · Having depression or anxiety.  · Being very overweight.  What are the signs or symptoms?  · A throbbing pain. This pain may:  ? Happen in any area of the head, such as on one side or both sides.  ? Make it hard to do daily activities.  ? Get worse with physical activity.  ? Get worse around bright lights or loud noises.  · Other symptoms may include:  ? Feeling sick to your stomach (nauseous).  ? Vomiting.  ? Dizziness.  ? Being sensitive to bright lights, loud noises, or smells.  · Before you get a migraine headache, you may get warning signs (an aura). An aura may include:  ? Seeing flashing lights or having blind spots.  ? Seeing bright spots, halos, or zigzag lines.  ? Having tunnel vision or blurred vision.  ? Having numbness or a tingling feeling.  ? Having trouble talking.  ? Having weak muscles.  · Some people have symptoms after a migraine headache (postdromal phase), such  as:  ? Tiredness.  ? Trouble thinking (concentrating).  How is this treated?  · Taking medicines that:  ? Relieve pain.  ? Relieve the feeling of being sick to your stomach.  ? Prevent migraine headaches.  · Treatment may also include:  ? Having acupuncture.  ? Avoiding foods that bring on migraine headaches.  ? Learning ways to control your body functions (biofeedback).  ? Therapy to help you know and deal with negative thoughts (cognitive behavioral therapy).  Follow these instructions at home:  Medicines  · Take over-the-counter and prescription medicines only as told by your doctor.  · Ask your doctor if the medicine prescribed to you:  ? Requires you to avoid driving or using heavy machinery.  ? Can cause trouble pooping (constipation). You may need to take these steps to prevent or treat trouble pooping:  § Drink enough fluid to keep your pee (urine) pale yellow.  § Take over-the-counter or prescription medicines.  § Eat foods that are high in fiber. These include beans, whole grains, and fresh fruits and vegetables.  § Limit foods that are high in fat and sugar. These include fried or sweet foods.  Lifestyle  · Do not drink alcohol.  · Do not use any products that contain nicotine or tobacco, such as cigarettes, e-cigarettes, and chewing tobacco. If you need help quitting, ask your doctor.  · Get at least 8 hours of sleep every night.  · Limit and deal with stress.  General instructions         · Keep a journal to find out what may bring on your migraine headaches. For example, write down:  ? What you eat and drink.  ? How much sleep you get.  ? Any change in what you eat or drink.  ? Any change in your medicines.  · If you have a migraine headache:  ? Avoid things that make your symptoms worse, such as bright lights.  ? It may help to lie down in a dark, quiet room.  ? Do not drive or use heavy machinery.  ? Ask your doctor what activities are safe for you.  · Keep all follow-up visits as told by your  doctor. This is important.  Contact a doctor if:  · You get a migraine headache that is different or worse than others you have had.  · You have more than 15 headache days in one month.  Get help right away if:  · Your migraine headache gets very bad.  · Your migraine headache lasts longer than 72 hours.  · You have a fever.  · You have a stiff neck.  · You have trouble seeing.  · Your muscles feel weak or like you cannot control them.  · You start to lose your balance a lot.  · You start to have trouble walking.  · You pass out (faint).  · You have a seizure.  Summary  · A migraine headache is a very strong throbbing pain on one side or both sides of your head. These headaches can also cause other symptoms.  · This condition may be treated with medicines and changes to your lifestyle.  · Keep a journal to find out what may bring on your migraine headaches.  · Contact a doctor if you get a migraine headache that is different or worse than others you have had.  · Contact your doctor if you have more than 15 headache days in a month.  This information is not intended to replace advice given to you by your health care provider. Make sure you discuss any questions you have with your health care provider.  Document Released: 09/26/2009 Document Revised: 04/10/2020 Document Reviewed: 01/30/2020  Elsevier Patient Education © 2020 [employer]

## 2021-01-17 DIAGNOSIS — F32.0 CURRENT MILD EPISODE OF MAJOR DEPRESSIVE DISORDER WITHOUT PRIOR EPISODE (HCC): ICD-10-CM

## 2021-01-18 RX ORDER — ESCITALOPRAM OXALATE 10 MG/1
TABLET ORAL
Qty: 30 TAB | Refills: 2 | Status: SHIPPED | OUTPATIENT
Start: 2021-01-18 | End: 2021-04-23 | Stop reason: SDUPTHER

## 2021-01-18 NOTE — TELEPHONE ENCOUNTER
Received request via: Pharmacy    Was the patient seen in the last year in this department? Yes    Does the patient have an active prescription (recently filled or refills available) for medication(s) requested? No     ESCITALOPRAM 10 MG TABLET

## 2021-01-19 NOTE — PROGRESS NOTES
Subjective:     Chief Complaint   Patient presents with   • Other     coughs when she sings   • Immunizations     shingrix       Corrinne Lynne Cibulsky is a 65 y.o. female here today for evaluation and management of:    Hoarseness or changing voice  Patient reports that she has had hoarseness for the last 3 to 6 months.  She reports that she coughs when she sings or talks for too long.  She denies any difficulty swallowing or food getting stuck.  No other URI symptoms.  She denies any increase in her GERD symptoms.  She is on omeprazole 40 mg daily.  She does have a history of a peptic ulcer.       Allergies   Allergen Reactions   • Gluten      GI upset   • Morphine Vomiting   • Pcn [Penicillins] Rash     Tolerates Ancef.   • Percocet [Oxycodone-Acetaminophen] Unspecified     migraine  migraine       Current medicines (including changes today)  Current Outpatient Medications   Medication Sig Dispense Refill   • escitalopram (LEXAPRO) 10 MG Tab TAKE ONE TABLET BY MOUTH DAILY (LEXAPRO) 30 Tab 2   • Fremanezumab-vfrm 225 MG/1.5ML Solution Auto-injector Inject 225 mg under the skin every 4 weeks. 1.68 mL 12   • rizatriptan (MAXALT) 10 MG tablet Take 1/2  tablet by mouth at onset of headache, may repeat dose in 2 hours if unrelieved.  Do not exceed more than 1 tablets in 24 hours. 9 Tab 11   • albuterol 108 (90 Base) MCG/ACT Aero Soln inhalation aerosol Inhale 2 Puffs by mouth every four hours as needed for Shortness of Breath (cough and wheezing). 1 Inhaler 3   • acyclovir (ZOVIRAX) 400 MG tablet TAKE ONE TABLET BY MOUTH THREE TIMES A DAY 30 Tab 3   • SYNTHROID 137 MCG Tab Take 1 Tab by mouth Every morning on an empty stomach. 90 Tab 3   • Melatonin 10 MG Tab Take  by mouth.     • omeprazole (PRILOSEC) 40 MG delayed-release capsule Take 1 Cap by mouth every day. 30 Cap 2     No current facility-administered medications for this visit.        She  has a past medical history of Anesthesia, Arthritis, ASTHMA, Asthma  (7/11/2014), Breath shortness, Chest pain (7/10/2014), Endometriosis of vagina (9/19/2016), H/O migraine (7/11/2014), HSV infection (9/19/2016), Hyperlipidemia (9/19/2016), Hyperthyroidism, Knee osteoarthritis (9/12/2014), Lymphocytic thyroiditis (9/19/2016), Migraine without aura and without status migrainosus, not intractable (9/19/2016), Psychiatric problem, Renal colic (9/19/2016), Renal disorder, Rotator cuff tear (1/30/2014), and Vitamin D deficiency (9/19/2016). She also has no past medical history of Arrhythmia, Bronchitis, Cancer (HCC), Cataract, Cold, Congestive heart failure (HCC), Coughing blood, Dental disorder, Glaucoma, Heart burn, Heart valve disease, Hepatitis A, Hepatitis B, Hepatitis C, High cholesterol, Hypertension, Indigestion, Myocardial infarct (HCC), Other and unspecified angina pectoris, Other emphysema (HCC), Pacemaker, Personal history of venous thrombosis and embolism, Pneumonia, Rheumatic fever, Seizure (HCC), Sleep apnea, Snoring, Tuberculosis, Type II or unspecified type diabetes mellitus without mention of complication, not stated as uncontrolled, Unspecified hemorrhagic conditions, or Unspecified urinary incontinence.    Patient Active Problem List    Diagnosis Date Noted   • Hoarseness or changing voice 01/11/2021   • Episodic altered awareness 10/13/2020   • Memory change 10/13/2020   • Current mild episode of major depressive disorder without prior episode (HCC) 08/27/2020   • Memory changes 08/27/2020   • Lumbar radiculopathy 05/26/2020   • Mild intermittent asthma without complication 05/14/2020   • Dense breast tissue on mammogram 05/14/2020   • Spondylolisthesis of lumbar region 01/07/2020   • Peptic ulcer 08/14/2019   • Epigastric pain 10/09/2018   • Obesity (BMI 30-39.9) 05/09/2017   • Endometriosis of vagina 09/19/2016   • Lymphocytic thyroiditis 09/19/2016   • Renal colic 09/19/2016   • Vitamin D deficiency 09/19/2016   • Dyslipidemia 09/19/2016   • Migraine without aura  "and without status migrainosus, not intractable 09/19/2016   • HSV infection 09/19/2016   • Knee osteoarthritis 09/12/2014   • Hypokalemia 07/11/2014   • S/P rotator cuff repair 02/27/2014   • Shoulder impingement 01/16/2014       ROS   No fever or chills.  No nausea or vomiting.  No chest pain or palpitations.  No SOB.  No pain with urination or hematuria.  No black or bloody stools.       Objective:     /60 (BP Location: Right arm, Patient Position: Sitting, BP Cuff Size: Adult)   Pulse 66   Temp 37.1 °C (98.7 °F) (Temporal)   Resp 16   Ht 1.626 m (5' 4.02\")   Wt 82.6 kg (182 lb)   SpO2 100%  Body mass index is 31.22 kg/m².   Physical Exam:  Well developed, well nourished.  Alert, oriented in no acute distress.  Eye contact is good, speech goal directed, affect calm  Eyes: conjunctiva non-injected, sclera non-icteric.  Ears: Pinna normal. TM pearly gray.   Mouth: Oral mucous membranes pink and moist with no lesions.  Mild diffuse erythema of the posterior pharynx  Neck Supple.  No adenopathy or masses in the neck or supraclavicular regions. No thyromegaly  Lungs: clear to auscultation bilaterally with good excursion. No wheezes or rhonchi  CV: regular rate and rhythm. No murmur            Assessment and Plan:   The following treatment plan was discussed    1. Hoarseness or changing voice  Refer to ENT for further evaluation treatment.  This could be GERD related and consider changing omeprazole but as she sings frequently concern for note.  - REFERRAL TO ENT    2. Need for vaccination    - Shingrix Vaccine    Any change or worsening of signs or symptoms, patient encouraged to follow-up or report to the emergency room for further evaluation. Patient understands and agrees.    Followup: Return if symptoms worsen or fail to improve.           "

## 2021-01-19 NOTE — ASSESSMENT & PLAN NOTE
Patient reports that she has had hoarseness for the last 3 to 6 months.  She reports that she coughs when she sings or talks for too long.  She denies any difficulty swallowing or food getting stuck.  No other URI symptoms.  She denies any increase in her GERD symptoms.  She is on omeprazole 40 mg daily.  She does have a history of a peptic ulcer.

## 2021-01-20 ENCOUNTER — HOSPITAL ENCOUNTER (OUTPATIENT)
Dept: LAB | Facility: MEDICAL CENTER | Age: 66
End: 2021-01-20
Attending: NURSE PRACTITIONER
Payer: COMMERCIAL

## 2021-01-20 ENCOUNTER — HOSPITAL ENCOUNTER (OUTPATIENT)
Dept: RADIOLOGY | Facility: MEDICAL CENTER | Age: 66
End: 2021-01-20
Attending: OTOLARYNGOLOGY
Payer: COMMERCIAL

## 2021-01-20 DIAGNOSIS — R40.4 EPISODIC ALTERED AWARENESS: ICD-10-CM

## 2021-01-20 DIAGNOSIS — E04.9 THYROID GOITER: ICD-10-CM

## 2021-01-20 DIAGNOSIS — G43.009 MIGRAINE WITHOUT AURA AND WITHOUT STATUS MIGRAINOSUS, NOT INTRACTABLE: ICD-10-CM

## 2021-01-20 LAB — VIT B12 SERPL-MCNC: 233 PG/ML (ref 211–911)

## 2021-01-20 PROCEDURE — 36415 COLL VENOUS BLD VENIPUNCTURE: CPT

## 2021-01-20 PROCEDURE — 76536 US EXAM OF HEAD AND NECK: CPT

## 2021-01-20 PROCEDURE — 82607 VITAMIN B-12: CPT

## 2021-01-21 DIAGNOSIS — E53.8 B12 DEFICIENCY: ICD-10-CM

## 2021-02-10 ENCOUNTER — TELEPHONE (OUTPATIENT)
Dept: NEUROLOGY | Facility: MEDICAL CENTER | Age: 66
End: 2021-02-10

## 2021-02-10 NOTE — TELEPHONE ENCOUNTER
Sig - Route: Take 1 Tab by mouth every day. - Oral    Sent to pharmacy as: Cyanocobalamin 2000 MCG Oral Tablet # 90 3 refills.      Above called into "[facility] @ patient's request.

## 2021-03-18 ENCOUNTER — HOSPITAL ENCOUNTER (OUTPATIENT)
Dept: RADIOLOGY | Facility: MEDICAL CENTER | Age: 66
End: 2021-03-18
Attending: OTOLARYNGOLOGY
Payer: COMMERCIAL

## 2021-03-18 DIAGNOSIS — E04.1 THYROID NODULE: ICD-10-CM

## 2021-03-18 PROCEDURE — 76536 US EXAM OF HEAD AND NECK: CPT

## 2021-04-07 ENCOUNTER — HOSPITAL ENCOUNTER (OUTPATIENT)
Dept: RADIOLOGY | Facility: MEDICAL CENTER | Age: 66
End: 2021-04-07
Attending: OTOLARYNGOLOGY
Payer: COMMERCIAL

## 2021-04-07 ENCOUNTER — HOSPITAL ENCOUNTER (OUTPATIENT)
Dept: CARDIOLOGY | Facility: MEDICAL CENTER | Age: 66
End: 2021-04-07
Attending: OTOLARYNGOLOGY
Payer: COMMERCIAL

## 2021-04-07 ENCOUNTER — HOSPITAL ENCOUNTER (OUTPATIENT)
Dept: LAB | Facility: MEDICAL CENTER | Age: 66
End: 2021-04-07
Attending: OTOLARYNGOLOGY
Payer: COMMERCIAL

## 2021-04-07 DIAGNOSIS — E04.1 NONTOXIC UNINODULAR GOITER: ICD-10-CM

## 2021-04-07 DIAGNOSIS — Z01.810 PRE-OPERATIVE CARDIOVASCULAR EXAMINATION: Primary | ICD-10-CM

## 2021-04-07 LAB
ALBUMIN SERPL BCP-MCNC: 4.1 G/DL (ref 3.2–4.9)
ALBUMIN/GLOB SERPL: 1.8 G/DL
ALP SERPL-CCNC: 75 U/L (ref 30–99)
ALT SERPL-CCNC: 19 U/L (ref 2–50)
ANION GAP SERPL CALC-SCNC: 9 MMOL/L (ref 7–16)
APTT PPP: 24.1 SEC (ref 24.7–36)
AST SERPL-CCNC: 18 U/L (ref 12–45)
BASOPHILS # BLD AUTO: 0.4 % (ref 0–1.8)
BASOPHILS # BLD: 0.02 K/UL (ref 0–0.12)
BILIRUB SERPL-MCNC: 0.2 MG/DL (ref 0.1–1.5)
BUN SERPL-MCNC: 17 MG/DL (ref 8–22)
CALCIUM SERPL-MCNC: 9.1 MG/DL (ref 8.4–10.2)
CHLORIDE SERPL-SCNC: 108 MMOL/L (ref 96–112)
CO2 SERPL-SCNC: 24 MMOL/L (ref 20–33)
CREAT SERPL-MCNC: 0.68 MG/DL (ref 0.5–1.4)
EKG IMPRESSION: NORMAL
EOSINOPHIL # BLD AUTO: 0.18 K/UL (ref 0–0.51)
EOSINOPHIL NFR BLD: 3.8 % (ref 0–6.9)
ERYTHROCYTE [DISTWIDTH] IN BLOOD BY AUTOMATED COUNT: 52.6 FL (ref 35.9–50)
FASTING STATUS PATIENT QL REPORTED: NORMAL
GLOBULIN SER CALC-MCNC: 2.3 G/DL (ref 1.9–3.5)
GLUCOSE SERPL-MCNC: 90 MG/DL (ref 65–99)
HCT VFR BLD AUTO: 43.6 % (ref 37–47)
HGB BLD-MCNC: 14.3 G/DL (ref 12–16)
IMM GRANULOCYTES # BLD AUTO: 0.01 K/UL (ref 0–0.11)
IMM GRANULOCYTES NFR BLD AUTO: 0.2 % (ref 0–0.9)
INR PPP: 0.94 (ref 0.87–1.13)
LYMPHOCYTES # BLD AUTO: 2 K/UL (ref 1–4.8)
LYMPHOCYTES NFR BLD: 42.5 % (ref 22–41)
MCH RBC QN AUTO: 28.7 PG (ref 27–33)
MCHC RBC AUTO-ENTMCNC: 32.8 G/DL (ref 33.6–35)
MCV RBC AUTO: 87.6 FL (ref 81.4–97.8)
MONOCYTES # BLD AUTO: 0.39 K/UL (ref 0–0.85)
MONOCYTES NFR BLD AUTO: 8.3 % (ref 0–13.4)
NEUTROPHILS # BLD AUTO: 2.11 K/UL (ref 2–7.15)
NEUTROPHILS NFR BLD: 44.8 % (ref 44–72)
NRBC # BLD AUTO: 0 K/UL
NRBC BLD-RTO: 0 /100 WBC
PLATELET # BLD AUTO: 249 K/UL (ref 164–446)
PMV BLD AUTO: 9.6 FL (ref 9–12.9)
POTASSIUM SERPL-SCNC: 4.5 MMOL/L (ref 3.6–5.5)
PROT SERPL-MCNC: 6.4 G/DL (ref 6–8.2)
PROTHROMBIN TIME: 12.3 SEC (ref 12–14.6)
RBC # BLD AUTO: 4.98 M/UL (ref 4.2–5.4)
SODIUM SERPL-SCNC: 141 MMOL/L (ref 135–145)
WBC # BLD AUTO: 4.7 K/UL (ref 4.8–10.8)

## 2021-04-07 PROCEDURE — 36415 COLL VENOUS BLD VENIPUNCTURE: CPT

## 2021-04-07 PROCEDURE — 85730 THROMBOPLASTIN TIME PARTIAL: CPT

## 2021-04-07 PROCEDURE — 86376 MICROSOMAL ANTIBODY EACH: CPT

## 2021-04-07 PROCEDURE — 93010 ELECTROCARDIOGRAM REPORT: CPT | Performed by: INTERNAL MEDICINE

## 2021-04-07 PROCEDURE — 85610 PROTHROMBIN TIME: CPT

## 2021-04-07 PROCEDURE — 86800 THYROGLOBULIN ANTIBODY: CPT

## 2021-04-07 PROCEDURE — 93005 ELECTROCARDIOGRAM TRACING: CPT | Performed by: OTOLARYNGOLOGY

## 2021-04-07 PROCEDURE — 71046 X-RAY EXAM CHEST 2 VIEWS: CPT

## 2021-04-07 PROCEDURE — 85025 COMPLETE CBC W/AUTO DIFF WBC: CPT

## 2021-04-07 PROCEDURE — 80053 COMPREHEN METABOLIC PANEL: CPT

## 2021-04-08 LAB
THYROGLOB AB SERPL-ACNC: 8.3 IU/ML (ref 0–4)
THYROPEROXIDASE AB SERPL-ACNC: 0.3 IU/ML (ref 0–9)

## 2021-04-15 DIAGNOSIS — E03.9 ACQUIRED HYPOTHYROIDISM: ICD-10-CM

## 2021-04-15 RX ORDER — LEVOTHYROXINE SODIUM 137 MCG
137 TABLET ORAL
Qty: 90 TABLET | Refills: 3 | Status: SHIPPED | OUTPATIENT
Start: 2021-04-15 | End: 2022-02-23

## 2021-04-15 NOTE — TELEPHONE ENCOUNTER
Received request via: Patient    Was the patient seen in the last year in this department? Yes  1/11/21  Does the patient have an active prescription (recently filled or refills available) for medication(s) requested? No

## 2021-04-21 RX ORDER — ALBUTEROL SULFATE 90 UG/1
2 AEROSOL, METERED RESPIRATORY (INHALATION) EVERY 4 HOURS PRN
Qty: 1 EACH | Refills: 3 | Status: SHIPPED | OUTPATIENT
Start: 2021-04-21 | End: 2023-06-27 | Stop reason: SDUPTHER

## 2021-04-21 NOTE — TELEPHONE ENCOUNTER
Received request via: Pharmacy    Was the patient seen in the last year in this department? Yes1/11/2021    Does the patient have an active prescription (recently filled or refills available) for medication(s) requested? No

## 2021-04-23 DIAGNOSIS — F32.0 CURRENT MILD EPISODE OF MAJOR DEPRESSIVE DISORDER WITHOUT PRIOR EPISODE (HCC): ICD-10-CM

## 2021-04-23 RX ORDER — ESCITALOPRAM OXALATE 10 MG/1
TABLET ORAL
Qty: 30 TABLET | Refills: 2 | Status: SHIPPED | OUTPATIENT
Start: 2021-04-23 | End: 2021-05-17

## 2021-04-23 NOTE — TELEPHONE ENCOUNTER
Received request via: Patient    Was the patient seen in the last year in this department? Yes  LOV 01/11/2021  Does the patient have an active prescription (recently filled or refills available) for medication(s) requested? No     Patient called and stated she needed medication filled today. Can you send since PCP is [facility]OO?

## 2021-05-17 DIAGNOSIS — F32.0 CURRENT MILD EPISODE OF MAJOR DEPRESSIVE DISORDER WITHOUT PRIOR EPISODE (HCC): ICD-10-CM

## 2021-05-17 NOTE — TELEPHONE ENCOUNTER
Received request via: Pharmacy    Was the patient seen in the last year in this department? Yes  1/11/2021LOV  Does the patient have an active prescription (recently filled or refills available) for medication(s) requested? No

## 2021-05-18 RX ORDER — ESCITALOPRAM OXALATE 10 MG/1
TABLET ORAL
Qty: 30 TABLET | Refills: 2 | Status: SHIPPED | OUTPATIENT
Start: 2021-05-18 | End: 2021-07-07

## 2021-07-13 ENCOUNTER — OFFICE VISIT (OUTPATIENT)
Dept: NEUROLOGY | Facility: MEDICAL CENTER | Age: 66
End: 2021-07-13
Attending: NURSE PRACTITIONER
Payer: COMMERCIAL

## 2021-07-13 VITALS
HEIGHT: 64 IN | RESPIRATION RATE: 14 BRPM | WEIGHT: 188.8 LBS | BODY MASS INDEX: 32.23 KG/M2 | SYSTOLIC BLOOD PRESSURE: 110 MMHG | TEMPERATURE: 97.9 F | OXYGEN SATURATION: 96 % | HEART RATE: 71 BPM | DIASTOLIC BLOOD PRESSURE: 70 MMHG

## 2021-07-13 DIAGNOSIS — G43.009 MIGRAINE WITHOUT AURA AND WITHOUT STATUS MIGRAINOSUS, NOT INTRACTABLE: ICD-10-CM

## 2021-07-13 PROBLEM — E04.9 THYROID GOITER: Status: ACTIVE | Noted: 2021-04-19

## 2021-07-13 PROCEDURE — 99212 OFFICE O/P EST SF 10 MIN: CPT | Performed by: NURSE PRACTITIONER

## 2021-07-13 PROCEDURE — 99213 OFFICE O/P EST LOW 20 MIN: CPT | Performed by: NURSE PRACTITIONER

## 2021-07-13 ASSESSMENT — ENCOUNTER SYMPTOMS
HEARTBURN: 0
NECK PAIN: 1
MEMORY LOSS: 1
FEVER: 0
BLURRED VISION: 0
SINUS PAIN: 0
COUGH: 1
HEADACHES: 1
VOMITING: 0
BRUISES/BLEEDS EASILY: 1
DIZZINESS: 1
NAUSEA: 0

## 2021-07-13 ASSESSMENT — FIBROSIS 4 INDEX: FIB4 SCORE: 1.08

## 2021-07-13 NOTE — PROGRESS NOTES
Subjective:      HPI     Lolly English is a 66 y.o. female who presents for follow up for chronic migraine. I last saw her in 2021    Since last visit, she had a left shruti-thyroidectomy for thyroid cancer.    We started her on Ajovy at last visit which has reduced her migraines from twice per week to 1 in the past 6 months.  She has not had any side effects from Ajovy.     She still has some minor headaches for a few days when it is time for her Ajovy. Her speech and memory has improved since last visit.         PMH includes hypothyroidism, mild depression, peptic ulcer disease, HSV infection, lumbar radiculopathy, asthma, chronic asthma     Family Hx:  Father's side - heart disease, brother-HTN, mother- Lupus, arthritis, migraines. Paternal grandmother probably had dementia but she was in her 90s when she .  Maternal grand-mother was found several times wandering in her life (she committed suicide in her 60s)     Social Hx:  Smoked a little in college, 2 glasses of wine 4-5 nights per week, denies drug use.  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        Age at Onset:  40  Triggers:  Gluten, red plums,   Alleviating factors:  Sumatriptan sometimes works,   Meds tried and result:    Rizatriptan works very well, sumatriptan worked somewhat,  Hormones:   none  Caffeine use:  2 cups of coffee daily  OTC medications--frequency:  Aleve 3 times weekly, rare tylenol.  Smoking: Remote smoking (2 cigs daily in college)  Alcohol use:  2 glasses of wine 4-5 days per week  Sleep apnea:  Witnessed snoring, no witnessed apneas.  Family Hx:  Mother -migraine  How many days per month: <   Migraine 3-4 minor headaches  Quality:  Start in the left occipital area and comes up to the top of the head, the pain feels like a vise , pounding, pain as high as 7/10l  Has brain fog the next day.     Went to the ER in 2020 for 28 hour migraine.   No other ER or urgent care visits in the past  year.  N&V:   Rare vomiting, nausea +  Photo/phonophobia:  light>sound  Aura:  none       Review of Systems   Constitutional: Positive for malaise/fatigue. Negative for fever.   HENT: Negative for congestion and sinus pain.    Eyes: Negative for blurred vision.   Respiratory: Positive for cough.    Cardiovascular: Negative for chest pain.   Gastrointestinal: Negative for heartburn, nausea and vomiting.   Musculoskeletal: Positive for neck pain.   Neurological: Positive for dizziness and headaches.   Endo/Heme/Allergies: Bruises/bleeds easily.   Psychiatric/Behavioral: Positive for memory loss.     Current Outpatient Medications on File Prior to Visit   Medication Sig Dispense Refill   • escitalopram (LEXAPRO) 10 MG Tab TAKE ONE TABLET BY MOUTH DAILY (LEXAPRO) 90 tablet 1   • albuterol 108 (90 Base) MCG/ACT Aero Soln inhalation aerosol Inhale 2 Puffs every four hours as needed for Shortness of Breath (cough and wheezing). 1 Each 3   • SYNTHROID 137 MCG Tab Take 1 tablet by mouth every morning on an empty stomach. 90 tablet 3   • Fremanezumab-vfrm 225 MG/1.5ML Solution Auto-injector Inject 225 mg under the skin every 4 weeks. 1.68 mL 12   • rizatriptan (MAXALT) 10 MG tablet Take 1/2  tablet by mouth at onset of headache, may repeat dose in 2 hours if unrelieved.  Do not exceed more than 1 tablets in 24 hours. 9 Tab 11   • acyclovir (ZOVIRAX) 400 MG tablet TAKE ONE TABLET BY MOUTH THREE TIMES A DAY 30 Tab 3   • omeprazole (PRILOSEC) 40 MG delayed-release capsule Take 1 Cap by mouth every day. 30 Cap 2     No current facility-administered medications on file prior to visit.     Past Medical History:   Diagnosis Date   • Anesthesia     wakes up manic   • Arthritis    • ASTHMA    • Asthma 7/11/2014   • Breath shortness     asthma related   • Chest pain 7/10/2014   • Endometriosis of vagina 9/19/2016    Overview:  AREA NOT SPECIFIED    • H/O migraine 7/11/2014   • HSV infection 9/19/2016   • Hyperlipidemia 9/19/2016   •  "Hyperthyroidism    • Knee osteoarthritis 9/12/2014   • Lymphocytic thyroiditis 9/19/2016   • Migraine without aura and without status migrainosus, not intractable 9/19/2016   • Psychiatric problem    • Renal colic 9/19/2016   • Renal disorder    • Rotator cuff tear 1/30/2014   • Vitamin D deficiency 9/19/2016        Objective:   /70 (BP Location: Left arm, Patient Position: Sitting, BP Cuff Size: Adult long)   Pulse 71   Temp 36.6 °C (97.9 °F) (Temporal)   Resp 14   Ht 1.626 m (5' 4\")   Wt 85.6 kg (188 lb 12.8 oz)   SpO2 96%   BMI 32.41 kg/m²        PHYSICAL EXAM  Constitutional:  Alert, no apparent distress,  Psych:   mood and affect WNL     Neuro:  Oriented X 4, speech fluent, naming and memory intact          Muskuloskeletal:  Moves all extremities equally, strength 5/5 bilaterally,          CN II: . Fundoscopic exam is normal with sharp discs and no vascular changes. Pupils are 3 mm and briskly reactive to light.          CN III, IV, VI  EOMs intact, no ptosis         CN V: Corneal responses are intact.         CN VII: Face is symmetric with normal eye closure and smile.         CN IX, X: Palate elevates symmetrically. Phonation is normal.         CN XI: Head turning and shoulder shrug are intact         CN XII: Tongue is midline with normal movements and no atrophy.                                       Ambulates with steady gait.                               Cardiovascular:  , no peripheral edema  Neck:                  supple   Pulmonary:            Respirations easy, lungs   Skin:                     No obvious rashes.       Assessment/Plan:        1. Migraine without aura and without status migrainosus, not intractable     Continue Rizatriptan 10mg for rescue  Take 1/2 tablet po at onset of headache, may repeat dose in 2 hours if unrelieved.  Do not exceed more than 2 tablets in 24 hours.     Cannot use beta blocker due to ashtma     Cannot use SSRI/SNRI/TCA as she is already on SSRI and " sumatriptan     Would avoid topamax due to current problems with word finding.     Would also avoid zonisamide due to loss of memory at night.     ContinueAjovy 225mg every 4 weeks,      continue:  magnesium oxide 400mg by mouth every night, may take extra dose if needed for headache (over the counter), hold for diarrhea          Riboflavin (Vitamin B2) 400mg by mouth every night (over the counter),may turn urine bright yellow        COQ 10, take 300mg every night. (over the counter)          Attempt to go to bed and get up at the same time every night           Eat meals on regular basis            Stay hydrated.             Aerobic exercise 30 minutes daily             Avoid aged or smoked foods, avoid processed foods, red wine, aged cheese              Keep headache diary, include foods that you may have eaten.             Avoid overusing over the counter medications:  Do not take more than 500mg acetaminophen (tylenol), more than 4 times weekly, more frequent or larger doses are associated with medication overuse headache.               I counseled patient on migraine triggers, lifestyle changes, medication overuse, supplements and medication side effects.        Follow up  In 6 months.     I counseled patient on migraine triggers, lifestyle changes, medication overuse, supplements and medication side effects.

## 2021-08-04 ENCOUNTER — OFFICE VISIT (OUTPATIENT)
Dept: MEDICAL GROUP | Facility: LAB | Age: 66
End: 2021-08-04
Payer: MEDICARE

## 2021-08-04 ENCOUNTER — HOSPITAL ENCOUNTER (OUTPATIENT)
Dept: RADIOLOGY | Facility: MEDICAL CENTER | Age: 66
End: 2021-08-04
Attending: FAMILY MEDICINE
Payer: MEDICARE

## 2021-08-04 VITALS
BODY MASS INDEX: 32.44 KG/M2 | SYSTOLIC BLOOD PRESSURE: 120 MMHG | OXYGEN SATURATION: 97 % | RESPIRATION RATE: 14 BRPM | DIASTOLIC BLOOD PRESSURE: 70 MMHG | WEIGHT: 190 LBS | HEIGHT: 64 IN | TEMPERATURE: 97.9 F | HEART RATE: 66 BPM

## 2021-08-04 DIAGNOSIS — C73 FOLLICULAR THYROID CANCER (HCC): ICD-10-CM

## 2021-08-04 DIAGNOSIS — G56.03 BILATERAL CARPAL TUNNEL SYNDROME: ICD-10-CM

## 2021-08-04 DIAGNOSIS — E78.5 DYSLIPIDEMIA: ICD-10-CM

## 2021-08-04 DIAGNOSIS — E06.3 LYMPHOCYTIC THYROIDITIS: ICD-10-CM

## 2021-08-04 DIAGNOSIS — M65.331 TRIGGER MIDDLE FINGER OF RIGHT HAND: ICD-10-CM

## 2021-08-04 DIAGNOSIS — M79.672 LEFT FOOT PAIN: ICD-10-CM

## 2021-08-04 PROCEDURE — 99214 OFFICE O/P EST MOD 30 MIN: CPT | Performed by: FAMILY MEDICINE

## 2021-08-04 PROCEDURE — 73630 X-RAY EXAM OF FOOT: CPT | Mod: LT

## 2021-08-04 RX ORDER — OMEPRAZOLE 20 MG/1
CAPSULE, DELAYED RELEASE ORAL
COMMUNITY
Start: 2021-08-02 | End: 2022-06-03

## 2021-08-04 RX ORDER — CELECOXIB 200 MG/1
200 CAPSULE ORAL 2 TIMES DAILY
Qty: 60 CAPSULE | Refills: 1 | Status: SHIPPED | OUTPATIENT
Start: 2021-08-04 | End: 2021-09-27

## 2021-08-04 ASSESSMENT — FIBROSIS 4 INDEX: FIB4 SCORE: 1.08

## 2021-08-05 ENCOUNTER — TELEPHONE (OUTPATIENT)
Dept: MEDICAL GROUP | Facility: LAB | Age: 66
End: 2021-08-05

## 2021-08-06 NOTE — TELEPHONE ENCOUNTER
DOCUMENTATION OF PAR STATUS:    1. Name of Medication & Dose: Synthroid 137 mcg      2. Name of Prescription Coverage Company & phone #: [employer]    3. Date Prior Auth Submitted: 08/05/21    4. What information was given to obtain insurance decision? Thyroidectomy in April 2021    5. Prior Auth Status? Pending    6. Patient Notified: no

## 2021-08-10 NOTE — ASSESSMENT & PLAN NOTE
Patient has known bilateral carpal tunnel syndrome.  She had been getting her Celebrex from her orthopedics but would like me to assume care.  She is also like a referral to see orthopedics.

## 2021-08-10 NOTE — ASSESSMENT & PLAN NOTE
Dyslipidemia Chronic condition. Current treatments: diet/exercise.  She would like to avoid statins if needed.  The 10-year ASCVD risk score (Joseja WISDOM Jr., et al., 2013) is: 4.7%

## 2021-08-10 NOTE — ASSESSMENT & PLAN NOTE
Stable. Currently taking Synthroid 137 mcg daily as prescribed.  Denies palpitations, skin changes, temperature intolerance, or changes in bowel habits

## 2021-08-10 NOTE — PROGRESS NOTES
Subjective:     Chief Complaint   Patient presents with   • Foot Pain     left foot x 3 weeks   • Carpal Tunnel     bilateral hands/wrists       Corrinne Lynne Cibulsky is a 66 y.o. female here today for evaluation and management of:    Bilateral carpal tunnel syndrome  Patient has known bilateral carpal tunnel syndrome.  She had been getting her Celebrex from her orthopedics but would like me to assume care.  She is also like a referral to see orthopedics.    Dyslipidemia  Dyslipidemia Chronic condition. Current treatments: diet/exercise.  She would like to avoid statins if needed.  The 10-year ASCVD risk score (Aurora DC Jr., et al., 2013) is: 4.7%        Lymphocytic thyroiditis  Stable. Currently taking Synthroid 137 mcg daily as prescribed.  Denies palpitations, skin changes, temperature intolerance, or changes in bowel habits      Patient is also complaining of persistent left foot pain.  She twisted it several weeks ago when she started having pain.  No current swelling.  She is able to bear weight.    Allergies   Allergen Reactions   • Gluten      GI upset   • Morphine Vomiting   • Pcn [Penicillins] Rash     Tolerates Ancef.   • Percocet [Oxycodone-Acetaminophen] Unspecified     migraine  migraine   • Gluten Meal Unspecified   • Oxycodone-Acetaminophen Unspecified       Current medicines (including changes today)  Current Outpatient Medications   Medication Sig Dispense Refill   • celecoxib (CELEBREX) 200 MG Cap Take 1 capsule by mouth 2 times a day. 60 capsule 1   • omeprazole (PRILOSEC) 20 MG delayed-release capsule      • Fremanezumab-vfrm 225 MG/1.5ML Solution Auto-injector Inject 225 mg under the skin every 4 weeks. 1.5 mL 12   • escitalopram (LEXAPRO) 10 MG Tab TAKE ONE TABLET BY MOUTH DAILY (LEXAPRO) 90 tablet 1   • albuterol 108 (90 Base) MCG/ACT Aero Soln inhalation aerosol Inhale 2 Puffs every four hours as needed for Shortness of Breath (cough and wheezing). 1 Each 3   • SYNTHROID 137 MCG Tab Take 1  tablet by mouth every morning on an empty stomach. 90 tablet 3   • rizatriptan (MAXALT) 10 MG tablet Take 1/2  tablet by mouth at onset of headache, may repeat dose in 2 hours if unrelieved.  Do not exceed more than 1 tablets in 24 hours. 9 Tab 11   • acyclovir (ZOVIRAX) 400 MG tablet TAKE ONE TABLET BY MOUTH THREE TIMES A DAY 30 Tab 3   • omeprazole (PRILOSEC) 40 MG delayed-release capsule Take 1 Cap by mouth every day. 30 Cap 2     No current facility-administered medications for this visit.       She  has a past medical history of Anesthesia, Arthritis, ASTHMA, Asthma (7/11/2014), Breath shortness, Chest pain (7/10/2014), Endometriosis of vagina (9/19/2016), Follicular thyroid cancer (HCC) (8/4/2021), H/O migraine (7/11/2014), HSV infection (9/19/2016), Hyperlipidemia (9/19/2016), Hyperthyroidism, Knee osteoarthritis (9/12/2014), Lymphocytic thyroiditis (9/19/2016), Migraine without aura and without status migrainosus, not intractable (9/19/2016), Psychiatric problem, Renal colic (9/19/2016), Renal disorder, Rotator cuff tear (1/30/2014), and Vitamin D deficiency (9/19/2016). She also has no past medical history of Arrhythmia, Bronchitis, Cataract, Cold, Congestive heart failure (HCC), Coughing blood, Dental disorder, Glaucoma, Heart burn, Heart valve disease, Hepatitis A, Hepatitis B, Hepatitis C, High cholesterol, Hypertension, Indigestion, Myocardial infarct (HCC), Other and unspecified angina pectoris, Other emphysema (HCC), Pacemaker, Personal history of venous thrombosis and embolism, Pneumonia, Rheumatic fever, Seizure (HCC), Sleep apnea, Snoring, Tuberculosis, Type II or unspecified type diabetes mellitus without mention of complication, not stated as uncontrolled, Unspecified hemorrhagic conditions, or Unspecified urinary incontinence.    Patient Active Problem List    Diagnosis Date Noted   • Bilateral carpal tunnel syndrome 08/04/2021   • 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   • Thyroid goiter  "04/19/2021   • Hoarseness or changing voice 01/11/2021   • Episodic altered awareness 10/13/2020   • Memory change 10/13/2020   • Current mild episode of major depressive disorder without prior episode (HCC) 08/27/2020   • Memory changes 08/27/2020   • Lumbar radiculopathy 05/26/2020   • Mild intermittent asthma without complication 05/14/2020   • Dense breast tissue on mammogram 05/14/2020   • Spondylolisthesis of lumbar region 01/07/2020   • Peptic ulcer 08/14/2019   • Epigastric pain 10/09/2018   • Obesity (BMI 30-39.9) 05/09/2017   • Endometriosis of vagina 09/19/2016   • Lymphocytic thyroiditis 09/19/2016   • Renal colic 09/19/2016   • Vitamin D deficiency 09/19/2016   • Dyslipidemia 09/19/2016   • Migraine without aura and without status migrainosus, not intractable 09/19/2016   • HSV infection 09/19/2016   • Knee osteoarthritis 09/12/2014   • Hypokalemia 07/11/2014   • S/P rotator cuff repair 02/27/2014   • Shoulder impingement 01/16/2014       ROS   No fever or chills.  No nausea or vomiting.  No chest pain or palpitations.  No cough or SOB.  No pain with urination or hematuria.  No black or bloody stools.       Objective:     /70 (BP Location: Right arm, Patient Position: Sitting, BP Cuff Size: Adult)   Pulse 66   Temp 36.6 °C (97.9 °F) (Temporal)   Resp 14   Ht 1.626 m (5' 4\")   Wt 86.2 kg (190 lb)   SpO2 97%  Body mass index is 32.61 kg/m².   Physical Exam:  Well developed, well nourished.  Alert, oriented in no acute distress.  Eye contact is good, speech goal directed, affect calm  Eyes: conjunctiva non-injected, sclera non-icteric.  Neck Supple.  No adenopathy or masses in the neck or supraclavicular regions. No thyromegaly  Lungs: clear to auscultation bilaterally with good excursion. No wheezes or rhonchi  CV: regular rate and rhythm. No murmur  Left  to palpation of the midfoot.  No ecchymosis or swelling.  Neurovascular intact distally.  Wrist/Hand: No swelling or bruising " noted. Tenderness to palpation bilaterally. No tenderness at snuffbox. Range of motion intact. Strength and sensation intact.  Good  strength. 2+ radial pulse. Finkelstein's test: Positive.  Trigger finger of right middle finger      Assessment and Plan:   The following treatment plan was discussed    1. Left foot pain  X-ray of the foot to assess.  Discussed most likely a sprain.  Refill Celebrex for as needed use.  Ice and elevate.  Patient to call if not improving  - celecoxib (CELEBREX) 200 MG Cap; Take 1 capsule by mouth 2 times a day.  Dispense: 60 capsule; Refill: 1  - DX-FOOT-COMPLETE 3+ LEFT; Future    2. Bilateral carpal tunnel syndrome  Refer to hand orthopedist for further evaluation and treatment.  Celebrex as needed for pain.  Patient education materials given.  - celecoxib (CELEBREX) 200 MG Cap; Take 1 capsule by mouth 2 times a day.  Dispense: 60 capsule; Refill: 1  - REFERRAL TO ORTHOPEDICS    3. Trigger middle finger of right hand  Refer to hand orthopedist for further evaluation and treatment.  Celebrex as needed for pain.  Patient education materials given.  - celecoxib (CELEBREX) 200 MG Cap; Take 1 capsule by mouth 2 times a day.  Dispense: 60 capsule; Refill: 1  - REFERRAL TO ORTHOPEDICS    4. Follicular thyroid cancer (HCC)  Patient is status post surgery and has an appointment with an endocrinologist but recently changed to [facility].  We will place referrals for follow-up of both of these specialist.  Continue to monitor labs.  - REFERRAL TO ENDOCRINOLOGY  - REFERRAL TO ENT    5. Lymphocytic thyroiditis  Patient is status post surgery and has an appointment with an endocrinologist but recently changed to [facility].  We will place referrals for follow-up of both of these specialist.  Continue to monitor labs.  - REFERRAL TO ENDOCRINOLOGY    6. Dyslipidemia  Check labs.  Patient would like to avoid statins if possible.  Mediterranean eating plan information given.  - Lipid  Profile; Future    Any change or worsening of signs or symptoms, patient encouraged to follow-up or report to the emergency room for further evaluation. Patient understands and agrees.    Followup: Return in about 3 months (around 11/4/2021).

## 2021-08-27 ENCOUNTER — PATIENT MESSAGE (OUTPATIENT)
Dept: HEALTH INFORMATION MANAGEMENT | Facility: OTHER | Age: 66
End: 2021-08-27

## 2021-09-07 ENCOUNTER — HOSPITAL ENCOUNTER (OUTPATIENT)
Dept: LAB | Facility: MEDICAL CENTER | Age: 66
End: 2021-09-07
Attending: FAMILY MEDICINE
Payer: MEDICARE

## 2021-09-07 DIAGNOSIS — E78.5 DYSLIPIDEMIA: ICD-10-CM

## 2021-09-07 LAB
CHOLEST SERPL-MCNC: 231 MG/DL (ref 100–199)
FASTING STATUS PATIENT QL REPORTED: NORMAL
HDLC SERPL-MCNC: 85 MG/DL
LDLC SERPL CALC-MCNC: 128 MG/DL
TRIGL SERPL-MCNC: 90 MG/DL (ref 0–149)

## 2021-09-07 PROCEDURE — 36415 COLL VENOUS BLD VENIPUNCTURE: CPT

## 2021-09-07 PROCEDURE — 80061 LIPID PANEL: CPT

## 2021-10-13 PROBLEM — G56.02 CARPAL TUNNEL SYNDROME ON LEFT: Status: ACTIVE | Noted: 2021-10-13

## 2021-10-26 ENCOUNTER — TELEPHONE (OUTPATIENT)
Dept: SURGERY | Facility: MEDICAL CENTER | Age: 66
End: 2021-10-26

## 2021-10-26 ENCOUNTER — NON-PROVIDER VISIT (OUTPATIENT)
Dept: MEDICAL GROUP | Facility: LAB | Age: 66
End: 2021-10-26
Payer: MEDICARE

## 2021-10-26 DIAGNOSIS — Z23 NEED FOR VACCINATION: ICD-10-CM

## 2021-10-26 PROCEDURE — 90662 IIV NO PRSV INCREASED AG IM: CPT | Performed by: FAMILY MEDICINE

## 2021-10-26 PROCEDURE — G0008 ADMIN INFLUENZA VIRUS VAC: HCPCS | Performed by: FAMILY MEDICINE

## 2021-10-26 NOTE — PROGRESS NOTES
"Mónica English is a 66 y.o. female here for a non-provider visit for:   FLU    Reason for immunization: Annual Flu Vaccine  Immunization records indicate need for vaccine: Yes, confirmed with Epic  Minimum interval has been met for this vaccine: Yes  ABN completed: Yes    VIS Dated   was given to patient: Yes  All IAC Questionnaire questions were answered \"No.\"    Patient tolerated injection and no adverse effects were observed or reported: Yes    Pt scheduled for next dose in series: No  "

## 2021-10-27 ENCOUNTER — HOSPITAL ENCOUNTER (OUTPATIENT)
Dept: LAB | Facility: MEDICAL CENTER | Age: 66
End: 2021-10-27
Attending: INTERNAL MEDICINE
Payer: MEDICARE

## 2021-10-27 LAB
T4 FREE SERPL-MCNC: 1.41 NG/DL (ref 0.93–1.7)
THYROPEROXIDASE AB SERPL-ACNC: <9 IU/ML (ref 0–9)
TSH SERPL DL<=0.005 MIU/L-ACNC: 3.79 UIU/ML (ref 0.38–5.33)

## 2021-10-27 PROCEDURE — 84439 ASSAY OF FREE THYROXINE: CPT

## 2021-10-27 PROCEDURE — 84443 ASSAY THYROID STIM HORMONE: CPT

## 2021-10-27 PROCEDURE — 36415 COLL VENOUS BLD VENIPUNCTURE: CPT

## 2021-10-27 PROCEDURE — 84432 ASSAY OF THYROGLOBULIN: CPT

## 2021-10-27 PROCEDURE — 86376 MICROSOMAL ANTIBODY EACH: CPT

## 2021-10-27 PROCEDURE — 86800 THYROGLOBULIN ANTIBODY: CPT

## 2021-11-01 LAB
THYROGLOB AB SERPL-ACNC: 6.8 IU/ML (ref 0–4)
THYROGLOB SERPL-MCNC: <0.5 NG/ML (ref 1.3–31.8)
THYROGLOB SERPL-MCNC: ABNORMAL NG/ML (ref 1.3–31.8)

## 2021-11-03 ENCOUNTER — OFFICE VISIT (OUTPATIENT)
Dept: URGENT CARE | Facility: CLINIC | Age: 66
End: 2021-11-03
Payer: MEDICARE

## 2021-11-03 ENCOUNTER — TELEPHONE (OUTPATIENT)
Dept: MEDICAL GROUP | Facility: LAB | Age: 66
End: 2021-11-03

## 2021-11-03 VITALS
HEIGHT: 65 IN | DIASTOLIC BLOOD PRESSURE: 82 MMHG | OXYGEN SATURATION: 99 % | BODY MASS INDEX: 30.82 KG/M2 | TEMPERATURE: 98.4 F | RESPIRATION RATE: 16 BRPM | WEIGHT: 185 LBS | SYSTOLIC BLOOD PRESSURE: 134 MMHG | HEART RATE: 84 BPM

## 2021-11-03 DIAGNOSIS — J98.01 ACUTE BRONCHOSPASM: ICD-10-CM

## 2021-11-03 DIAGNOSIS — U07.1 COVID-19 VIRUS INFECTION: Primary | ICD-10-CM

## 2021-11-03 DIAGNOSIS — R05.9 COUGH: ICD-10-CM

## 2021-11-03 DIAGNOSIS — Z20.822 EXPOSURE TO CONFIRMED CASE OF COVID-19: ICD-10-CM

## 2021-11-03 LAB
EXTERNAL QUALITY CONTROL: NORMAL
SARS-COV+SARS-COV-2 AG RESP QL IA.RAPID: POSITIVE

## 2021-11-03 PROCEDURE — 99214 OFFICE O/P EST MOD 30 MIN: CPT | Mod: CS | Performed by: PHYSICIAN ASSISTANT

## 2021-11-03 PROCEDURE — 87426 SARSCOV CORONAVIRUS AG IA: CPT | Mod: QW | Performed by: PHYSICIAN ASSISTANT

## 2021-11-03 RX ORDER — LEVOTHYROXINE SODIUM 137 UG/1
137 TABLET ORAL DAILY
COMMUNITY
Start: 2021-08-04 | End: 2021-12-06

## 2021-11-03 RX ORDER — SUMATRIPTAN 50 MG/1
TABLET, FILM COATED ORAL
COMMUNITY
End: 2022-06-03

## 2021-11-03 RX ORDER — ACETAMINOPHEN 325 MG/1
TABLET ORAL PRN
COMMUNITY
End: 2022-06-03

## 2021-11-03 RX ORDER — ACYCLOVIR 400 MG/1
TABLET ORAL PRN
COMMUNITY
End: 2021-11-03

## 2021-11-03 RX ORDER — METHYLPREDNISOLONE 4 MG/1
TABLET ORAL
Qty: 21 TABLET | Refills: 0 | Status: SHIPPED | OUTPATIENT
Start: 2021-11-03 | End: 2021-12-06

## 2021-11-03 RX ORDER — MONTELUKAST SODIUM 4 MG/1
TABLET, CHEWABLE ORAL
COMMUNITY
End: 2021-11-03

## 2021-11-03 RX ORDER — CELECOXIB 200 MG/1
1 CAPSULE ORAL
COMMUNITY
Start: 2021-09-27 | End: 2021-11-03

## 2021-11-03 ASSESSMENT — FIBROSIS 4 INDEX: FIB4 SCORE: 1.08

## 2021-11-03 ASSESSMENT — ENCOUNTER SYMPTOMS
DIARRHEA: 1
CHILLS: 0
COUGH: 1
WHEEZING: 1
HEADACHES: 1
RHINORRHEA: 0
SHORTNESS OF BREATH: 0
SPUTUM PRODUCTION: 0
FEVER: 0

## 2021-11-03 NOTE — TELEPHONE ENCOUNTER
1. Caller Name: Mónica English                        Call Back Number: [phone]      How would the patient prefer to be contacted with a response: Phone call OK to leave a detailed message    Patient called and stated that she went to an UC as suggested to get a rapid covid test. The results came back positive. Patient stated that the provider there said she may be eligible for the antibody treatment due to her underlying conditions.     Please advise.   Schedule patient for an appointment?

## 2021-11-03 NOTE — TELEPHONE ENCOUNTER
Called and left a voicemail advising patient that the urgent care locations offer the rapid covid testing but our medical groups do not. Advised patient that this would be her best bet but if she'd like to come in for an appointment to call back and we can get her scheduled with another provider either today or tomorrow.

## 2021-11-03 NOTE — PROGRESS NOTES
Mary Carmen English is a 66 y.o. female who presents with Cough (worsening over past 3 days ) and Coronavirus Screening (exposed)    Medications:    • acetaminophen Tabs  • acyclovir  • albuterol Aers  • celecoxib Caps  • escitalopram Tabs  • Fremanezumab-vfrm Soaj  • levothyroxine Tabs  • methylPREDNISolone Tbpk  • omeprazole  • rizatriptan  • SUMAtriptan Tabs  • Synthroid Tabs    Allergies: Gluten, Morphine, Pcn [penicillins], Percocet [oxycodone-acetaminophen], Gluten meal, and Oxycodone-acetaminophen    Problem List: Corrinne Lynne Cibulsky does not have any pertinent problems on file.    Surgical History:  Past Surgical History:   Procedure Laterality Date   • KNEE ARTHROPLASTY TOTAL Left 10/23/2015    Procedure: LEFT TOTAL KNEE REPLACEMENT;  Surgeon: Leeroy Camacho M.D.;  Location: SURGERY [facility];  Service:    • KNEE ARTHROPLASTY TOTAL  9/12/2014    Performed by Leeroy Camacho M.D. at SURGERY [facility]   • TIFFANY BY LAPAROSCOPY     • PB ANESTH,NANCY DEL AFTER NEURAXIAL ANESTH     • SHOULDER ARTHROSCOPY         Past Social Hx: Corrinne Lynne Cibulsky  reports that she quit smoking about 41 years ago. Her smoking use included cigarettes. She has a 2.50 pack-year smoking history. She has never used smokeless tobacco. She reports current alcohol use of about 2.5 oz of alcohol per week. She reports that she does not use drugs.     Past Family Hx:  Corrinne Lynne Cibulsky family history includes Alcohol/Drug in her father; Arthritis in her mother; Cancer in her mother and sister; Diabetes in her mother; Heart Disease in her brother and father; Lung Disease in her mother.     Problem list, medications, and allergies reviewed by myself today in Epic.          Patient presents with:  Cough: worsening over past 3 days   Coronavirus Screening: exposed to someone with COVID on Friday ( 5 days ago).   Patient states she has been using her albuterol inhaler more than she typically would, but it is giving her  "some relief.  Patient states her cough is dry and spasmodic in nature.  Patient denies fever, chills, nausea vomiting or diarrhea.  Patient denies any other complaint.  Patient has received both vaccinations for Covid.  No previous Covid infection .        Cough  This is a new problem. The current episode started in the past 7 days. The problem has been gradually worsening. The problem occurs every few minutes. The cough is non-productive. Associated symptoms include headaches and wheezing. Pertinent negatives include no chest pain, chills, fever, nasal congestion, postnasal drip, rhinorrhea or shortness of breath. Exacerbated by: exertion , talking. She has tried a beta-agonist inhaler for the symptoms. The treatment provided mild relief. Her past medical history is significant for asthma, bronchitis and pneumonia.       Review of Systems   Constitutional: Negative for chills and fever.   HENT: Negative for postnasal drip and rhinorrhea.    Respiratory: Positive for cough and wheezing. Negative for sputum production and shortness of breath.    Cardiovascular: Negative for chest pain.   Gastrointestinal: Positive for diarrhea.   Neurological: Positive for headaches.   All other systems reviewed and are negative.             Objective     /82   Pulse 84   Temp 36.9 °C (98.4 °F) (Temporal)   Resp 16   Ht 1.651 m (5' 5\")   Wt 83.9 kg (185 lb)   SpO2 99%   BMI 30.79 kg/m²      Physical Exam  Vitals and nursing note reviewed.   Constitutional:       General: She is not in acute distress.     Appearance: Normal appearance. She is well-developed and normal weight. She is not ill-appearing or toxic-appearing.   HENT:      Head: Normocephalic and atraumatic.      Right Ear: Tympanic membrane normal.      Left Ear: Tympanic membrane normal.      Nose: Nose normal.      Mouth/Throat:      Lips: Pink.      Mouth: Mucous membranes are moist.      Pharynx: Oropharynx is clear. Uvula midline.   Eyes:      Extraocular " Movements: Extraocular movements intact.      Conjunctiva/sclera: Conjunctivae normal.      Pupils: Pupils are equal, round, and reactive to light.   Cardiovascular:      Rate and Rhythm: Normal rate and regular rhythm.      Pulses: Normal pulses.      Heart sounds: Normal heart sounds.   Pulmonary:      Effort: Pulmonary effort is normal.      Breath sounds: Wheezing present. No rhonchi or rales.      Comments: Dry spasmodic cough  Abdominal:      General: Bowel sounds are normal.      Palpations: Abdomen is soft.   Musculoskeletal:         General: Normal range of motion.      Cervical back: Normal range of motion and neck supple.   Skin:     General: Skin is warm and dry.      Capillary Refill: Capillary refill takes less than 2 seconds.   Neurological:      General: No focal deficit present.      Mental Status: She is alert and oriented to person, place, and time.      Cranial Nerves: No cranial nerve deficit.      Motor: Motor function is intact.      Coordination: Coordination is intact.      Gait: Gait normal.   Psychiatric:         Mood and Affect: Mood normal.                  Rapid covid: positive           Assessment & Plan                1. COVID-19 virus infection  POCT SARS-COV Antigen VJ (Symptomatic Only)    methylPREDNISolone (MEDROL DOSEPAK) 4 MG Tablet Therapy Pack   2. Cough  POCT SARS-COV Antigen VJ (Symptomatic Only)    methylPREDNISolone (MEDROL DOSEPAK) 4 MG Tablet Therapy Pack   3. Exposure to confirmed case of COVID-19  POCT SARS-COV Antigen VJ (Symptomatic Only)    methylPREDNISolone (MEDROL DOSEPAK) 4 MG Tablet Therapy Pack   4. Acute bronchospasm  POCT SARS-COV Antigen VJ (Symptomatic Only)    methylPREDNISolone (MEDROL DOSEPAK) 4 MG Tablet Therapy Pack     Per protocol for PUI/ISO patients, the patient was evaluated by me while I was wearing PPE.  Per CDC guidelines, patient has been instructed to self quarantine at home for 10 days from onset of symptoms.       PT can begin or  continue OTC medications, increase fluids and rest until symptoms improve.     PT to continue taking prescription medications as prescribed.      PT to begin prescription Medrol dose pack today as discussed for bronchospasm.     PT should follow up with PCP in 1-2 days for re-evaluation if symptoms have not improved.      Discussed red flags and reasons to return to UC or ED.      Pt and/or family verbalized understanding of diagnosis and follow up instructions and was offered informational handout on diagnosis.  PT discharged.     I have spent at least 30 minutes on the care of this patient.  This includes preparing for visit which includes review of previous visits if available in EMR, obtaining HPI, exam and evaluation of patient, ordering and independent interpretation of labs, imaging, tests, medical management, counseling, education and documentation.

## 2021-11-03 NOTE — TELEPHONE ENCOUNTER
1. Caller Name: Kristal English                        Call Back Number: [phone]      How would the patient prefer to be contacted with a response: Phone call OK to leave a detailed message    Patient called stating that her coworker recently tested positive for covid. She's been sick that last week and thought it was due to bronchitis. She's requesting to have a rapid covid test completed.

## 2021-11-04 NOTE — TELEPHONE ENCOUNTER
Called and left a voicemail asking for a call back so we can get a virtual appointment scheduled.

## 2021-11-04 NOTE — TELEPHONE ENCOUNTER
Called and left a voicemail asking for a call back to set up virtual appointment. Also advised that this appointment could be scheduled with the Scheduling Department.

## 2021-11-05 ENCOUNTER — TELEMEDICINE (OUTPATIENT)
Dept: MEDICAL GROUP | Facility: LAB | Age: 66
End: 2021-11-05
Payer: MEDICARE

## 2021-11-05 VITALS
TEMPERATURE: 98 F | SYSTOLIC BLOOD PRESSURE: 146 MMHG | HEIGHT: 65 IN | WEIGHT: 185 LBS | BODY MASS INDEX: 30.82 KG/M2 | DIASTOLIC BLOOD PRESSURE: 88 MMHG

## 2021-11-05 DIAGNOSIS — J20.8 ACUTE BRONCHITIS DUE TO 2019 NOVEL CORONAVIRUS: ICD-10-CM

## 2021-11-05 DIAGNOSIS — U07.1 ACUTE BRONCHITIS DUE TO 2019 NOVEL CORONAVIRUS: ICD-10-CM

## 2021-11-05 PROCEDURE — 99213 OFFICE O/P EST LOW 20 MIN: CPT | Mod: 95 | Performed by: NURSE PRACTITIONER

## 2021-11-05 ASSESSMENT — FIBROSIS 4 INDEX: FIB4 SCORE: 1.08

## 2021-11-05 NOTE — PROGRESS NOTES
Virtual Visit: Established Patient   This visit was conducted via Zoom using secure and encrypted videoconferencing technology.   The patient was in a private location in the state of [place].    The patient's identity was confirmed and verbal consent was obtained for this virtual visit.    Subjective:   CC:   Chief Complaint   Patient presents with   • Other     Discuss antibodies / COVID        Corrinne Lynne Cibulsky is a 66 y.o. female presenting for evaluation and management of:    Covid-19  Patient was diagnosed with Covid on 11/3/2021. Was vaccinated in March.  Symtoms started on Tuesday with cough, headache, wheezing, congestion, sinus pain/pressure. She found out that she was exposed that same day. She feels like her symptoms are worsening. She made an appointment today to discuss monoclonal antibodies.    ROS   As documented in history of present illness above    Current medicines (including changes today)  Current Outpatient Medications   Medication Sig Dispense Refill   • acetaminophen (TYLENOL) 325 MG Tab as needed.     • SUMAtriptan (IMITREX) 50 MG Tab sumatriptan 50 mg tablet   Take by oral route as needed.     • levothyroxine (SYNTHROID) 137 MCG Tab Take 137 mcg by mouth every day.     • methylPREDNISolone (MEDROL DOSEPAK) 4 MG Tablet Therapy Pack Follow schedule on package instructions. 21 Tablet 0   • celecoxib (CELEBREX) 200 MG Cap TAKE 1 CAPSULE BY MOUTH TWICE A DAY 60 Capsule 9   • omeprazole (PRILOSEC) 20 MG delayed-release capsule      • Fremanezumab-vfrm 225 MG/1.5ML Solution Auto-injector Inject 225 mg under the skin every 4 weeks. 1.5 mL 12   • escitalopram (LEXAPRO) 10 MG Tab TAKE ONE TABLET BY MOUTH DAILY (LEXAPRO) 90 tablet 1   • albuterol 108 (90 Base) MCG/ACT Aero Soln inhalation aerosol Inhale 2 Puffs every four hours as needed for Shortness of Breath (cough and wheezing). 1 Each 3   • SYNTHROID 137 MCG Tab Take 1 tablet by mouth every morning on an empty stomach. 90 tablet 3   •  "rizatriptan (MAXALT) 10 MG tablet Take 1/2  tablet by mouth at onset of headache, may repeat dose in 2 hours if unrelieved.  Do not exceed more than 1 tablets in 24 hours. 9 Tab 11   • acyclovir (ZOVIRAX) 400 MG tablet TAKE ONE TABLET BY MOUTH THREE TIMES A DAY 30 Tab 3     No current facility-administered medications for this visit.       Patient Active Problem List    Diagnosis Date Noted   • COVID-19 virus detected 11/08/2021   • Carpal tunnel syndrome on left 10/13/2021   • Bilateral carpal tunnel syndrome 08/04/2021   • 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   • Thyroid goiter 04/19/2021   • Hoarseness or changing voice 01/11/2021   • Episodic altered awareness 10/13/2020   • Memory change 10/13/2020   • Current mild episode of major depressive disorder without prior episode (HCC) 08/27/2020   • Memory changes 08/27/2020   • Lumbar radiculopathy 05/26/2020   • Mild intermittent asthma without complication 05/14/2020   • Dense breast tissue on mammogram 05/14/2020   • Spondylolisthesis of lumbar region 01/07/2020   • Peptic ulcer 08/14/2019   • Epigastric pain 10/09/2018   • Obesity (BMI 30-39.9) 05/09/2017   • Endometriosis of vagina 09/19/2016   • Lymphocytic thyroiditis 09/19/2016   • Renal colic 09/19/2016   • Vitamin D deficiency 09/19/2016   • Dyslipidemia 09/19/2016   • Migraine without aura and without status migrainosus, not intractable 09/19/2016   • HSV infection 09/19/2016   • Knee osteoarthritis 09/12/2014   • Hypokalemia 07/11/2014   • S/P rotator cuff repair 02/27/2014   • Shoulder impingement 01/16/2014        Objective:   /88   Temp 36.7 °C (98 °F)   Ht 1.651 m (5' 5\")   Wt 83.9 kg (185 lb)   BMI 30.79 kg/m²     Physical Exam:  Constitutional: Alert, no distress, well-groomed.  Skin: No rashes in visible areas.  Eye: Round. Conjunctiva clear, lids normal. No icterus.   ENMT: Lips pink without lesions, good dentition, moist mucous membranes. Phonation normal.  Neck: No masses, no " thyromegaly. Moves freely without pain.  Respiratory: Unlabored respiratory effort, no cough or audible wheeze  Psych: Alert and oriented x3, normal affect and mood.     Assessment and Plan:   The following treatment plan was discussed:     1. Acute bronchitis due to 2019 novel coronavirus  Patient is a candidate for REGEN-COV therapy given age and comorbidities. Discussed the risks/benefits of treatment. Discussed red flag symptoms and ER precautions with patient. Discussed therapeutic alternatives to Casirivimab & Imdevimab and the risks and benefits of those alternatives. Patient informed that Casirivimab & Imdevimab is not FDA approved, but that it is authorized for use under emergency by the FDA. Informed of the known risks and benefits of Casirivimab & Imdevimab and discussed the extent to which such risks and benefits are unknown. Patient will consider treatment and call if she consents.     Follow-up: Return if symptoms worsen or fail to improve.     My total time spent caring for the patient on the day of the encounter was 20 minutes.   This does not include time spent on separately billable procedures/tests.

## 2021-11-08 DIAGNOSIS — U07.1 COVID-19 VIRUS DETECTED: ICD-10-CM

## 2021-11-08 RX ORDER — CASIRIVIMAB 1332 MG/11.1ML
300 INJECTION, SOLUTION, CONCENTRATE INTRAVENOUS ONCE
Status: CANCELLED
Start: 2021-11-08 | End: 2021-11-08

## 2021-11-08 RX ORDER — IMDEVIMAB 1332 MG/11.1ML
300 INJECTION, SOLUTION, CONCENTRATE INTRAVENOUS ONCE
Status: CANCELLED
Start: 2021-11-08 | End: 2021-11-08

## 2021-11-10 ENCOUNTER — NON-PROVIDER VISIT (OUTPATIENT)
Dept: ONCOLOGY | Facility: MEDICAL CENTER | Age: 66
End: 2021-11-10
Attending: NURSE PRACTITIONER
Payer: MEDICARE

## 2021-11-10 VITALS
SYSTOLIC BLOOD PRESSURE: 128 MMHG | RESPIRATION RATE: 18 BRPM | DIASTOLIC BLOOD PRESSURE: 67 MMHG | TEMPERATURE: 98.8 F | HEART RATE: 62 BPM | OXYGEN SATURATION: 99 %

## 2021-11-10 DIAGNOSIS — U07.1 COVID-19 VIRUS DETECTED: ICD-10-CM

## 2021-11-10 PROCEDURE — M0243 CASIRIVI AND IMDEVI INFUSION: HCPCS

## 2021-11-10 PROCEDURE — 700111 HCHG RX REV CODE 636 W/ 250 OVERRIDE (IP): Performed by: NURSE PRACTITIONER

## 2021-11-10 RX ORDER — IMDEVIMAB 1332 MG/11.1ML
300 INJECTION, SOLUTION, CONCENTRATE INTRAVENOUS ONCE
Status: CANCELLED
Start: 2021-11-10 | End: 2021-11-10

## 2021-11-10 RX ORDER — CASIRIVIMAB 1332 MG/11.1ML
300 INJECTION, SOLUTION, CONCENTRATE INTRAVENOUS ONCE
Status: CANCELLED
Start: 2021-11-10 | End: 2021-11-10

## 2021-11-10 RX ORDER — CASIRIVIMAB 1332 MG/11.1ML
300 INJECTION, SOLUTION, CONCENTRATE INTRAVENOUS ONCE
Status: COMPLETED | OUTPATIENT
Start: 2021-11-10 | End: 2021-11-10

## 2021-11-10 RX ORDER — IMDEVIMAB 1332 MG/11.1ML
300 INJECTION, SOLUTION, CONCENTRATE INTRAVENOUS ONCE
Status: COMPLETED | OUTPATIENT
Start: 2021-11-10 | End: 2021-11-10

## 2021-11-10 RX ADMIN — CASIRIVIMAB 300 MG: 1332 INJECTION, SOLUTION, CONCENTRATE INTRAVENOUS at 09:02

## 2021-11-10 RX ADMIN — IMDEVIMAB 300 MG: 1332 INJECTION, SOLUTION, CONCENTRATE INTRAVENOUS at 09:02

## 2021-11-10 NOTE — PROGRESS NOTES
Patient ambulated into clinic with spouse on own energy. Verified name and . Explained POC, all questions answered.    Patient allowed to use albuterol inhaler for asthma., O2sat 99% HR 91 prior to injection

## 2021-11-10 NOTE — PROGRESS NOTES
Last VS check complete. Patient denies any symtoms, injection sites inspected and WNL. Patient escorted out with spouse. ER precautions provided

## 2021-11-11 ENCOUNTER — PATIENT MESSAGE (OUTPATIENT)
Dept: MEDICAL GROUP | Facility: LAB | Age: 66
End: 2021-11-11

## 2021-11-24 ENCOUNTER — DOCUMENTATION (OUTPATIENT)
Dept: VASCULAR LAB | Facility: MEDICAL CENTER | Age: 66
End: 2021-11-24

## 2021-11-24 ENCOUNTER — TELEPHONE (OUTPATIENT)
Dept: MEDICAL GROUP | Facility: LAB | Age: 66
End: 2021-11-24

## 2021-11-24 ENCOUNTER — ANTICOAGULATION VISIT (OUTPATIENT)
Dept: VASCULAR LAB | Facility: MEDICAL CENTER | Age: 66
End: 2021-11-24
Attending: INTERNAL MEDICINE
Payer: MEDICARE

## 2021-11-24 ENCOUNTER — HOSPITAL ENCOUNTER (OUTPATIENT)
Dept: RADIOLOGY | Facility: MEDICAL CENTER | Age: 66
End: 2021-11-24
Attending: NURSE PRACTITIONER
Payer: MEDICARE

## 2021-11-24 ENCOUNTER — OFFICE VISIT (OUTPATIENT)
Dept: URGENT CARE | Facility: CLINIC | Age: 66
End: 2021-11-24
Payer: MEDICARE

## 2021-11-24 VITALS
HEART RATE: 80 BPM | HEIGHT: 65 IN | BODY MASS INDEX: 30.99 KG/M2 | OXYGEN SATURATION: 97 % | RESPIRATION RATE: 20 BRPM | WEIGHT: 186 LBS | TEMPERATURE: 97.9 F | SYSTOLIC BLOOD PRESSURE: 154 MMHG | DIASTOLIC BLOOD PRESSURE: 78 MMHG

## 2021-11-24 DIAGNOSIS — I82.401 ACUTE DEEP VEIN THROMBOSIS (DVT) OF RIGHT LOWER EXTREMITY, UNSPECIFIED VEIN (HCC): ICD-10-CM

## 2021-11-24 DIAGNOSIS — I82.402 ACUTE DEEP VEIN THROMBOSIS (DVT) OF LEFT LOWER EXTREMITY, UNSPECIFIED VEIN (HCC): ICD-10-CM

## 2021-11-24 DIAGNOSIS — I82.402 ACUTE DEEP VEIN THROMBOSIS (DVT) OF LEFT LOWER EXTREMITY, UNSPECIFIED VEIN (HCC): Primary | ICD-10-CM

## 2021-11-24 DIAGNOSIS — M79.605 ACUTE LEG PAIN, LEFT: ICD-10-CM

## 2021-11-24 DIAGNOSIS — I82.492 ACUTE DEEP VEIN THROMBOSIS (DVT) OF OTHER SPECIFIED VEIN OF LEFT LOWER EXTREMITY (HCC): ICD-10-CM

## 2021-11-24 PROBLEM — I82.409 DEEP VEIN THROMBOSIS (HCC): Status: ACTIVE | Noted: 2021-11-24

## 2021-11-24 PROCEDURE — 93971 EXTREMITY STUDY: CPT | Mod: LT

## 2021-11-24 PROCEDURE — 93971 EXTREMITY STUDY: CPT | Mod: 26,LT | Performed by: INTERNAL MEDICINE

## 2021-11-24 PROCEDURE — 99213 OFFICE O/P EST LOW 20 MIN: CPT | Mod: 25

## 2021-11-24 PROCEDURE — 99213 OFFICE O/P EST LOW 20 MIN: CPT | Performed by: NURSE PRACTITIONER

## 2021-11-24 ASSESSMENT — ENCOUNTER SYMPTOMS: LEG PAIN: 1

## 2021-11-24 ASSESSMENT — FIBROSIS 4 INDEX: FIB4 SCORE: 1.08

## 2021-11-24 NOTE — PROGRESS NOTES
Received call from Cathey Hamman, APRN, [facility] urgent care provider. Pt has a positive LLE DVT.    Placed anticoagulation referral for Karen JERNIGAN to cosign    Aislinn Appiah PharmD

## 2021-11-24 NOTE — PROGRESS NOTES
Initial anticoagulation clinic note and most recent urgent care note reviewed.    Patient started on anticoagulation for popliteal DVT, apparently associated with recent Covid infection.    Pending further recommendations, we will continue with 3 months of oral anticoagulation then will discontinue if okay with PCP    Will defer any indicated age appropriate screening for occult malignancy to pcp.    Michael Bloch, MD  Anticoagulation Clinic    Cc:      E Smith

## 2021-11-24 NOTE — PROGRESS NOTES
Subjective     Mónica English is a 66 y.o. female who presents with Leg Pain (left lower leg swelling and redness for 4 days, hurts at night, 3 lb unexplained weight gain. Pain is in the calf.)    Past Medical History:   Diagnosis Date   • Anesthesia     wakes up manic   • Arthritis    • ASTHMA    • Asthma 2014   • Breath shortness     asthma related   • Chest pain 7/10/2014   • Endometriosis of vagina 2016    Overview:  AREA NOT SPECIFIED    • Follicular thyroid cancer (HCC) 2021   • H/O migraine 2014   • HSV infection 2016   • Hyperlipidemia 2016   • Hyperthyroidism    • Knee osteoarthritis 2014   • Lymphocytic thyroiditis 2016   • Migraine without aura and without status migrainosus, not intractable 2016   • Psychiatric problem    • Renal colic 2016   • Renal disorder    • Rotator cuff tear 2014   • Vitamin D deficiency 2016     Social History     Socioeconomic History   • Marital status:      Spouse name: Not on file   • Number of children: Not on file   • Years of education: Not on file   • Highest education level: Not on file   Occupational History   • Not on file   Tobacco Use   • Smoking status: Former Smoker     Packs/day: 0.25     Years: 10.00     Pack years: 2.50     Types: Cigarettes     Quit date: 1980     Years since quittin.0   • Smokeless tobacco: Never Used   Vaping Use   • Vaping Use: Never used   Substance and Sexual Activity   • Alcohol use: Yes     Alcohol/week: 2.5 oz     Types: 5 Standard drinks or equivalent per week     Comment: occas   • Drug use: No   • Sexual activity: Yes     Partners: Male   Other Topics Concern   • Not on file   Social History Narrative   • Not on file     Social Determinants of Health     Financial Resource Strain:    • Difficulty of Paying Living Expenses: Not on file   Food Insecurity:    • Worried About Running Out of Food in the Last Year: Not on file   • Ran Out of Food in the Last Year:  Not on file   Transportation Needs:    • Lack of Transportation (Medical): Not on file   • Lack of Transportation (Non-Medical): Not on file   Physical Activity:    • Days of Exercise per Week: Not on file   • Minutes of Exercise per Session: Not on file   Stress:    • Feeling of Stress : Not on file   Social Connections:    • Frequency of Communication with Friends and Family: Not on file   • Frequency of Social Gatherings with Friends and Family: Not on file   • Attends Alevism Services: Not on file   • Active Member of Clubs or Organizations: Not on file   • Attends Club or Organization Meetings: Not on file   • Marital Status: Not on file   Intimate Partner Violence:    • Fear of Current or Ex-Partner: Not on file   • Emotionally Abused: Not on file   • Physically Abused: Not on file   • Sexually Abused: Not on file   Housing Stability:    • Unable to Pay for Housing in the Last Year: Not on file   • Number of Places Lived in the Last Year: Not on file   • Unstable Housing in the Last Year: Not on file     Family History   Problem Relation Age of Onset   • Diabetes Mother    • Arthritis Mother    • Lung Disease Mother         COPD   • Cancer Mother         cervical   • Heart Disease Father    • Alcohol/Drug Father    • Cancer Sister         thyroid   • Heart Disease Brother        Allergies: Gluten, Morphine, Pcn [penicillins], Percocet [oxycodone-acetaminophen], Gluten meal, and Oxycodone-acetaminophen    Patient is a 66-year-old female who presents today with complaint of acute pain and swelling to the posterior aspect of the left leg.  Symptoms started over the last 24 hours.  Patient is concerned because she did take monoclonal antibody treatment for Covid and is concerned that she may have developed a blood clot.  No prior history of DVT.  She does endorse some mild shortness of breath, but states she has a history of asthma.        Leg Pain  This is a new problem. The current episode started in the past  "7 days. The problem occurs constantly. The problem has been unchanged. Associated symptoms comments: Leg pain and swelling. Nothing aggravates the symptoms. She has tried nothing for the symptoms. The treatment provided no relief.       Review of Systems   Musculoskeletal:        Leg pain and swelling   All other systems reviewed and are negative.             Objective     /78 (BP Location: Right arm, Patient Position: Sitting, BP Cuff Size: Adult)   Pulse 80   Temp 36.6 °C (97.9 °F)   Resp 20   Ht 1.651 m (5' 5\")   Wt 84.4 kg (186 lb)   SpO2 97%   BMI 30.95 kg/m²      Physical Exam  Vitals reviewed.   Constitutional:       Appearance: Normal appearance.   Skin:     General: Skin is warm and dry.   Neurological:      General: No focal deficit present.      Mental Status: She is alert and oriented to person, place, and time.   Psychiatric:         Mood and Affect: Mood normal.         Behavior: Behavior normal.       Mild point tenderness to the posterior aspect of the left leg, more so towards the distal end.  No point tenderness or swelling over the Achilles tendon.  Mild point tenderness to the medial aspect of the left lower leg as well.  No point tenderness to the anterior leg.           Received a call report from the technician and the vascular lab who did the patient's ultrasound.  I was advised that patient does have a thrombus.  I consulted the office of vascular medicine and they will see the patient right now as she is in their waiting room after having her ultrasound done.  I contacted the patient by phone and advised her to wait in the waiting room and that she will be seen.  Patient verbalized understanding, and agreement with plan of care.  No further questions at this time           Assessment & Plan   Left leg pain  Left leg thrombus    See note above  Patient will be seen today by the [facility] for further evaluation and treatment.     There are no diagnoses linked to this " encounter.

## 2021-11-24 NOTE — TELEPHONE ENCOUNTER
1. Caller Name: Mónica                        Call Back Number: [phone]      How would the patient prefer to be contacted with a response: Phone call do NOT leave a detailed message    2. What are the patient's symptoms (location & severity)? Ankle swelling and pain. Mónica wants to rule out a blood clot.    3. Is this a new symptom No    4. When did it start? A few weeks now    5. Action taken per Active Symptom Guide: Urgent Care recommended   Mónica states she is planning on going out of town and was hoping for an imaging order.  I told her that she would need to be seen for that, but for the quickest way to confirm if she has a clot is to go to the Urgent Care or ER.    6. Patient agrees to recommended action per Active Symptom Escalation Protocol.

## 2021-11-24 NOTE — PROGRESS NOTES
NEW DOAC   .  Anticoagulation Summary  As of 11/24/2021    INR goal:     TTR:  --   INR used for dosing:  No new INR was available at the time of this encounter.   Warfarin maintenance plan:  No maintenance plan   Next INR check:  12/6/2021   Target end date:  2/24/2022    Indications    Deep vein thrombosis (HCC) [I82.409]             Anticoagulation Episode Summary     INR check location:      Preferred lab:      Send INR reminders to:      Comments:  Eliquis      Anticoagulation Care Providers     Provider Role Specialty Phone number    [facility] Responsible  [phone]        Anticoagulation Patient Findings      PCP: Silvia Carrasco M.D.  Cardiologist: none  Dx: Acute LLE DVT  CHADSVASC = none  HAS-BLED = 1 (age)  Target End Date = 2/24/22    Pt Hx: Pt with recent COVID-19 infection 3 weeks ago developed an acute LLE DVT. Pt mentioned her niece had a PE. This is pt's first VTE likely provoked by COVID-19 infection.    Labs:  Lab Results   Component Value Date/Time    WBC 4.7 (L) 04/07/2021 07:23 AM    RBC 4.98 04/07/2021 07:23 AM    HEMOGLOBIN 14.3 04/07/2021 07:23 AM    HEMATOCRIT 43.6 04/07/2021 07:23 AM    MCV 87.6 04/07/2021 07:23 AM    MCH 28.7 04/07/2021 07:23 AM    MCHC 32.8 (L) 04/07/2021 07:23 AM    MPV 9.6 04/07/2021 07:23 AM    NEUTSPOLYS 44.80 04/07/2021 07:23 AM    LYMPHOCYTES 42.50 (H) 04/07/2021 07:23 AM    MONOCYTES 8.30 04/07/2021 07:23 AM    EOSINOPHILS 3.80 04/07/2021 07:23 AM    BASOPHILS 0.40 04/07/2021 07:23 AM      Lab Results   Component Value Date/Time    SODIUM 143 10/25/2021 06:20 AM    SODIUM 141 04/07/2021 07:23 AM    POTASSIUM 4.4 10/25/2021 06:20 AM    POTASSIUM 4.5 04/07/2021 07:23 AM    CHLORIDE 108 (H) 10/25/2021 06:20 AM    CHLORIDE 108 04/07/2021 07:23 AM    CO2 23 10/25/2021 06:20 AM    CO2 24 04/07/2021 07:23 AM    GLUCOSE 98 10/25/2021 06:20 AM    GLUCOSE 90 04/07/2021 07:23 AM    BUN 18 10/25/2021 06:20 AM    BUN 17 04/07/2021 07:23 AM    CREATININE  0.53 (L) 10/25/2021 06:20 AM    CREATININE 0.68 04/07/2021 07:23 AM    BUNCREATRAT 34 (H) 10/25/2021 06:20 AM          Pt is new to Eliquis and new to [facility]. Discussed:   · Indication for DOAC therapy.  · Importance of monitoring and compliance.   · Monitoring parameters, signs and symptoms of bleeding or clotting.  · DOAC therapy, side effects, potential DDIs, OTC medications  · Pt on NSAID therapy with celecoxib - pt to STOP celecoxib  · Lifestyle safety, ie smoking, ETOH, hobby safety, fall safety/prevention  · Procedures for missed doses or suspected missed doses, surgeries/procedures, travel, dental work, any medication changes     Start with Eliquis 10mg taken 2 times a day for 1 week and then decrease to 5mg taken 2 times daily thereafter. Pt will transition to 5 mg BID dose on 12/1/21    DOAC affordable = pending    Samples provided: yes - enough for loading dose + 1 week of maintenance dose    Labs to be completed prior to next f/u - CBC, CMP    F/U - 1.5 weeks in [place] Appiah, PharmD      Added [facility] to care team   Send to Bloch

## 2021-11-26 ENCOUNTER — TELEPHONE (OUTPATIENT)
Dept: URGENT CARE | Facility: CLINIC | Age: 66
End: 2021-11-26

## 2021-12-06 ENCOUNTER — ANTICOAGULATION VISIT (OUTPATIENT)
Dept: MEDICAL GROUP | Facility: MEDICAL CENTER | Age: 66
End: 2021-12-06
Payer: MEDICARE

## 2021-12-06 VITALS — DIASTOLIC BLOOD PRESSURE: 59 MMHG | SYSTOLIC BLOOD PRESSURE: 112 MMHG | HEART RATE: 82 BPM

## 2021-12-06 DIAGNOSIS — Z79.01 CHRONIC ANTICOAGULATION: Primary | ICD-10-CM

## 2021-12-06 PROCEDURE — 99211 OFF/OP EST MAY X REQ PHY/QHP: CPT | Performed by: INTERNAL MEDICINE

## 2021-12-06 NOTE — PROGRESS NOTES
Target end date:2/24/22     Indication: DVT     Drug: Eliquis 5 mg BID         Health Status Since Last Assessment   Patient denies any new relevant medical problems, ED visits or hospitalizations   Patient denies any embolic events (stroke/tia/systemic embolism)    Adherence with DOAC Therapy   Pt has 0 missed any doses in the average week    Bleeding Risk Assessment     Denies Epistaxis   Pt denies any excessive or unusual bleeding/hematomas.  Pt denies any GI bleeds or hematemesis.  Pt denies any concerning daily headache or sub dural hematoma symptoms.     Pt denies any hematuria    Latest Hemoglobin 14.3   ETOH overuse denies     Creatinine Clearance/Renal Function     Latest ClCr >60    Hepatic function   Latest LFTs WNL   Pt denies any history of liver dysfunction      Drug Interactions   Platelets: 249   ASA/other antiplatelets none   NSAID none   Other drug interactions none    Verified no anticonvulsant or azole therapy, education provided for future use.     Examination   Blood Pressure WNl   Symptomatic hypotension none   Significant gait impairment/imbalance/high risk for falls? none    Final Assessment and Recommendations:   Patient appears stable from the anticoagulation standpoint.     Benefits of continued DOAC therapy outweigh risks for this patient   Recommend pt continue with current DOAC therapy    DOAC is  affordable     Other Actions: cmp/ cbc hemogram ordered prior to next visit    Follow up:   Will follow up with patient 3  months.      Shelby Ramon, PharmD

## 2022-01-01 DIAGNOSIS — F32.0 CURRENT MILD EPISODE OF MAJOR DEPRESSIVE DISORDER WITHOUT PRIOR EPISODE (HCC): ICD-10-CM

## 2022-01-03 RX ORDER — ESCITALOPRAM OXALATE 10 MG/1
TABLET ORAL
Qty: 90 TABLET | Refills: 0 | Status: SHIPPED | OUTPATIENT
Start: 2022-01-03 | End: 2022-02-23 | Stop reason: SDUPTHER

## 2022-02-01 ENCOUNTER — PATIENT MESSAGE (OUTPATIENT)
Dept: HEALTH INFORMATION MANAGEMENT | Facility: OTHER | Age: 67
End: 2022-02-01
Payer: MEDICARE

## 2022-02-23 ENCOUNTER — OFFICE VISIT (OUTPATIENT)
Dept: MEDICAL GROUP | Facility: LAB | Age: 67
End: 2022-02-23
Payer: MEDICARE

## 2022-02-23 VITALS
OXYGEN SATURATION: 94 % | HEART RATE: 74 BPM | WEIGHT: 190.6 LBS | RESPIRATION RATE: 14 BRPM | BODY MASS INDEX: 31.75 KG/M2 | SYSTOLIC BLOOD PRESSURE: 104 MMHG | TEMPERATURE: 97.3 F | HEIGHT: 65 IN | DIASTOLIC BLOOD PRESSURE: 66 MMHG

## 2022-02-23 DIAGNOSIS — E78.5 DYSLIPIDEMIA: ICD-10-CM

## 2022-02-23 DIAGNOSIS — Z00.00 ENCOUNTER FOR MEDICARE ANNUAL WELLNESS EXAM: ICD-10-CM

## 2022-02-23 DIAGNOSIS — G43.009 MIGRAINE WITHOUT AURA AND WITHOUT STATUS MIGRAINOSUS, NOT INTRACTABLE: ICD-10-CM

## 2022-02-23 DIAGNOSIS — Z12.31 ENCOUNTER FOR SCREENING MAMMOGRAM FOR BREAST CANCER: ICD-10-CM

## 2022-02-23 DIAGNOSIS — E55.9 VITAMIN D DEFICIENCY: ICD-10-CM

## 2022-02-23 DIAGNOSIS — F32.0 CURRENT MILD EPISODE OF MAJOR DEPRESSIVE DISORDER WITHOUT PRIOR EPISODE (HCC): ICD-10-CM

## 2022-02-23 DIAGNOSIS — R10.11 RUQ PAIN: ICD-10-CM

## 2022-02-23 DIAGNOSIS — Z78.0 ENCOUNTER FOR OSTEOPOROSIS SCREENING IN ASYMPTOMATIC POSTMENOPAUSAL PATIENT: ICD-10-CM

## 2022-02-23 DIAGNOSIS — E06.3 HASHIMOTO'S THYROIDITIS: ICD-10-CM

## 2022-02-23 DIAGNOSIS — Z86.718 HISTORY OF DVT (DEEP VEIN THROMBOSIS): ICD-10-CM

## 2022-02-23 DIAGNOSIS — Z11.59 NEED FOR HEPATITIS C SCREENING TEST: ICD-10-CM

## 2022-02-23 DIAGNOSIS — E66.9 OBESITY (BMI 30-39.9): ICD-10-CM

## 2022-02-23 DIAGNOSIS — M79.675 PAIN OF TOE OF LEFT FOOT: ICD-10-CM

## 2022-02-23 DIAGNOSIS — Z13.820 ENCOUNTER FOR OSTEOPOROSIS SCREENING IN ASYMPTOMATIC POSTMENOPAUSAL PATIENT: ICD-10-CM

## 2022-02-23 DIAGNOSIS — M43.16 SPONDYLOLISTHESIS OF LUMBAR REGION: ICD-10-CM

## 2022-02-23 DIAGNOSIS — E03.9 ACQUIRED HYPOTHYROIDISM: ICD-10-CM

## 2022-02-23 DIAGNOSIS — C73 FOLLICULAR THYROID CANCER (HCC): ICD-10-CM

## 2022-02-23 DIAGNOSIS — B00.9 HSV INFECTION: ICD-10-CM

## 2022-02-23 PROBLEM — G56.02 CARPAL TUNNEL SYNDROME ON LEFT: Status: RESOLVED | Noted: 2021-10-13 | Resolved: 2022-02-23

## 2022-02-23 PROBLEM — Z86.16 HISTORY OF COVID-19: Status: ACTIVE | Noted: 2021-11-08

## 2022-02-23 PROBLEM — K27.9 PEPTIC ULCER: Status: RESOLVED | Noted: 2019-08-14 | Resolved: 2022-02-23

## 2022-02-23 PROBLEM — R10.13 EPIGASTRIC PAIN: Status: RESOLVED | Noted: 2018-10-09 | Resolved: 2022-02-23

## 2022-02-23 PROBLEM — R41.3 MEMORY CHANGES: Status: RESOLVED | Noted: 2020-08-27 | Resolved: 2022-02-23

## 2022-02-23 PROCEDURE — 99214 OFFICE O/P EST MOD 30 MIN: CPT | Performed by: NURSE PRACTITIONER

## 2022-02-23 RX ORDER — LEVOTHYROXINE SODIUM 137 MCG
TABLET ORAL
Qty: 90 TABLET | Refills: 3 | Status: SHIPPED | OUTPATIENT
Start: 2022-02-23 | End: 2023-02-21

## 2022-02-23 RX ORDER — MONTELUKAST SODIUM 4 MG/1
TABLET, CHEWABLE ORAL
COMMUNITY
Start: 2022-02-15 | End: 2022-06-03

## 2022-02-23 RX ORDER — ESCITALOPRAM OXALATE 10 MG/1
10 TABLET ORAL DAILY
Qty: 90 TABLET | Refills: 3 | Status: SHIPPED | OUTPATIENT
Start: 2022-02-23 | End: 2022-07-21

## 2022-02-23 RX ORDER — ACYCLOVIR 400 MG/1
400 TABLET ORAL 3 TIMES DAILY
Qty: 30 TABLET | Refills: 3 | Status: SHIPPED | OUTPATIENT
Start: 2022-02-23 | End: 2022-11-26

## 2022-02-23 ASSESSMENT — FIBROSIS 4 INDEX: FIB4 SCORE: 1.09

## 2022-02-23 NOTE — TELEPHONE ENCOUNTER
Received request via: Pharmacy    Was the patient seen in the last year in this department? Yes  LOV 11/05/2021 - Telemedicine  Does the patient have an active prescription (recently filled or refills available) for medication(s) requested? No

## 2022-02-23 NOTE — PROGRESS NOTES
Subjective:     Corrinne Lynne Cibulsky is a 67 y.o. female here today for Annual Health Assessment.    Patient doing well.  Taking all medications as prescribed.  She does have some pain on the toe of her left foot, and is also having RUQ pain for the last several days.  Has had gallbladder removed. She would like some imaging at this time.     Health Maintenance Summary          Ordered - HEPATITIS C SCREENING (Once) Ordered on 3/14/2022    No completion history exists for this topic.          Scheduled - BONE DENSITY (Every 2 Years) Scheduled for 3/22/2022    05/23/2017  DS-BONE DENSITY STUDY (DEXA)    05/23/2017  DS-BONE DENSITY STUDY (DEXA)          Overdue - IMM PNEUMOCOCCAL VACCINE: 65+ Years (2 of 2 - PPSV23) Overdue since 2/6/2021    10/25/2015  Imm Admin: Pneumococcal Conjugate Vaccine (Prevnar/PCV-13)    07/11/2005  Imm Admin: Pneumococcal polysaccharide vaccine (PPSV-23)          Scheduled - MAMMOGRAM (Yearly) Scheduled for 3/22/2022    08/15/2020  Done    06/25/2020  MA-DIAGNOSTIC MAMMO LEFT W/TOMOSYNTHESIS W/O CAD    06/17/2020  MA-SCREENING MAMMO BILAT W/TOMOSYNTHESIS W/CAD    02/25/2019  MA-SCREENING MAMMO BILAT W/TOMOSYNTHESIS W/CAD    04/17/2017  MA-MAMMO SCREENING BILAT W/KIMO W/CAD          Overdue - COVID-19 Vaccine (3 - Booster for Pfizer series) Overdue since 8/22/2021 03/22/2021  Imm Admin: Pfizer SARS-CoV-2 Vaccine    03/01/2021  Imm Admin: Pfizer SARS-CoV-2 Vaccine          PAP SMEAR (Every 3 Years) Tentatively due on 9/23/2023 09/23/2020  THINPREP PAP WITH HPV    09/23/2020  Pathology Gynecology Specimen    05/09/2017  THINPREP PAP WITH HPV    05/09/2017  PATHOLOGY GYN SPECIMEN          IMM DTaP/Tdap/Td Vaccine (2 - Td or Tdap) Next due on 8/14/2025 08/14/2015  Imm Admin: Tdap Vaccine          COLORECTAL CANCER SCREENING (COLONOSCOPY - Every 10 Years) Next due on 12/19/2027 09/18/2019  OCCULT BLOOD FECES IMMUNOASSAY    12/19/2017  REFERRAL TO GI FOR COLONOSCOPY          IMM  ZOSTER VACCINES (Series Information) Completed    01/11/2021  Imm Admin: Zoster Vaccine Recombinant (RZV) (SHINGRIX)    09/23/2020  Imm Admin: Zoster Vaccine Recombinant (RZV) (SHINGRIX)          IMM INFLUENZA (Series Information) Completed    10/26/2021  Imm Admin: Influenza Vaccine Adult HD    09/23/2020  Imm Admin: Influenza Vaccine Quad Recombinant    10/09/2019  Imm Admin: Influenza Vaccine Quad Recombinant    10/01/2018  Imm Admin: Influenza Vaccine Quad Recombinant    12/06/2017  Imm Admin: Influenza (IM) Preservative Free - HISTORICAL DATA    Only the first 5 history entries have been loaded, but more history exists.          Annual Wellness Visit  Completed    02/23/2022  Visit Dx: Encounter for Medicare annual wellness exam          IMM HEP B VACCINE (Series Information) Aged Out    No completion history exists for this topic.          IMM MENINGOCOCCAL VACCINE (MCV4) (Series Information) Aged Out    No completion history exists for this topic.              Annual Health Assessment Questions:     1.  Are you currently engaging in any exercise or physical activity? Yes    2.  How would you describe your mood or emotional well-being today? good    3.  Have you had any falls in the last year? No    4.  Have you noticed any problems with your balance or had difficulty walking? Yes    5.  In the last six months have you experienced any leakage of urine? No    6. DPA/Advanced Directive: Patient has Advanced Directive on file.     Current medicines (including changes today)  Current Outpatient Medications   Medication Sig Dispense Refill   • colestipol (COLESTID) 1 GM Tab      • acyclovir (ZOVIRAX) 400 MG tablet Take 1 Tablet by mouth 3 times a day. 30 Tablet 3   • escitalopram (LEXAPRO) 10 MG Tab Take 1 Tablet by mouth every day. 90 Tablet 3   • SYNTHROID 137 MCG Tab TAKE 1 TABLET BY MOUTH EVERY DAY IN THE MORNING ON AN EMPTY STOMACH 90 Tablet 3   • acetaminophen (TYLENOL) 325 MG Tab as needed.     • SUMAtriptan  (IMITREX) 50 MG Tab sumatriptan 50 mg tablet   Take by oral route as needed.     • omeprazole (PRILOSEC) 20 MG delayed-release capsule      • Fremanezumab-vfrm 225 MG/1.5ML Solution Auto-injector Inject 225 mg under the skin every 4 weeks. 1.5 mL 12   • albuterol 108 (90 Base) MCG/ACT Aero Soln inhalation aerosol Inhale 2 Puffs every four hours as needed for Shortness of Breath (cough and wheezing). 1 Each 3   • rizatriptan (MAXALT) 10 MG tablet Take 1/2  tablet by mouth at onset of headache, may repeat dose in 2 hours if unrelieved.  Do not exceed more than 1 tablets in 24 hours. 9 Tab 11     No current facility-administered medications for this visit.       She  has a past medical history of Anesthesia, Arthritis, ASTHMA, Asthma (7/11/2014), Breath shortness, Carpal tunnel syndrome on left (10/13/2021), Chest pain (7/10/2014), Endometriosis of vagina (9/19/2016), Epigastric pain (10/9/2018), Episodic altered awareness (10/13/2020), Follicular thyroid cancer (HCC) (8/4/2021), H/O migraine (7/11/2014), Hoarseness or changing voice (1/11/2021), HSV infection (9/19/2016), Hyperlipidemia (9/19/2016), Hyperthyroidism, Hypokalemia (7/11/2014), Knee osteoarthritis (9/12/2014), Lymphocytic thyroiditis (9/19/2016), Memory change (10/13/2020), Memory changes (8/27/2020), Migraine without aura and without status migrainosus, not intractable (9/19/2016), Peptic ulcer (8/14/2019), Psychiatric problem, Renal colic (9/19/2016), Renal colic (9/19/2016), Renal disorder, Rotator cuff tear (1/30/2014), Shoulder impingement (1/16/2014), Thyroid goiter (4/19/2021), and Vitamin D deficiency (9/19/2016).    She has no past medical history of Arrhythmia, Bronchitis, Cataract, Cold, Congestive heart failure (HCC), Coughing blood, Dental disorder, Glaucoma, Heart burn, Heart valve disease, Hepatitis A, Hepatitis B, Hepatitis C, High cholesterol, Hypertension, Indigestion, Myocardial infarct (HCC), Other and unspecified angina pectoris,  "Other emphysema (HCC), Pacemaker, Personal history of venous thrombosis and embolism, Pneumonia, Rheumatic fever, Seizure (HCC), Sleep apnea, Snoring, Tuberculosis, Type II or unspecified type diabetes mellitus without mention of complication, not stated as uncontrolled, Unspecified hemorrhagic conditions, or Unspecified urinary incontinence.    Gluten, Morphine, Pcn [penicillins], Percocet [oxycodone-acetaminophen], Gluten meal, and Oxycodone-acetaminophen    She  reports that she quit smoking about 41 years ago. Her smoking use included cigarettes. She has a 2.50 pack-year smoking history. She has never used smokeless tobacco. She reports current alcohol use of about 2.5 oz of alcohol per week. She reports that she does not use drugs.  Counseling given: Not Answered    ROS   No chest pain, no shortness of breath, no abdominal pain.     Objective:     Physical Exam:  /66 (BP Location: Right arm, Patient Position: Sitting, BP Cuff Size: Adult)   Pulse 74   Temp 36.3 °C (97.3 °F)   Resp 14   Ht 1.651 m (5' 5\")   Wt 86.5 kg (190 lb 9.6 oz)   SpO2 94%  Body mass index is 31.72 kg/m².     Constitutional: Alert, no distress.  Skin: Warm, dry, good turgor, no rashes in visible areas.  Eye: Equal, round and reactive, conjunctiva clear, lids normal.  ENMT: Lips without lesions, good dentition, oropharynx clear.  Neck: Trachea midline, no masses, no thyromegaly. No cervical or supraclavicular lymphadenopathy.  Respiratory: Unlabored respiratory effort, lungs clear to auscultation, no wheezes, no rhonchi.  Cardiovascular: Normal S1, S2, no murmur, no edema.  Abdomen: Soft, nondistended. Normal bowel sounds. Liver and spleen are not palpable. No TTP. No guarding, no rebound tenderness. Negative Coto's sign, Rovsing's sign, McBurney's sign. No CVAT.  Psych: Alert and oriented x3, normal affect and mood.    Assessment and Plan:     1. Encounter for Medicare annual wellness exam  HRA reviewed and appropriate.  " Patient's previous medical history, healthcare maintenance and immunization status reviewed.  See discussion of anticipatory guidance and individual problems below.  Patient will return annually for Medicare annual well visit.     2. HSV infection  This problem is chronic, stable, and monitored appropriately by history/labs/imaging as needed, and is well-controlled on the current regimen. Patient to continue medication as prescribed. Continue to monitor.  - acyclovir (ZOVIRAX) 400 MG tablet; Take 1 Tablet by mouth 3 times a day.  Dispense: 30 Tablet; Refill: 3    3. Current mild episode of major depressive disorder without prior episode (HCC)  This problem is chronic, stable, and monitored appropriately by history/labs/imaging as needed, and is well-controlled on the current regimen. Denies any suicidal or homicidal ideation. Discussed that should the patient have any symptoms they should call suicide prevention hotline or report to the emergency room immediately. Emphasized importance of healthy diet and exercise.    - escitalopram (LEXAPRO) 10 MG Tab; Take 1 Tablet by mouth every day.  Dispense: 90 Tablet; Refill: 3    4. Pain of toe of left foot  X-ray ordered. Tylenol or Motrin as needed for headache or discomfort. Supportive care, differential diagnoses, and indications for immediate follow-up discussed with patient. Pathogenesis of diagnosis discussed including typical length and natural progression. Instructed to return to clinic for any change in condition, further concerns, or worsening of symptoms.  - DX-FOOT-COMPLETE 3+ LEFT; Future    5. RUQ pain  US ordered, will contact patient with results.   - US-ABDOMEN COMPLETE SURVEY; Future    6. Encounter for screening mammogram for breast cancer  Mammogram ordered.  - MA-SCREENING MAMMO BILAT W/TOMOSYNTHESIS W/CAD; Future    7. Need for hepatitis C screening test  Labs ordered.  - HEP C VIRUS ANTIBODY; Future    8. Encounter for osteoporosis screening in  asymptomatic postmenopausal patient  Dexa ordered.  - DS-BONE DENSITY STUDY (DEXA); Future    9. Hashimoto's thyroiditis  This problem is chronic, stable, and monitored appropriately by history/labs/imaging as needed, and is well-controlled on the current regimen. Continue thyroid medication.  Instructed patient to take on empty stomach with glass of water, 30 minutes prior to food or other medications.  Labs as indicated.    10. Vitamin D deficiency  This problem is chronic, stable, and monitored appropriately by history/labs/imaging as needed, and is well-controlled on the current regimen. Labs as indicated.     11. Dyslipidemia  This problem is chronic, stable, and monitored appropriately by history/labs/imaging as needed, and is well-controlled on the current regimen. Continue statin medication and lifestyle modifications.  Continue to monitor.    12. Migraine without aura and without status migrainosus, not intractable  This problem is chronic, stable, and monitored appropriately by history/labs/imaging as needed, and is well-controlled on the current regimen. Continue medication as prescribed. Decrease caffeine, maintain regular sleep schedule and at least 30-minutes of exercise most days.    13. Obesity (BMI 30-39.9)  This problem is chronic, stable, and monitored appropriately by history/labs/imaging as needed, and is well-controlled on the current regimen. Patient to continue healthy low-carb diet, 30-minutes of moderate exercise daily and avoid sugars and high-fat foods.      14. Spondylolisthesis of lumbar region  This problem is chronic, stable, and monitored appropriately by history/labs/imaging as needed, and is well-controlled on the current regimen.     15. Follicular thyroid cancer (HCC)  This problem is stable and monitored appropriately by history/labs/imaging as needed, and is well-controlled on the current regimen. Patient to continue thyroid medication as prescribed. Continue to follow up with  endocrinology. Continue to monitor.     16. History of DVT (deep vein thrombosis)  This problem is stable and monitored appropriately by history/labs/imaging as needed, and is well-controlled on the current regimen. Patient to continue to follow with anticoagulation clinic. Continue to monitor.    Discussion today about general wellness and lifestyle habits:    · Engage in regular physical activity and social activities.  · Prevent falls and reduce trip hazards; using ambulatory aides, hearing and vision testing if appropriate.  · Steps to improve urinary incontinence.  · Advanced care planning.    Follow-Up: Return in about 3 months (around 5/23/2022).         PLEASE NOTE: This dictation was created using voice recognition software. I have made every reasonable attempt to correct obvious errors, but I expect that there are errors of grammar and possibly content that I did not discover before finalizing the note.

## 2022-02-28 ENCOUNTER — APPOINTMENT (OUTPATIENT)
Dept: MEDICAL GROUP | Facility: MEDICAL CENTER | Age: 67
End: 2022-02-28
Payer: MEDICARE

## 2022-03-02 PROBLEM — R41.3 MEMORY CHANGE: Status: RESOLVED | Noted: 2020-10-13 | Resolved: 2022-03-02

## 2022-03-02 PROBLEM — R49.9 HOARSENESS OR CHANGING VOICE: Status: RESOLVED | Noted: 2021-01-11 | Resolved: 2022-03-02

## 2022-03-02 PROBLEM — Z86.718 HISTORY OF DVT (DEEP VEIN THROMBOSIS): Status: ACTIVE | Noted: 2021-11-24

## 2022-03-02 PROBLEM — E04.9 THYROID GOITER: Status: RESOLVED | Noted: 2021-04-19 | Resolved: 2022-03-02

## 2022-03-02 PROBLEM — R40.4 EPISODIC ALTERED AWARENESS: Status: RESOLVED | Noted: 2020-10-13 | Resolved: 2022-03-02

## 2022-03-14 ENCOUNTER — HOSPITAL ENCOUNTER (OUTPATIENT)
Dept: RADIOLOGY | Facility: MEDICAL CENTER | Age: 67
End: 2022-03-14
Attending: NURSE PRACTITIONER
Payer: MEDICARE

## 2022-03-14 ENCOUNTER — HOSPITAL ENCOUNTER (OUTPATIENT)
Dept: LAB | Facility: MEDICAL CENTER | Age: 67
End: 2022-03-14
Attending: NURSE PRACTITIONER
Payer: MEDICARE

## 2022-03-14 DIAGNOSIS — M79.675 PAIN OF TOE OF LEFT FOOT: ICD-10-CM

## 2022-03-14 DIAGNOSIS — R10.11 RUQ PAIN: ICD-10-CM

## 2022-03-14 DIAGNOSIS — Z11.59 NEED FOR HEPATITIS C SCREENING TEST: ICD-10-CM

## 2022-03-14 PROCEDURE — 36415 COLL VENOUS BLD VENIPUNCTURE: CPT

## 2022-03-14 PROCEDURE — 86803 HEPATITIS C AB TEST: CPT

## 2022-03-14 PROCEDURE — 76700 US EXAM ABDOM COMPLETE: CPT

## 2022-03-14 PROCEDURE — 73630 X-RAY EXAM OF FOOT: CPT | Mod: LT

## 2022-03-15 LAB — HCV AB SER QL: NORMAL

## 2022-03-21 PROBLEM — M65.331 TRIGGER FINGER, RIGHT MIDDLE FINGER: Status: ACTIVE | Noted: 2022-03-21

## 2022-03-21 PROBLEM — G56.01 CARPAL TUNNEL SYNDROME ON RIGHT: Status: ACTIVE | Noted: 2022-03-21

## 2022-03-21 PROBLEM — M65.311 TRIGGER FINGER OF RIGHT THUMB: Status: ACTIVE | Noted: 2022-03-21

## 2022-03-22 ENCOUNTER — HOSPITAL ENCOUNTER (OUTPATIENT)
Dept: RADIOLOGY | Facility: MEDICAL CENTER | Age: 67
End: 2022-03-22
Attending: NURSE PRACTITIONER
Payer: MEDICARE

## 2022-03-22 DIAGNOSIS — Z12.31 ENCOUNTER FOR SCREENING MAMMOGRAM FOR BREAST CANCER: ICD-10-CM

## 2022-03-22 DIAGNOSIS — Z78.0 ENCOUNTER FOR OSTEOPOROSIS SCREENING IN ASYMPTOMATIC POSTMENOPAUSAL PATIENT: ICD-10-CM

## 2022-03-22 DIAGNOSIS — Z13.820 ENCOUNTER FOR OSTEOPOROSIS SCREENING IN ASYMPTOMATIC POSTMENOPAUSAL PATIENT: ICD-10-CM

## 2022-03-22 PROCEDURE — 77063 BREAST TOMOSYNTHESIS BI: CPT

## 2022-03-22 PROCEDURE — 77080 DXA BONE DENSITY AXIAL: CPT

## 2022-04-18 ENCOUNTER — OFFICE VISIT (OUTPATIENT)
Dept: MEDICAL GROUP | Facility: LAB | Age: 67
End: 2022-04-18
Payer: MEDICARE

## 2022-04-18 VITALS
WEIGHT: 176.6 LBS | TEMPERATURE: 97.2 F | DIASTOLIC BLOOD PRESSURE: 58 MMHG | BODY MASS INDEX: 29.42 KG/M2 | HEART RATE: 82 BPM | RESPIRATION RATE: 14 BRPM | OXYGEN SATURATION: 98 % | SYSTOLIC BLOOD PRESSURE: 96 MMHG | HEIGHT: 65 IN

## 2022-04-18 DIAGNOSIS — R10.31 RIGHT INGUINAL PAIN: ICD-10-CM

## 2022-04-18 PROCEDURE — 99213 OFFICE O/P EST LOW 20 MIN: CPT | Performed by: NURSE PRACTITIONER

## 2022-04-18 ASSESSMENT — FIBROSIS 4 INDEX: FIB4 SCORE: 1.09

## 2022-04-18 NOTE — PROGRESS NOTES
"Subjective:     CC:   Chief Complaint   Patient presents with   • Hip Pain     R side x  6 weeks   Pain getting worse      HPI:   Corrinne presents today with the following:    Right groin pain  Patient reports right hip/groin pain for the last 6 weeks which is worsening in the last week. She reports that she hasn't really been taking pain medication for the last 10 days. She reports that it is difficult to get out of a seated position. She reports that the pain travels more to the knee recently. She has surgery scheduled for tomorrow for her carpal tunnel and cannot change her medications before the surgery.   Denies numbness/tingling.     ROS:   As documented in history of present illness above    Objective:     Exam: BP (!) 96/58 (BP Location: Right arm, Patient Position: Sitting, BP Cuff Size: Adult)   Pulse 82   Temp 36.2 °C (97.2 °F)   Resp 14   Ht 1.651 m (5' 5\")   Wt 80.1 kg (176 lb 9.6 oz)   SpO2 98%  Body mass index is 29.39 kg/m².    Constitutional: Alert, no distress, well-groomed.  Skin: Warm, dry, good turgor, no rashes in visible areas.  Eye: Equal, round and reactive, conjunctiva clear, lids normal.  ENMT: Lips without lesions, good dentition, moist mucous membranes.  Neck: Trachea midline, no masses, no thyromegaly.  Respiratory: Unlabored respiratory effort, no cough.  Abdomen: Soft, nondistended. Normal bowel sounds. Tenderness to palpation in right groin, no bulge. No guarding, no rebound tenderness. Negative Coto's sign, Rovsing's sign, McBurney's sign. No CVAT.  MSK: Normal gait, moves all extremities.  Neuro: Grossly non-focal.   Psych: Alert and oriented x3, normal affect and mood.    Assessment & Plan:     67 y.o. female with the following -     1. Right inguinal pain  US ordered, will contact patient with results. Discussed taking OTC pain medication as directed after surgery, depending on what is prescribed. Patient can call to discuss pain medications after tomorrow. Continue to " monitor.   - US-INGUINAL HERNIA; Future

## 2022-04-19 PROBLEM — Z47.89 ORTHOPEDIC AFTERCARE: Status: ACTIVE | Noted: 2022-04-19

## 2022-05-18 ENCOUNTER — HOSPITAL ENCOUNTER (OUTPATIENT)
Dept: RADIOLOGY | Facility: MEDICAL CENTER | Age: 67
End: 2022-05-18
Attending: NURSE PRACTITIONER
Payer: MEDICARE

## 2022-05-18 DIAGNOSIS — R10.31 RIGHT INGUINAL PAIN: ICD-10-CM

## 2022-05-18 PROCEDURE — 76857 US EXAM PELVIC LIMITED: CPT

## 2022-05-19 ENCOUNTER — TELEPHONE (OUTPATIENT)
Dept: MEDICAL GROUP | Facility: LAB | Age: 67
End: 2022-05-19
Payer: MEDICARE

## 2022-05-19 DIAGNOSIS — K41.90 FEMORAL HERNIA WITHOUT OBSTRUCTION OR GANGRENE, RECURRENCE NOT SPECIFIED, UNSPECIFIED LATERALITY: ICD-10-CM

## 2022-05-19 NOTE — TELEPHONE ENCOUNTER
1. Caller Name: Corrinne                         Call Back Number: [phone] (home) [phone] (work)       How would the patient prefer to be contacted with a response: Phone call OK to leave a detailed message    Pt's u/s was positive for a femoral hernia.  Can you please place a referral for surgery?

## 2022-05-20 ENCOUNTER — TELEPHONE (OUTPATIENT)
Dept: HEALTH INFORMATION MANAGEMENT | Facility: OTHER | Age: 67
End: 2022-05-20
Payer: MEDICARE

## 2022-06-03 ENCOUNTER — PRE-ADMISSION TESTING (OUTPATIENT)
Dept: ADMISSIONS | Facility: MEDICAL CENTER | Age: 67
End: 2022-06-03
Attending: SURGERY
Payer: MEDICARE

## 2022-06-03 DIAGNOSIS — Z01.810 PRE-OPERATIVE CARDIOVASCULAR EXAMINATION: ICD-10-CM

## 2022-06-03 DIAGNOSIS — Z01.812 PRE-OPERATIVE LABORATORY EXAMINATION: ICD-10-CM

## 2022-06-03 LAB
EKG IMPRESSION: NORMAL
ERYTHROCYTE [DISTWIDTH] IN BLOOD BY AUTOMATED COUNT: 45.1 FL (ref 35.9–50)
HCT VFR BLD AUTO: 46.6 % (ref 37–47)
HGB BLD-MCNC: 15.7 G/DL (ref 12–16)
MCH RBC QN AUTO: 31.2 PG (ref 27–33)
MCHC RBC AUTO-ENTMCNC: 33.7 G/DL (ref 33.6–35)
MCV RBC AUTO: 92.6 FL (ref 81.4–97.8)
PLATELET # BLD AUTO: 234 K/UL (ref 164–446)
PMV BLD AUTO: 9.7 FL (ref 9–12.9)
RBC # BLD AUTO: 5.03 M/UL (ref 4.2–5.4)
WBC # BLD AUTO: 4.8 K/UL (ref 4.8–10.8)

## 2022-06-03 PROCEDURE — 93010 ELECTROCARDIOGRAM REPORT: CPT | Performed by: INTERNAL MEDICINE

## 2022-06-03 PROCEDURE — 85027 COMPLETE CBC AUTOMATED: CPT

## 2022-06-03 PROCEDURE — 93005 ELECTROCARDIOGRAM TRACING: CPT

## 2022-06-03 PROCEDURE — 36415 COLL VENOUS BLD VENIPUNCTURE: CPT

## 2022-06-03 RX ORDER — FREMANEZUMAB-VFRM 225 MG/1.5ML
INJECTION SUBCUTANEOUS
COMMUNITY
Start: 2022-05-25 | End: 2022-11-26

## 2022-06-03 RX ORDER — OMEPRAZOLE 40 MG/1
40 CAPSULE, DELAYED RELEASE ORAL
COMMUNITY
Start: 2022-05-25

## 2022-06-03 RX ORDER — ACETAMINOPHEN 500 MG
1000 TABLET ORAL EVERY 6 HOURS PRN
Status: ON HOLD | COMMUNITY
End: 2022-06-06

## 2022-06-03 ASSESSMENT — FIBROSIS 4 INDEX: FIB4 SCORE: 1.09

## 2022-06-03 NOTE — PREPROCEDURE INSTRUCTIONS
"Pre-admit appointment completed. \"Preparing for your procedure\" sheet given to pt along with verbal and written instructions. Pt instructed to continue regularly prescribed medications through the day before surgery. Pt instructed to take the following medications the day of surgery with a sip of water, per anesthesia protocol; prilosec, synthroid, and if needed-tylenol, albuterol MDI (to bring DOS).    MET's=>4.  "

## 2022-06-06 ENCOUNTER — ANESTHESIA EVENT (OUTPATIENT)
Dept: SURGERY | Facility: MEDICAL CENTER | Age: 67
End: 2022-06-06
Payer: MEDICARE

## 2022-06-06 ENCOUNTER — HOSPITAL ENCOUNTER (OUTPATIENT)
Facility: MEDICAL CENTER | Age: 67
End: 2022-06-06
Attending: SURGERY | Admitting: SURGERY
Payer: MEDICARE

## 2022-06-06 ENCOUNTER — ANESTHESIA (OUTPATIENT)
Dept: SURGERY | Facility: MEDICAL CENTER | Age: 67
End: 2022-06-06
Payer: MEDICARE

## 2022-06-06 VITALS
OXYGEN SATURATION: 93 % | SYSTOLIC BLOOD PRESSURE: 140 MMHG | TEMPERATURE: 97.5 F | BODY MASS INDEX: 29.38 KG/M2 | WEIGHT: 176.37 LBS | DIASTOLIC BLOOD PRESSURE: 77 MMHG | HEART RATE: 73 BPM | HEIGHT: 65 IN | RESPIRATION RATE: 16 BRPM

## 2022-06-06 DIAGNOSIS — G89.18 POSTOPERATIVE PAIN: ICD-10-CM

## 2022-06-06 PROCEDURE — 00830 ANES HERNIA RPR LWR ABD NOS: CPT | Performed by: ANESTHESIOLOGY

## 2022-06-06 PROCEDURE — 700111 HCHG RX REV CODE 636 W/ 250 OVERRIDE (IP): Performed by: ANESTHESIOLOGY

## 2022-06-06 PROCEDURE — A9270 NON-COVERED ITEM OR SERVICE: HCPCS | Performed by: ANESTHESIOLOGY

## 2022-06-06 PROCEDURE — 700101 HCHG RX REV CODE 250: Performed by: SURGERY

## 2022-06-06 PROCEDURE — 502714 HCHG ROBOTIC SURGERY SERVICES: Performed by: SURGERY

## 2022-06-06 PROCEDURE — 700111 HCHG RX REV CODE 636 W/ 250 OVERRIDE (IP)

## 2022-06-06 PROCEDURE — 160036 HCHG PACU - EA ADDL 30 MINS PHASE I: Performed by: SURGERY

## 2022-06-06 PROCEDURE — 160009 HCHG ANES TIME/MIN: Performed by: SURGERY

## 2022-06-06 PROCEDURE — 160048 HCHG OR STATISTICAL LEVEL 1-5: Performed by: SURGERY

## 2022-06-06 PROCEDURE — 160025 RECOVERY II MINUTES (STATS): Performed by: SURGERY

## 2022-06-06 PROCEDURE — 160046 HCHG PACU - 1ST 60 MINS PHASE II: Performed by: SURGERY

## 2022-06-06 PROCEDURE — 160002 HCHG RECOVERY MINUTES (STAT): Performed by: SURGERY

## 2022-06-06 PROCEDURE — 700105 HCHG RX REV CODE 258: Performed by: SURGERY

## 2022-06-06 PROCEDURE — 700101 HCHG RX REV CODE 250: Performed by: ANESTHESIOLOGY

## 2022-06-06 PROCEDURE — C1781 MESH (IMPLANTABLE): HCPCS | Performed by: SURGERY

## 2022-06-06 PROCEDURE — 700102 HCHG RX REV CODE 250 W/ 637 OVERRIDE(OP): Performed by: ANESTHESIOLOGY

## 2022-06-06 PROCEDURE — 160035 HCHG PACU - 1ST 60 MINS PHASE I: Performed by: SURGERY

## 2022-06-06 PROCEDURE — 160041 HCHG SURGERY MINUTES - EA ADDL 1 MIN LEVEL 4: Performed by: SURGERY

## 2022-06-06 PROCEDURE — 160029 HCHG SURGERY MINUTES - 1ST 30 MINS LEVEL 4: Performed by: SURGERY

## 2022-06-06 DEVICE — MESH PROGRIP LAPROSCOPIC SELF FIXATING (1/CA): Type: IMPLANTABLE DEVICE | Status: FUNCTIONAL

## 2022-06-06 RX ORDER — MIDAZOLAM HYDROCHLORIDE 1 MG/ML
1 INJECTION INTRAMUSCULAR; INTRAVENOUS
Status: DISCONTINUED | OUTPATIENT
Start: 2022-06-06 | End: 2022-06-06 | Stop reason: HOSPADM

## 2022-06-06 RX ORDER — KETOROLAC TROMETHAMINE 30 MG/ML
INJECTION, SOLUTION INTRAMUSCULAR; INTRAVENOUS PRN
Status: DISCONTINUED | OUTPATIENT
Start: 2022-06-06 | End: 2022-06-06 | Stop reason: SURG

## 2022-06-06 RX ORDER — ONDANSETRON 2 MG/ML
INJECTION INTRAMUSCULAR; INTRAVENOUS PRN
Status: DISCONTINUED | OUTPATIENT
Start: 2022-06-06 | End: 2022-06-06 | Stop reason: SURG

## 2022-06-06 RX ORDER — SODIUM CHLORIDE, SODIUM LACTATE, POTASSIUM CHLORIDE, CALCIUM CHLORIDE 600; 310; 30; 20 MG/100ML; MG/100ML; MG/100ML; MG/100ML
INJECTION, SOLUTION INTRAVENOUS CONTINUOUS
Status: DISCONTINUED | OUTPATIENT
Start: 2022-06-06 | End: 2022-06-06 | Stop reason: HOSPADM

## 2022-06-06 RX ORDER — DIPHENHYDRAMINE HCL 25 MG
25 TABLET ORAL EVERY 6 HOURS PRN
Status: CANCELLED | OUTPATIENT
Start: 2022-06-06

## 2022-06-06 RX ORDER — OXYCODONE HCL 5 MG/5 ML
10 SOLUTION, ORAL ORAL
Status: COMPLETED | OUTPATIENT
Start: 2022-06-06 | End: 2022-06-06

## 2022-06-06 RX ORDER — CEFAZOLIN SODIUM 1 G/3ML
INJECTION, POWDER, FOR SOLUTION INTRAMUSCULAR; INTRAVENOUS PRN
Status: DISCONTINUED | OUTPATIENT
Start: 2022-06-06 | End: 2022-06-06 | Stop reason: SURG

## 2022-06-06 RX ORDER — HYDROMORPHONE HYDROCHLORIDE 1 MG/ML
0.2 INJECTION, SOLUTION INTRAMUSCULAR; INTRAVENOUS; SUBCUTANEOUS
Status: DISCONTINUED | OUTPATIENT
Start: 2022-06-06 | End: 2022-06-06 | Stop reason: HOSPADM

## 2022-06-06 RX ORDER — POLYETHYLENE GLYCOL 3350 17 G/17G
17 POWDER, FOR SOLUTION ORAL DAILY
Qty: 20 EACH | Refills: 0 | Status: SHIPPED | OUTPATIENT
Start: 2022-06-06 | End: 2022-08-01

## 2022-06-06 RX ORDER — IPRATROPIUM BROMIDE AND ALBUTEROL SULFATE 2.5; .5 MG/3ML; MG/3ML
3 SOLUTION RESPIRATORY (INHALATION)
Status: DISCONTINUED | OUTPATIENT
Start: 2022-06-06 | End: 2022-06-06 | Stop reason: HOSPADM

## 2022-06-06 RX ORDER — HYDROMORPHONE HYDROCHLORIDE 1 MG/ML
0.1 INJECTION, SOLUTION INTRAMUSCULAR; INTRAVENOUS; SUBCUTANEOUS
Status: DISCONTINUED | OUTPATIENT
Start: 2022-06-06 | End: 2022-06-06 | Stop reason: HOSPADM

## 2022-06-06 RX ORDER — ONDANSETRON 2 MG/ML
4 INJECTION INTRAMUSCULAR; INTRAVENOUS
Status: DISCONTINUED | OUTPATIENT
Start: 2022-06-06 | End: 2022-06-06 | Stop reason: HOSPADM

## 2022-06-06 RX ORDER — BUPIVACAINE HYDROCHLORIDE AND EPINEPHRINE 5; 5 MG/ML; UG/ML
INJECTION, SOLUTION PERINEURAL
Status: DISCONTINUED | OUTPATIENT
Start: 2022-06-06 | End: 2022-06-06 | Stop reason: HOSPADM

## 2022-06-06 RX ORDER — SODIUM CHLORIDE, SODIUM LACTATE, POTASSIUM CHLORIDE, CALCIUM CHLORIDE 600; 310; 30; 20 MG/100ML; MG/100ML; MG/100ML; MG/100ML
INJECTION, SOLUTION INTRAVENOUS CONTINUOUS
Status: ACTIVE | OUTPATIENT
Start: 2022-06-06 | End: 2022-06-06

## 2022-06-06 RX ORDER — DIPHENHYDRAMINE HYDROCHLORIDE 50 MG/ML
12.5 INJECTION INTRAMUSCULAR; INTRAVENOUS
Status: DISCONTINUED | OUTPATIENT
Start: 2022-06-06 | End: 2022-06-06 | Stop reason: HOSPADM

## 2022-06-06 RX ORDER — HYDROCODONE BITARTRATE AND ACETAMINOPHEN 5; 325 MG/1; MG/1
1 TABLET ORAL EVERY 4 HOURS PRN
Qty: 20 TABLET | Refills: 0 | Status: SHIPPED | OUTPATIENT
Start: 2022-06-06 | End: 2022-06-10

## 2022-06-06 RX ORDER — MEPERIDINE HYDROCHLORIDE 25 MG/ML
25 INJECTION INTRAMUSCULAR; INTRAVENOUS; SUBCUTANEOUS
Status: DISCONTINUED | OUTPATIENT
Start: 2022-06-06 | End: 2022-06-06 | Stop reason: HOSPADM

## 2022-06-06 RX ORDER — LIDOCAINE HYDROCHLORIDE 10 MG/ML
INJECTION, SOLUTION EPIDURAL; INFILTRATION; INTRACAUDAL; PERINEURAL
Status: COMPLETED
Start: 2022-06-06 | End: 2022-06-06

## 2022-06-06 RX ORDER — DEXAMETHASONE SODIUM PHOSPHATE 4 MG/ML
INJECTION, SOLUTION INTRA-ARTICULAR; INTRALESIONAL; INTRAMUSCULAR; INTRAVENOUS; SOFT TISSUE PRN
Status: DISCONTINUED | OUTPATIENT
Start: 2022-06-06 | End: 2022-06-06 | Stop reason: SURG

## 2022-06-06 RX ORDER — OXYCODONE HCL 5 MG/5 ML
5 SOLUTION, ORAL ORAL
Status: COMPLETED | OUTPATIENT
Start: 2022-06-06 | End: 2022-06-06

## 2022-06-06 RX ORDER — IBUPROFEN 600 MG/1
600 TABLET ORAL EVERY 6 HOURS
Qty: 45 TABLET | Refills: 0 | Status: SHIPPED | OUTPATIENT
Start: 2022-06-06 | End: 2022-11-26

## 2022-06-06 RX ORDER — DIPHENHYDRAMINE HYDROCHLORIDE 50 MG/ML
25 INJECTION INTRAMUSCULAR; INTRAVENOUS EVERY 6 HOURS PRN
Status: CANCELLED | OUTPATIENT
Start: 2022-06-06

## 2022-06-06 RX ORDER — HYDROMORPHONE HYDROCHLORIDE 1 MG/ML
0.5 INJECTION, SOLUTION INTRAMUSCULAR; INTRAVENOUS; SUBCUTANEOUS
Status: DISCONTINUED | OUTPATIENT
Start: 2022-06-06 | End: 2022-06-06 | Stop reason: HOSPADM

## 2022-06-06 RX ADMIN — FENTANYL CITRATE 25 MCG: 50 INJECTION, SOLUTION INTRAMUSCULAR; INTRAVENOUS at 14:39

## 2022-06-06 RX ADMIN — OXYCODONE HYDROCHLORIDE 5 MG: 5 SOLUTION ORAL at 14:33

## 2022-06-06 RX ADMIN — LIDOCAINE HYDROCHLORIDE 5 ML: 10 INJECTION, SOLUTION EPIDURAL; INFILTRATION; INTRACAUDAL; PERINEURAL at 10:17

## 2022-06-06 RX ADMIN — SODIUM CHLORIDE, POTASSIUM CHLORIDE, SODIUM LACTATE AND CALCIUM CHLORIDE: 600; 310; 30; 20 INJECTION, SOLUTION INTRAVENOUS at 10:17

## 2022-06-06 RX ADMIN — FENTANYL CITRATE 25 MCG: 50 INJECTION, SOLUTION INTRAMUSCULAR; INTRAVENOUS at 14:26

## 2022-06-06 RX ADMIN — DEXAMETHASONE SODIUM PHOSPHATE 8 MG: 4 INJECTION, SOLUTION INTRAMUSCULAR; INTRAVENOUS at 13:00

## 2022-06-06 RX ADMIN — KETOROLAC TROMETHAMINE 30 MG: 30 INJECTION, SOLUTION INTRAMUSCULAR at 13:00

## 2022-06-06 RX ADMIN — ROCURONIUM BROMIDE 50 MG: 10 INJECTION INTRAVENOUS at 12:50

## 2022-06-06 RX ADMIN — PROPOFOL 150 MG: 10 INJECTION, EMULSION INTRAVENOUS at 12:50

## 2022-06-06 RX ADMIN — FENTANYL CITRATE 100 MCG: 50 INJECTION, SOLUTION INTRAMUSCULAR; INTRAVENOUS at 12:50

## 2022-06-06 RX ADMIN — MIDAZOLAM HYDROCHLORIDE 2 MG: 1 INJECTION, SOLUTION INTRAMUSCULAR; INTRAVENOUS at 12:45

## 2022-06-06 RX ADMIN — SUGAMMADEX 200 MG: 100 INJECTION, SOLUTION INTRAVENOUS at 13:55

## 2022-06-06 RX ADMIN — CEFAZOLIN 2 G: 330 INJECTION, POWDER, FOR SOLUTION INTRAMUSCULAR; INTRAVENOUS at 12:45

## 2022-06-06 RX ADMIN — ONDANSETRON 4 MG: 2 INJECTION INTRAMUSCULAR; INTRAVENOUS at 13:00

## 2022-06-06 ASSESSMENT — PAIN DESCRIPTION - PAIN TYPE
TYPE: SURGICAL PAIN

## 2022-06-06 ASSESSMENT — FIBROSIS 4 INDEX: FIB4 SCORE: 1.16

## 2022-06-06 ASSESSMENT — PAIN SCALES - GENERAL: PAIN_LEVEL: 2

## 2022-06-06 NOTE — OP REPORT
Operative Report    Date: 6/6/2022    Surgeon: Johnny Mcmahon M.D.     Assistant: None    Pre-operative Diagnosis: bilateral Inguinal Hernia    Post-operative Diagnosis: Same     Procedure: Robotic bilateral Inguinal Hernia Repair with Mesh    ASA Classification: II.    Indications: This is a 67 y.o. female who presented with symptoms of bilateral Inguinal Hernia. Here for repair    The indications for a surgical assistant in this surgery were indicated due to complexity of the procedure. Their role included aiding in incision, retraction, holding devices including camera for laparoscopic procedure, and closure of the wound.      Findings: bilateral femoral left obturator     During intraoperative examination of the pelvic floor a bilateral inguinal hernia was noted.  Given our robotic approach concurrent repair would be of a benefit to the patient and the intraoperative decision was made to repair a bilateral inguinal hernia in the same anesthetic setting.  Preoperatively an extensive conversation was had with the patient about this possibility and they understood the possibility and wished to proceed.    Wound Classification: Class I, I, Clean..    Procedure in detail: The patient was seen and examined in the preoperative holding area.  The risks benefits and alternatives of the procedure were discussed with the patient who wished to proceed with the procedure as described.  The patient was transferred to the operating room placed in supine position and all pressure points were properly padded.  General endotracheal anesthesia was induced and preoperative antibiotics were given per SCIP protocol.  Patient's abdomen was prepped with ChloraPrep and draped in the normal sterile fashion.  A timeout was performed confirming correct patient, correct procedure, and that all necessary equipment was in the room.      We began the procedure by performing a periumbilical Optiview approach.  The area for our camera port  was identified and infused with local anesthetic, local anesthetic was subsequently infused over all port sites.  The skin was sharply incised and the 5 mm port was used to gain access to the abdomen.  Pneumoperitoneum was then achieved and maintained at 15 mmHg carbon dioxide throughout the entirety of the case.  Under direct visualization a right and left working port were placed with the 8 mm robotic port.  We then exchanged our 5 mm port for an 8 mm robotic port. the robot was then docked.  We began by identifying an area approximately 8 cm from the inguinal hernia and the peritoneum was sharply incised.  Using combination of blunt, electrocautery, and sharp dissection we were able to dissect the peritoneal flap down to the inguinal canal.  Careful dissection was completed medially to identify the pubic symphysis and carried this down to the obturator canal.  The corona mortise was identified and preserved.  We then continued dissection laterally until we encountered the psoas muscle ensuring that we maintain the iliopubic tract against the abdominal wall. A femoral hernia sac was identified.  Careful dissection was used to free the indirect inguinal hernia sac ensuring that the gonadal structures were carefully identified and preserved throughout the dissection of the hernia sac.  We carried our dissection down until the peritoneum was well below our area of mesh placement.  Any identified cord lipoma was carefully dissected free and reduced into the preperitoneal space..     The peritoneal flap was then extended to the other side and the dissection was completed in a similar fashion. Two 15 x 10 pro- hernia meshes were selected and introduced.  These were then laid into the dissected peritoneal flap ensuring good coverage medially down the pubic symphysis over the midline covering the  space as well as good overlap over the inguinal canal anatomy on both sides.  The peritoneal flap was manipulated  to ensure that the mesh did not move.    The peritoneal flap was then closed with a 2-0 strata fix suture.  The perineal flap was then carefully inspected and any identified rents were closed carefully using the same 2-0 strata fix suture.  The robot was then undocked and the ports were carefully removed.  Skin was then closed with 4-0 Monocryl in a subcuticular fashion and Dermabond was placed over the wounds.    The patient was awakened from general anesthetic, and was taken to the recovery room in stable condition.    Sponge and needle counts were correct at the end of the case.     Specimen: none    EBL: 15mL    Dispo: stable, extubated, to PACU    Johnny Mcmahon M.D.  [facility]  [phone]

## 2022-06-06 NOTE — OR NURSING
1403 PT RECEIVED IN PACU, REPORT RECEIVED.  VSS, RESP SPONT, EVEN, NON LABORED. PT DROWSY WITH EYES CLOSED. PT REPORTS ABDOMINAL PAIN 2/10 AND TOLERABLE.     1420 PT REPORTS LOWER ABDOMINAL PAIN 5/10. PLAN ANALGESIA. VSS.     1438 PT REPORTS ABDOMINAL PAIN 4/10. VSS.     1448 PT REPORTS LOWER ABDOMINAL PAIN 3-4/10 AND TOLERABLE. PT DENIES NAUSEA. TOLERATING PO FLUIDS.     1455 REPORT GIVEN TO LARRY KNIGHT

## 2022-06-06 NOTE — OR NURSING
1557: Discharge instructions reviewed with patient and patients . Both Verbalize understanding. PIV removed. All belongings returned to patient. Discharged home into care of responsible adult .

## 2022-06-06 NOTE — ANESTHESIA PREPROCEDURE EVALUATION
Case: [id number] Date/Time: 06/06/22 1115    Procedure: REPAIR, HERNIA, INGUINAL, ROBOT-ASSISTED, USING DA DAMARI XI - FOR FEMORAL RIGHT POSSIBLE BILATERAL    Pre-op diagnosis: RIGHT FEMORAL HERNIA    Location: [facility] OR 01 / SURGERY [facility]    Surgeons: Johnny Mcmahon M.D.          Relevant Problems   PULMONARY   (positive) History of COVID-19   (positive) Mild intermittent asthma without complication      NEURO   (positive) History of COVID-19   (positive) History of DVT (deep vein thrombosis)   (positive) Migraine without aura and without status migrainosus, not intractable      CARDIAC   (positive) Migraine without aura and without status migrainosus, not intractable       Physical Exam    Airway   Mallampati: II  TM distance: >3 FB  Neck ROM: full       Cardiovascular - normal exam  Rhythm: regular  Rate: normal  (-) murmur     Dental - normal exam           Pulmonary - normal exam  Breath sounds clear to auscultation     Abdominal    Neurological - normal exam                 Anesthesia Plan    ASA 2       Plan - general       Airway plan will be ETT          Induction: intravenous    Postoperative Plan: Postoperative administration of opioids is intended.    Pertinent diagnostic labs and testing reviewed    Informed Consent:    Anesthetic plan and risks discussed with patient.    Use of blood products discussed with: patient whom consented to blood products.

## 2022-06-06 NOTE — ANESTHESIA PROCEDURE NOTES
Airway    Date/Time: 6/6/2022 12:51 PM  Performed by: Arsenio Malhotra M.D.  Authorized by: Arsenio Malhotra M.D.     Location:  OR  Urgency:  Elective  Indications for Airway Management:  Anesthesia      Spontaneous Ventilation: absent    Sedation Level:  Deep  Preoxygenated: Yes    Patient Position:  Sniffing  Final Airway Type:  Endotracheal airway  Final Endotracheal Airway:  ETT  Cuffed: Yes    Technique Used for Successful ETT Placement:  Direct laryngoscopy    Insertion Site:  Oral  Blade Type:  Gato  Laryngoscope Blade/Videolaryngoscope Blade Size:  3  ETT Size (mm):  7.0  Measured from:  Teeth  ETT to Teeth (cm):  21  Placement Verified by: auscultation and capnometry    Cormack-Lehane Classification:  Grade I - full view of glottis  Number of Attempts at Approach:  1

## 2022-06-06 NOTE — DISCHARGE INSTRUCTIONS
If you have any additional questions, please do not hesitate to call the office.    ACTIVITY: Rest and take it easy for the first 24 hours.  A responsible adult is recommended to remain with you during that time.  It is normal to feel sleepy.  We encourage you to not do anything that requires balance, judgment or coordination.    MILD FLU-LIKE SYMPTOMS ARE NORMAL. YOU MAY EXPERIENCE GENERALIZED MUSCLE ACHES, THROAT IRRITATION, HEADACHE AND/OR SOME NAUSEA.    FOR 24 HOURS DO NOT:  Drive, operate machinery or run household appliances.  Drink beer or alcoholic beverages.   Make important decisions or sign legal documents.    SPECIAL INSTRUCTIONS: Inguinal Discharge Instructions:    ACTIVITIES: Upon discharge from the hospital, the day of surgery it is requested that you do no significant physical activity and limit mental activities, as you have had sedation. The day after surgery, you may resume activities of daily living, but for four weeks, it is recommended that you do no strenuous activities or heavy lifting (greater than 15 pounds).     DRIVING: You may drive whenever you are off pain medications and are able to perform the activities needed to drive, i.e. turning, bending, twisting, etc.     WOUND: It is not unusual for patients to experience swelling and even bruising at the hernia repair site. With inguinal hernias, sometimes the bruising and swelling may extend on to the penis or into the scrotum of male patients. This will resolve over the next few days.     ICE: please use ice on the wound to decrease the swelling for the first 24 hours and then discontinue.     BATHING: The dressing can be removed 48 hours after surgery and the wound can then be wetted in a shower as normal, but avoid submersion in water (tub bath) for at least 2 weeks.    PAIN MEDICATION: You will be given a prescription for pain medication at discharge. Please take these as directed. It is important to remember not to take medications  on an empty stomach as this may cause nausea.     BOWEL FUNCTION: After hernia repair, it is not uncommon for patients to experience constipation. This is due to decreasing activity levels as well as pain medications. You may wish to use a stool softener beginning immediately after surgery, and you may or may not need to use a laxative (Milk of Magnesia, Ex-lax; Senokot, etc.) as well.            CALL IF YOU HAVE: Drainage or fluid from incision that may be foul smelling,  increased tenderness or soreness at the wound or the wound edges are no  longer together,redness or swelling at the incision site. Please do not hesitate to  call with any other questions.     APPOINTMENT: Contact our office at [phone] for a follow-up appointment in 1 week following your procedure.     DIET: To avoid nausea, slowly advance diet as tolerated, avoiding spicy or greasy foods for the first day.  Add more substantial food to your diet according to your physician's instructions.  Babies can be fed formula or breast milk as soon as they are hungry.  INCREASE FLUIDS AND FIBER TO AVOID CONSTIPATION.    FOLLOW-UP APPOINTMENT:  A follow-up appointment should be arranged with your doctor; call to schedule.    You should CALL YOUR PHYSICIAN if you develop:  Fever greater than 101 degrees F.  Pain not relieved by medication, or persistent nausea or vomiting.  Excessive bleeding (blood soaking through dressing) or unexpected drainage from the wound.  Extreme redness or swelling around the incision site, drainage of pus or foul smelling drainage.  Inability to urinate or empty your bladder within 8 hours.  Problems with breathing or chest pain.    You should call 911 if you develop problems with breathing or chest pain.  If you are unable to contact your doctor or surgical center, you should go to the nearest emergency room or urgent care center.      Physician's telephone #: DR. MORGAN  [phone]    If any questions arise, call your  doctor.  If your doctor is not available, please feel free to call the Surgical Center at [phone].     A registered nurse may call you a few days after your surgery to see how you are doing after your procedure.    MEDICATIONS: Resume taking daily medication.  Take prescribed pain medication with food.  If no medication is prescribed, you may take non-aspirin pain medication if needed.  PAIN MEDICATION CAN BE VERY CONSTIPATING.  Take a stool softener or laxative such as senokot, pericolace, or milk of magnesia if needed.     these medications from [facility]/pharmacy #[id number] - [place], [place] - [address]  HYDROcodone-acetaminophen  Ibuprofen  polyethylene glycol/lytes    Last pain medication given at 2:33PM (OXYCODONE 5MG).    If your physician has prescribed pain medication that includes Acetaminophen (Tylenol), do not take additional Acetaminophen (Tylenol) while taking the prescribed medication.    Depression / Suicide Risk    As you are discharged from this [facility] facility, it is important to learn how to keep safe from harming yourself.    Recognize the warning signs:  Abrupt changes in personality, positive or negative- including increase in energy   Giving away possessions  Change in eating patterns- significant weight changes-  positive or negative  Change in sleeping patterns- unable to sleep or sleeping all the time   Unwillingness or inability to communicate  Depression  Unusual sadness, discouragement and loneliness  Talk of wanting to die  Neglect of personal appearance   Rebelliousness- reckless behavior  Withdrawal from people/activities they love  Confusion- inability to concentrate     If you or a loved one observes any of these behaviors or has concerns about self-harm, here's what you can do:  Talk about it- your feelings and reasons for harming yourself  Remove any means that you might use to hurt yourself (examples: pills, rope, extension cords, firearm)  Get professional help  from the community (Mental Health, Substance Abuse, psychological counseling)  Do not be alone:Call your Safe Contact- someone whom you trust who will be there for you.  Call your local CRISIS HOTLINE 958-5670 or 307-461-7472  Call your local Children's Mobile Crisis Response Team [place] (635) 894-2140 or www.[employer]  Call the toll free National Suicide Prevention Hotlines   National Suicide Prevention Lifeline 241-994-GRVP (2642)  National [place] Education Line Network 800-SUICIDE (362-0161)

## 2022-06-06 NOTE — ANESTHESIA TIME REPORT
Anesthesia Start and Stop Event Times     Date Time Event    6/6/2022 1202 Ready for Procedure     1245 Anesthesia Start     1404 Anesthesia Stop        Responsible Staff  06/06/22    Name Role Begin End    Arsenio Malhotra M.D. Anesth 1245 1401        Overtime Reason:  no overtime (within assigned shift)    Comments:

## 2022-06-06 NOTE — OR NURSING
1455: Assumed care of patient at this time. Patient resting comfortably. Rates pain 2/10 and denies nausea.     1505: Patients  updated.     1516: Report called to Steven KNIGHT in stage 2. Patient transported to stage 2 by CNA.

## 2022-06-06 NOTE — ANESTHESIA POSTPROCEDURE EVALUATION
Patient: Corrinne Lynne Cibulsky    Procedure Summary     Date: 06/06/22 Room / Location:  OR  / SURGERY [facility]    Anesthesia Start: 1245 Anesthesia Stop: 1404    Procedure: REPAIR, HERNIA, INGUINAL, ROBOT-ASSISTED, USING DA DAMARI XI - FOR FEMORAL BILATERAL (Bilateral Abdomen) Diagnosis: (BILATERAL)    Surgeons: Johnny Mcmahon M.D. Responsible Provider: Arsenio Malhotra M.D.    Anesthesia Type: general ASA Status: 2          Final Anesthesia Type: general  Last vitals  BP   Blood Pressure : (!) 140/77, NIBP: 132/81    Temp   36.5 °C (97.7 °F)    Pulse   73   Resp   14    SpO2   94 %      Anesthesia Post Evaluation    Patient location during evaluation: PACU  Patient participation: complete - patient participated  Level of consciousness: awake and alert  Pain score: 2    Airway patency: patent  Anesthetic complications: no  Cardiovascular status: hemodynamically stable  Respiratory status: acceptable  Hydration status: euvolemic    PONV: none          No complications documented.     Nurse Pain Score: 2 (NPRS)

## 2022-06-13 NOTE — PROGRESS NOTES
Pt will get INR at MD visit tomorrow   Subjective:     Chief Complaint   Patient presents with   • Eye Problem     left eye red,swollen, denies itching   • Migraine       Lynne Corinne Cibulsky is a 65 y.o. female here today for evaluation and management of:    Current mild episode of major depressive disorder without prior episode (HCC)  Started on Cymbalta 3 weeks ago and her pain is improved and she is not crying all the time.  She has had 3 episodes were she could remember what happened the night before    Depression Screening    Little interest or pleasure in doing things?  0 - not at all  Feeling down, depressed , or hopeless? 0 - not at all  Patient Health Questionnaire Score: 0   Trouble falling or staying asleep-2 more than half the days  Feeling tired or having no energy-1 several days  Poor appetite or overeating-2-more than half the days  Feeling bad about herself 0 not at all  Trouble concentrating-1-several days  Moving or speaking so slowly - 1 -several days  Thoughts that you are better off dead-0-not at all  PHQ-9 7  If depressive symptoms identified deferred to follow up visit unless specifically addressed in assesment and plan.      Interpretation of PHQ-9 Total Score   Score Severity   1-4 Minimal Depression   5-9 Mild Depression   10-14 Moderate Depression   15-19 Moderately Severe Depression   20-27 Severe Depression      Patient is also complaining of left swollen eye with some discharge.  It has been red for the last day.    Allergies   Allergen Reactions   • Gluten      GI upset   • Morphine Vomiting   • Pcn [Penicillins] Rash     Tolerates Ancef.   • Percocet [Oxycodone-Acetaminophen] Unspecified     migraine  migraine       Current medicines (including changes today)  Current Outpatient Medications   Medication Sig Dispense Refill   • DULoxetine (CYMBALTA) 30 MG Cap DR Particles Take 30 mg by mouth 2 times a day.     • neomycin-polymyxin-dexamethasone (MAXITROL) 0.1 % ophthalmic suspension Place 2 Drops in left eye 4 times a  day for 5 days. 10 mL 0   • sumatriptan (IMITREX) 100 MG tablet Take 1 Tab by mouth Once PRN for Migraine for up to 1 dose. 9 Tab 3   • celecoxib (CELEBREX) 100 MG Cap Celebrex 100 mg capsule   Take 1 capsule twice a day by oral route as needed.     • SYNTHROID 150 MCG Tab TAKE ONE TABLET BY MOUTH EVERY MORNING ON AN EMPTY STOMACH 90 Tab 2   • albuterol 108 (90 Base) MCG/ACT Aero Soln inhalation aerosol Inhale 2 Puffs by mouth every four hours as needed for Shortness of Breath (cough and wheezing). 1 Inhaler 3   • acyclovir (ZOVIRAX) 400 MG tablet TAKE ONE TABLET BY MOUTH THREE TIMES A DAY 30 Tab 3   • SYNTHROID 137 MCG Tab Take 1 Tab by mouth Every morning on an empty stomach. 90 Tab 3   • Melatonin 10 MG Tab Take  by mouth.     • omeprazole (PRILOSEC) 40 MG delayed-release capsule Take 1 Cap by mouth every day. 30 Cap 2     No current facility-administered medications for this visit.        She  has a past medical history of Anesthesia, Arthritis, ASTHMA, Asthma (7/11/2014), Breath shortness, Chest pain (7/10/2014), Endometriosis of vagina (9/19/2016), H/O migraine (7/11/2014), HSV infection (9/19/2016), Hyperlipidemia (9/19/2016), Hyperthyroidism, Knee osteoarthritis (9/12/2014), Lymphocytic thyroiditis (9/19/2016), Migraine without aura and without status migrainosus, not intractable (9/19/2016), Psychiatric problem, Renal colic (9/19/2016), Renal disorder, Rotator cuff tear (1/30/2014), and Vitamin D deficiency (9/19/2016). She also has no past medical history of Arrhythmia, Bronchitis, Cancer (HCC), Cataract, Cold, Congestive heart failure (HCC), Coughing blood, Dental disorder, Glaucoma, Heart burn, Heart valve disease, Hepatitis A, Hepatitis B, Hepatitis C, High cholesterol, Hypertension, Indigestion, Myocardial infarct (HCC), Other and unspecified angina pectoris, Other emphysema (HCC), Pacemaker, Personal history of venous thrombosis and embolism, Pneumonia, Rheumatic fever, Seizure (HCC), Sleep apnea,  "Snoring, Tuberculosis, Type II or unspecified type diabetes mellitus without mention of complication, not stated as uncontrolled, Unspecified hemorrhagic conditions, or Unspecified urinary incontinence.    Patient Active Problem List    Diagnosis Date Noted   • Current mild episode of major depressive disorder without prior episode (HCC) 08/27/2020   • Memory changes 08/27/2020   • Lumbar radiculopathy 05/26/2020   • Mild intermittent asthma without complication 05/14/2020   • Dense breast tissue on mammogram 05/14/2020   • Spondylolisthesis of lumbar region 01/07/2020   • Peptic ulcer 08/14/2019   • Epigastric pain 10/09/2018   • Obesity (BMI 30-39.9) 05/09/2017   • Endometriosis of vagina 09/19/2016   • Lymphocytic thyroiditis 09/19/2016   • Renal colic 09/19/2016   • Vitamin D deficiency 09/19/2016   • Dyslipidemia 09/19/2016   • Migraine without aura and without status migrainosus, not intractable 09/19/2016   • HSV infection 09/19/2016   • Knee osteoarthritis 09/12/2014   • Hypokalemia 07/11/2014   • S/P rotator cuff repair 02/27/2014   • Shoulder impingement 01/16/2014       ROS   No fever or chills.  No nausea or vomiting.  No chest pain or palpitations.  No cough or SOB.  No pain with urination or hematuria.  No black or bloody stools.       Objective:     /80 (BP Location: Right arm, Patient Position: Sitting, BP Cuff Size: Adult)   Pulse 98   Temp 37 °C (98.6 °F) (Temporal)   Resp 16   Ht 1.648 m (5' 4.88\")   Wt 91.6 kg (202 lb)   SpO2 97%  Body mass index is 33.74 kg/m².   Physical Exam:  Well developed, well nourished.  Alert, oriented in no acute distress.  Eye contact is good, speech goal directed, affect calm  Eyes: conjunctiva injected on the left with cobblestoning of the mucosa, sclera non-icteric.  PERRL.  EOMs intact  Neck Supple.  No adenopathy or masses in the neck or supraclavicular regions. No thyromegaly  Lungs: clear to auscultation bilaterally with good excursion. No wheezes or " rhonchi  CV: regular rate and rhythm. No murmur  Neuro: Cranial nerves II through XII are grossly intact.  5/5 motor strength of the upper and lower extremities.  Normal gait.  Negative Romberg          Assessment and Plan:   The following treatment plan was discussed    1. Migraine without aura and without status migrainosus, not intractable  MRI of the brain as the headaches have changed significantly.  Imitrex 100 mg as directed  - MR-BRAIN-W/O; Future  - sumatriptan (IMITREX) 100 MG tablet; Take 1 Tab by mouth Once PRN for Migraine for up to 1 dose.  Dispense: 9 Tab; Refill: 3    2. Memory changes  Discussed this is may be secondary to the Cymbalta but given her other symptoms and the change in her symptoms we will get an MRI to assess  - MR-BRAIN-W/O; Future    3. Current mild episode of major depressive disorder without prior episode (HCC)  Continue Cymbalta for now.  Once she is weaned off of it we can look at switching to a different SSRI    4. Acute bacterial conjunctivitis of left eye  Eyedrops as directed.  Hygiene discussed.  Call if not improving  - neomycin-polymyxin-dexamethasone (MAXITROL) 0.1 % ophthalmic suspension; Place 2 Drops in left eye 4 times a day for 5 days.  Dispense: 10 mL; Refill: 0    5. New daily persistent headache  MRI to assess  - MR-BRAIN-W/O; Future    Any change or worsening of signs or symptoms, patient encouraged to follow-up or report to the emergency room for further evaluation. Patient understands and agrees.    Followup: Return in about 6 weeks (around 10/8/2020).

## 2022-06-17 DIAGNOSIS — M25.551 RIGHT HIP PAIN: ICD-10-CM

## 2022-07-18 PROBLEM — M25.551 RIGHT HIP PAIN: Status: ACTIVE | Noted: 2022-07-18

## 2022-07-18 PROBLEM — M16.11 PRIMARY OSTEOARTHRITIS OF RIGHT HIP: Status: ACTIVE | Noted: 2022-07-18

## 2022-11-22 DIAGNOSIS — I82.402 ACUTE DEEP VEIN THROMBOSIS (DVT) OF LEFT LOWER EXTREMITY, UNSPECIFIED VEIN (HCC): ICD-10-CM

## 2022-11-26 ENCOUNTER — OFFICE VISIT (OUTPATIENT)
Dept: URGENT CARE | Facility: CLINIC | Age: 67
End: 2022-11-26
Payer: MEDICARE

## 2022-11-26 ENCOUNTER — HOSPITAL ENCOUNTER (OUTPATIENT)
Facility: MEDICAL CENTER | Age: 67
End: 2022-11-26
Payer: MEDICARE

## 2022-11-26 VITALS
BODY MASS INDEX: 36.32 KG/M2 | HEIGHT: 60 IN | HEART RATE: 78 BPM | SYSTOLIC BLOOD PRESSURE: 120 MMHG | OXYGEN SATURATION: 95 % | RESPIRATION RATE: 14 BRPM | DIASTOLIC BLOOD PRESSURE: 80 MMHG | WEIGHT: 185 LBS | TEMPERATURE: 97.9 F

## 2022-11-26 DIAGNOSIS — R30.0 DYSURIA: ICD-10-CM

## 2022-11-26 DIAGNOSIS — N30.01 ACUTE CYSTITIS WITH HEMATURIA: ICD-10-CM

## 2022-11-26 DIAGNOSIS — R39.15 URINARY URGENCY: ICD-10-CM

## 2022-11-26 DIAGNOSIS — R35.0 FREQUENCY OF URINATION: ICD-10-CM

## 2022-11-26 DIAGNOSIS — R31.0 GROSS HEMATURIA: ICD-10-CM

## 2022-11-26 LAB
APPEARANCE UR: NORMAL
BILIRUB UR STRIP-MCNC: NEGATIVE MG/DL
COLOR UR AUTO: YELLOW
GLUCOSE UR STRIP.AUTO-MCNC: NEGATIVE MG/DL
KETONES UR STRIP.AUTO-MCNC: NEGATIVE MG/DL
LEUKOCYTE ESTERASE UR QL STRIP.AUTO: NEGATIVE
NITRITE UR QL STRIP.AUTO: NEGATIVE
PH UR STRIP.AUTO: 5.5 [PH] (ref 5–8)
PROT UR QL STRIP: NEGATIVE MG/DL
RBC UR QL AUTO: NEGATIVE
SP GR UR STRIP.AUTO: 1.01
UROBILINOGEN UR STRIP-MCNC: 0.2 MG/DL

## 2022-11-26 PROCEDURE — 87086 URINE CULTURE/COLONY COUNT: CPT

## 2022-11-26 PROCEDURE — 81002 URINALYSIS NONAUTO W/O SCOPE: CPT

## 2022-11-26 PROCEDURE — 99213 OFFICE O/P EST LOW 20 MIN: CPT

## 2022-11-26 PROCEDURE — 87186 SC STD MICRODIL/AGAR DIL: CPT

## 2022-11-26 PROCEDURE — 87077 CULTURE AEROBIC IDENTIFY: CPT

## 2022-11-26 RX ORDER — SULFAMETHOXAZOLE AND TRIMETHOPRIM 800; 160 MG/1; MG/1
1 TABLET ORAL 2 TIMES DAILY
Qty: 10 TABLET | Refills: 0 | Status: SHIPPED | OUTPATIENT
Start: 2022-11-26 | End: 2022-12-01

## 2022-11-26 ASSESSMENT — ENCOUNTER SYMPTOMS
FEVER: 0
ABDOMINAL PAIN: 1
VOMITING: 0
CHILLS: 0
NAUSEA: 0

## 2022-11-26 ASSESSMENT — FIBROSIS 4 INDEX: FIB4 SCORE: 1.16

## 2022-11-26 NOTE — PROGRESS NOTES
Subjective     Corrinne Lynne Cibulsky is a 67 y.o. female who presents with UTI (X 7 days, burning, frequency, urgency, abdominal pain.)            HPI    Patient presents with symptoms started a week ago.  She endorses dysuria, urinary urgency, urinary frequency, and hematuria.  She also reports suprapubic discomfort, but denies any severe abdominal pain.  She reports trying homeopathic treatment at home, but this is persisted.  She denies any flank pains.  She denies any fever, chills, nausea, or vomiting.    Patient's current problem list, medications, and past medical/surgical history were reviewed in Epic.    PMH:  has a past medical history of Anesthesia, Arthritis, Asthma (07/11/2014), Blood clotting disorder (HCC) (11/24/2021), Breath shortness, Bronchitis, Cancer ([facility]) (08/04/2021), Carpal tunnel syndrome on left (10/13/2021), Chest pain (07/10/2014), COVID-19 (11/08/2021), Endometriosis of vagina (09/19/2016), Epigastric pain (10/09/2018), Episodic altered awareness (10/13/2020), Follicular thyroid cancer (HCC) (08/04/2021), H/O migraine (07/11/2014), High cholesterol, Hoarseness or changing voice (01/11/2021), HSV infection (09/19/2016), Hyperlipidemia (09/19/2016), Hyperthyroidism, Hypokalemia (07/11/2014), Knee osteoarthritis (09/12/2014), 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    She has no past medical history of Arrhythmia, Cataract, Cold, Congestive heart failure (HCC), Coughing blood, Dental disorder, Glaucoma, Heart burn, Heart valve disease, Hepatitis A, Hepatitis B, Hepatitis C, Hypertension,  Indigestion, Myocardial infarct (HCC), Other and unspecified angina pectoris, Other emphysema (HCC), Pacemaker, Pneumonia, Rheumatic fever, Seizure (HCC), Sleep apnea, Snoring, Tuberculosis, Type II or unspecified type diabetes mellitus without mention of complication, not stated as uncontrolled, Unspecified hemorrhagic conditions, or Unspecified urinary incontinence.  MEDS:   Current Outpatient Medications:     omeprazole (PRILOSEC) 40 MG delayed-release capsule, Take 40 mg by mouth every day. EVERY OTHER DAY, Disp: , Rfl:     Non Formulary Request, Take  by mouth 1 time a day as needed. CBD GUMMIES, Disp: , Rfl:     SYNTHROID 137 MCG Tab, TAKE 1 TABLET BY MOUTH EVERY DAY IN THE MORNING ON AN EMPTY STOMACH, Disp: 90 Tablet, Rfl: 3    albuterol 108 (90 Base) MCG/ACT Aero Soln inhalation aerosol, Inhale 2 Puffs every four hours as needed for Shortness of Breath (cough and wheezing)., Disp: 1 Each, Rfl: 3  ALLERGIES:   Allergies   Allergen Reactions    Gluten      GI upset    Morphine Vomiting    Pcn [Penicillins] Rash     Tolerates Ancef.    Percocet [Oxycodone-Acetaminophen] Unspecified     migraine  migraine     SURGHX:   Past Surgical History:   Procedure Laterality Date    NH TOTAL HIP ARTHROPLASTY Right 10/17/2022    Procedure: RIGHT HIP TOTAL ARTHROPLASTY;  Surgeon: Sin Soto M.D.;  Location: [facility];  Service: Orthopedics    NH LAP,INGUINAL HERNIA REPR,INITIAL Bilateral 6/6/2022    Procedure: REPAIR, HERNIA, INGUINAL, ROBOT-ASSISTED, USING DA DAMARI XI - FOR FEMORAL BILATERAL;  Surgeon: Johnny Mcmahon M.D.;  Location: SURGERY [facility];  Service: Gen Robotic    NH NEUROPLASTY & OR TRANSPOS MEDIAN NRV CARPAL DEYVI Right 04/19/2022    Procedure: RIGHT OPEN CARPAL TUNNEL RELEASE;  Surgeon: Seun Fleming M.D.;  Location: [facility];  Service: Orthopedics    PB INCISE FINGER TENDON SHEATH Right 04/19/2022    Procedure: RIGHT THUMB AND MIDDLE FINGER TRIGGER RELEASE;   Surgeon: Seun Fleming M.D.;  Location: [facility];  Service: Orthopedics    THYROID LOBECTOMY  08/2021    COLONOSCOPY  2021    KNEE ARTHROPLASTY TOTAL Left 10/23/2015    Procedure: LEFT TOTAL KNEE REPLACEMENT;  Surgeon: Leeroy Camacho M.D.;  Location: SURGERY [facility];  Service:     KNEE ARTHROPLASTY TOTAL  09/12/2014    Performed by Leeroy Camacho M.D. at SURGERY [facility]    SHOULDER ARTHROSCOPY Left 2013    SHOULDER ARTHROSCOPY Right 2012    TIFFANY BY LAPAROSCOPY  2009    PRIMARY C SECTION  10/31/1992    LUMPECTOMY Right 1972    benign    FL ANESTH,NANCY DEL AFTER NEURAXIAL ANESTH      TONSILLECTOMY      as child     SOCHX:  reports that she quit smoking about 42 years ago. Her smoking use included cigarettes. She has a 2.50 pack-year smoking history. She has never used smokeless tobacco. She reports current alcohol use of about 2.5 oz per week. She reports that she does not currently use drugs after having used the following drugs: Oral.  FH: Reviewed with patient, not pertinent to this visit.       Review of Systems   Constitutional:  Positive for malaise/fatigue. Negative for chills and fever.   Gastrointestinal:  Positive for abdominal pain (suprapubic discomfort). Negative for nausea and vomiting.   Genitourinary:  Positive for dysuria, frequency, hematuria and urgency.            Objective     /80   Pulse 78   Temp 36.6 °C (97.9 °F) (Temporal)   Resp 14   Ht 1.524 m (5')   Wt 83.9 kg (185 lb)   SpO2 95%   BMI 36.13 kg/m²      Physical Exam  Constitutional:       Appearance: Normal appearance.   HENT:      Head: Normocephalic.      Nose: Nose normal.   Eyes:      Extraocular Movements: Extraocular movements intact.   Cardiovascular:      Rate and Rhythm: Normal rate and regular rhythm.      Pulses: Normal pulses.      Heart sounds: Normal heart sounds.   Pulmonary:      Effort: Pulmonary effort is normal.      Breath sounds: Normal breath sounds.   Abdominal:       Tenderness: There is no right CVA tenderness or left CVA tenderness.   Musculoskeletal:         General: Normal range of motion.      Cervical back: Normal range of motion.   Skin:     General: Skin is warm and dry.   Neurological:      General: No focal deficit present.      Mental Status: She is alert.   Psychiatric:         Mood and Affect: Mood normal.         Behavior: Behavior normal.         Judgment: Judgment normal.         Assessment & Plan        1. Acute cystitis with hematuria    - sulfamethoxazole-trimethoprim (BACTRIM DS) 800-160 MG tablet; Take 1 Tablet by mouth 2 times a day for 5 days.  Dispense: 10 Tablet; Refill: 0  - URINE CULTURE(NEW); Future    2. Dysuria    - URINE CULTURE(NEW); Future  - POCT Urinalysis    3. Frequency of urination    - URINE CULTURE(NEW); Future  - POCT Urinalysis    4. Urinary urgency    - URINE CULTURE(NEW); Future  - POCT Urinalysis    5. Gross hematuria    - URINE CULTURE(NEW); Future  - POCT Urinalysis    Patient's urinalysis is unremarkable, but her clinical presentation is consistent with urinary tract infection.  She is prescribed Bactrim twice daily for 5 days, pending urine culture. Discussed treatment plan with patient, she is agreeable and verbalized understanding.  Educated patient on signs and symptoms watch out for, when to return to the clinic or go to the ER.      Electronically Signed by DELON Bryan

## 2022-11-29 LAB
BACTERIA UR CULT: ABNORMAL
BACTERIA UR CULT: ABNORMAL
SIGNIFICANT IND 70042: ABNORMAL
SITE SITE: ABNORMAL
SOURCE SOURCE: ABNORMAL

## 2023-01-12 NOTE — LETTER
May 14, 2020         Patient: Lynne Corinne Cibulsky   YOB: 1955   Date of Visit: 5/14/2020           To Whom it May Concern:    Mónica English was seen in my clinic on 5/14/2020. She has mild intermittent asthma and is okay to return to work with CDC recommended precautions.    If you have any questions or concerns, please don't hesitate to call.        Sincerely,           Silvia Carrasco M.D.  Electronically Signed      Klisyri Counseling:  I discussed with the patient the risks of Klisyri including but not limited to erythema, scaling, itching, weeping, crusting, and pain.

## 2023-01-17 ENCOUNTER — OFFICE VISIT (OUTPATIENT)
Dept: MEDICAL GROUP | Facility: LAB | Age: 68
End: 2023-01-17
Payer: MEDICARE

## 2023-01-17 VITALS
OXYGEN SATURATION: 93 % | RESPIRATION RATE: 14 BRPM | HEIGHT: 60 IN | DIASTOLIC BLOOD PRESSURE: 68 MMHG | HEART RATE: 86 BPM | BODY MASS INDEX: 37.93 KG/M2 | WEIGHT: 193.2 LBS | SYSTOLIC BLOOD PRESSURE: 122 MMHG | TEMPERATURE: 97.7 F

## 2023-01-17 DIAGNOSIS — M79.672 LEFT FOOT PAIN: ICD-10-CM

## 2023-01-17 PROCEDURE — 99213 OFFICE O/P EST LOW 20 MIN: CPT | Performed by: NURSE PRACTITIONER

## 2023-01-17 ASSESSMENT — PATIENT HEALTH QUESTIONNAIRE - PHQ9
8. MOVING OR SPEAKING SO SLOWLY THAT OTHER PEOPLE COULD HAVE NOTICED. OR THE OPPOSITE, BEING SO FIGETY OR RESTLESS THAT YOU HAVE BEEN MOVING AROUND A LOT MORE THAN USUAL: NOT AT ALL
6. FEELING BAD ABOUT YOURSELF - OR THAT YOU ARE A FAILURE OR HAVE LET YOURSELF OR YOUR FAMILY DOWN: NOT AL ALL
9. THOUGHTS THAT YOU WOULD BE BETTER OFF DEAD, OR OF HURTING YOURSELF: NOT AT ALL
1. LITTLE INTEREST OR PLEASURE IN DOING THINGS: NOT AT ALL
7. TROUBLE CONCENTRATING ON THINGS, SUCH AS READING THE NEWSPAPER OR WATCHING TELEVISION: NOT AT ALL
2. FEELING DOWN, DEPRESSED, IRRITABLE, OR HOPELESS: NOT AT ALL
SUM OF ALL RESPONSES TO PHQ9 QUESTIONS 1 AND 2: 0
SUM OF ALL RESPONSES TO PHQ QUESTIONS 1-9: 0
5. POOR APPETITE OR OVEREATING: NOT AT ALL
4. FEELING TIRED OR HAVING LITTLE ENERGY: NOT AT ALL
3. TROUBLE FALLING OR STAYING ASLEEP OR SLEEPING TOO MUCH: NOT AT ALL

## 2023-01-17 ASSESSMENT — FIBROSIS 4 INDEX: FIB4 SCORE: 1.16

## 2023-01-18 NOTE — PROGRESS NOTES
Subjective:     CC:   Chief Complaint   Patient presents with    Foot Pain     L foot      HPI:   Corrinne presents today with the following:    Foot pain  Onset yesterday. Reports that she woke up with left foot pain. Worse with stepping, feels like she has a piece of glass in her foot. Denies injury. Reports burning, stabbing pain with pressure. Better with rest. Denies numbness/tingling.    ROS:   As documented in history of present illness above    Objective:     Exam: /68 (BP Location: Right arm, Patient Position: Sitting, BP Cuff Size: Adult)   Pulse 86   Temp 36.5 °C (97.7 °F)   Resp 14   Ht 1.524 m (5')   Wt 87.6 kg (193 lb 3.2 oz)   SpO2 93%  Body mass index is 37.73 kg/m².    Constitutional: Alert, no distress, well-groomed.  Skin: Warm, dry, good turgor, no rashes in visible areas.  Eye: Equal, round and reactive, conjunctiva clear, lids normal.  ENMT: Lips without lesions, good dentition, moist mucous membranes.  Neck: Trachea midline, no masses, no thyromegaly.  Respiratory: Unlabored respiratory effort, no cough.  MSK: Patient is able to bear weight on left foot. Antalgic gait.  Ankles: No noticeable deformity, swelling or bruising. ROM intact. Tenderness to palpation on heel. No tenderness to palpation along posterior edge of lateral and medial malleoli, base of 5th metatarsal or navicular bone. No tenderness along fibula. Squeeze test negative. Sensation intact. 2+ pedal pulses.  Neuro: Grossly non-focal.   Psych: Alert and oriented x3, normal affect and mood.    Assessment & Plan:     67 y.o. female with the following -     1. Left foot pain  X-ray ordered.  Referral placed to orthopedics. NSAID's (ibuprofen) and tylenol as directed for pain and inflammation.     -RICE Therapy: Rest, Ice, Compression, Elevation  -Limited weight bearing for 2-3 days;  and/or weight bearing as tolerated.   -Ice 15 to 20 minutes every two to three hours for the first 48 hours.  -Compression with an elastic  bandage for swelling.  -Range of motion exercises.    Follow up emergently for severe uncontrolled pain, neurovascular compromise (decreased sensation, motion, or circulation). Follow up if symptoms persist for more than six to eight weeks.    - DX-FOOT-COMPLETE 3+ LEFT; Future  - Referral to Orthopedics

## 2023-02-19 DIAGNOSIS — E03.9 ACQUIRED HYPOTHYROIDISM: ICD-10-CM

## 2023-02-21 RX ORDER — LEVOTHYROXINE SODIUM 137 MCG
TABLET ORAL
Qty: 90 TABLET | Refills: 3 | Status: SHIPPED | OUTPATIENT
Start: 2023-02-21 | End: 2024-01-19

## 2023-02-21 NOTE — TELEPHONE ENCOUNTER
Received request via: Pharmacy    Was the patient seen in the last year in this department? Yes  LOV : 1/17/2023   Does the patient have an active prescription (recently filled or refills available) for medication(s) requested? No    Does the patient have FPC Plus and need 100 day supply (blood pressure, diabetes and cholesterol meds only)? Patient does not have SCP

## 2023-04-05 ENCOUNTER — TELEPHONE (OUTPATIENT)
Dept: MEDICAL GROUP | Facility: LAB | Age: 68
End: 2023-04-05
Payer: MEDICARE

## 2023-04-05 SDOH — ECONOMIC STABILITY: TRANSPORTATION INSECURITY
IN THE PAST 12 MONTHS, HAS THE LACK OF TRANSPORTATION KEPT YOU FROM MEDICAL APPOINTMENTS OR FROM GETTING MEDICATIONS?: NO

## 2023-04-05 SDOH — HEALTH STABILITY: PHYSICAL HEALTH: ON AVERAGE, HOW MANY MINUTES DO YOU ENGAGE IN EXERCISE AT THIS LEVEL?: 30 MIN

## 2023-04-05 SDOH — HEALTH STABILITY: PHYSICAL HEALTH: ON AVERAGE, HOW MANY DAYS PER WEEK DO YOU ENGAGE IN MODERATE TO STRENUOUS EXERCISE (LIKE A BRISK WALK)?: 3 DAYS

## 2023-04-05 SDOH — ECONOMIC STABILITY: INCOME INSECURITY: HOW HARD IS IT FOR YOU TO PAY FOR THE VERY BASICS LIKE FOOD, HOUSING, MEDICAL CARE, AND HEATING?: NOT HARD AT ALL

## 2023-04-05 SDOH — ECONOMIC STABILITY: FOOD INSECURITY: WITHIN THE PAST 12 MONTHS, THE FOOD YOU BOUGHT JUST DIDN'T LAST AND YOU DIDN'T HAVE MONEY TO GET MORE.: NEVER TRUE

## 2023-04-05 SDOH — ECONOMIC STABILITY: INCOME INSECURITY: IN THE LAST 12 MONTHS, WAS THERE A TIME WHEN YOU WERE NOT ABLE TO PAY THE MORTGAGE OR RENT ON TIME?: NO

## 2023-04-05 SDOH — ECONOMIC STABILITY: TRANSPORTATION INSECURITY
IN THE PAST 12 MONTHS, HAS LACK OF RELIABLE TRANSPORTATION KEPT YOU FROM MEDICAL APPOINTMENTS, MEETINGS, WORK OR FROM GETTING THINGS NEEDED FOR DAILY LIVING?: NO

## 2023-04-05 SDOH — ECONOMIC STABILITY: HOUSING INSECURITY
IN THE LAST 12 MONTHS, WAS THERE A TIME WHEN YOU DID NOT HAVE A STEADY PLACE TO SLEEP OR SLEPT IN A SHELTER (INCLUDING NOW)?: NO

## 2023-04-05 SDOH — ECONOMIC STABILITY: FOOD INSECURITY: WITHIN THE PAST 12 MONTHS, YOU WORRIED THAT YOUR FOOD WOULD RUN OUT BEFORE YOU GOT MONEY TO BUY MORE.: NEVER TRUE

## 2023-04-05 SDOH — ECONOMIC STABILITY: HOUSING INSECURITY

## 2023-04-05 SDOH — HEALTH STABILITY: MENTAL HEALTH
STRESS IS WHEN SOMEONE FEELS TENSE, NERVOUS, ANXIOUS, OR CAN'T SLEEP AT NIGHT BECAUSE THEIR MIND IS TROUBLED. HOW STRESSED ARE YOU?: RATHER MUCH

## 2023-04-05 SDOH — ECONOMIC STABILITY: TRANSPORTATION INSECURITY
IN THE PAST 12 MONTHS, HAS LACK OF TRANSPORTATION KEPT YOU FROM MEETINGS, WORK, OR FROM GETTING THINGS NEEDED FOR DAILY LIVING?: NO

## 2023-04-05 ASSESSMENT — SOCIAL DETERMINANTS OF HEALTH (SDOH)
HOW OFTEN DO YOU ATTENT MEETINGS OF THE CLUB OR ORGANIZATION YOU BELONG TO?: MORE THAN 4 TIMES PER YEAR
WITHIN THE PAST 12 MONTHS, YOU WORRIED THAT YOUR FOOD WOULD RUN OUT BEFORE YOU GOT THE MONEY TO BUY MORE: NEVER TRUE
DO YOU BELONG TO ANY CLUBS OR ORGANIZATIONS SUCH AS CHURCH GROUPS UNIONS, FRATERNAL OR ATHLETIC GROUPS, OR SCHOOL GROUPS?: YES
HOW OFTEN DO YOU HAVE A DRINK CONTAINING ALCOHOL: 4 OR MORE TIMES A WEEK
HOW MANY DRINKS CONTAINING ALCOHOL DO YOU HAVE ON A TYPICAL DAY WHEN YOU ARE DRINKING: 1 OR 2
DO YOU BELONG TO ANY CLUBS OR ORGANIZATIONS SUCH AS CHURCH GROUPS UNIONS, FRATERNAL OR ATHLETIC GROUPS, OR SCHOOL GROUPS?: YES
IN A TYPICAL WEEK, HOW MANY TIMES DO YOU TALK ON THE PHONE WITH FAMILY, FRIENDS, OR NEIGHBORS?: TWICE A WEEK
HOW OFTEN DO YOU GET TOGETHER WITH FRIENDS OR RELATIVES?: ONCE A WEEK
HOW OFTEN DO YOU ATTEND CHURCH OR RELIGIOUS SERVICES?: MORE THAN 4 TIMES PER YEAR
HOW OFTEN DO YOU ATTEND CHURCH OR RELIGIOUS SERVICES?: MORE THAN 4 TIMES PER YEAR
IN A TYPICAL WEEK, HOW MANY TIMES DO YOU TALK ON THE PHONE WITH FAMILY, FRIENDS, OR NEIGHBORS?: TWICE A WEEK
HOW OFTEN DO YOU HAVE SIX OR MORE DRINKS ON ONE OCCASION: NEVER
HOW OFTEN DO YOU ATTENT MEETINGS OF THE CLUB OR ORGANIZATION YOU BELONG TO?: MORE THAN 4 TIMES PER YEAR
HOW HARD IS IT FOR YOU TO PAY FOR THE VERY BASICS LIKE FOOD, HOUSING, MEDICAL CARE, AND HEATING?: NOT HARD AT ALL
HOW OFTEN DO YOU GET TOGETHER WITH FRIENDS OR RELATIVES?: ONCE A WEEK

## 2023-04-05 ASSESSMENT — LIFESTYLE VARIABLES
HOW MANY STANDARD DRINKS CONTAINING ALCOHOL DO YOU HAVE ON A TYPICAL DAY: 1 OR 2
HOW OFTEN DO YOU HAVE A DRINK CONTAINING ALCOHOL: 4 OR MORE TIMES A WEEK
SKIP TO QUESTIONS 9-10: 1
HOW OFTEN DO YOU HAVE SIX OR MORE DRINKS ON ONE OCCASION: NEVER
AUDIT-C TOTAL SCORE: 4

## 2023-04-05 NOTE — TELEPHONE ENCOUNTER
Left message for patient to call back regarding pre-visit planning. Please transfer call to [phone]

## 2023-04-05 NOTE — TELEPHONE ENCOUNTER
ANNUAL WELLNESS VISIT PRE-VISIT PLANNING    1.  Reviewed notes from the last office visit: Yes    2.  If any orders were ordered or intended to be done prior to visit (i.e. 6 mos follow-up), do we have results/consult notes or has patient scheduled?          Labs - Labs were not ordered at last office visit.  Note: If patient appointment is for lab review and patient did not complete labs, check with provider if OK to reschedule patient until labs completed.         Imaging - Imaging ordered, completed and results are in chart.         Referrals - Referral ordered, patient was seen and consult notes are in chart. Care Teams updated  YES.    3.  Immunizations were updated in Epic using Reconcile Outside Information activity? Yes    4.  Patient is due for the following Health Maintenance Topics:   Health Maintenance Due   Topic Date Due    IMM PNEUMOCOCCAL VACCINE: 65+ Years (3 - PPSV23 if available, else PCV20) 02/06/2021    COVID-19 Vaccine (3 - Booster for Pfizer series) 05/17/2021    Annual Wellness Visit  02/24/2023     5.  Reviewed/Updated the following with patient:          Preferred Pharmacy? No          Preferred Lab? No          Preferred Communication? No          Allergies? No          Medications? NO    6.  Care Team Updated:          DME Company (gait device, O2, CPAP, etc.): NO          Other Specialists (eye doctor, derm, GYN, cardiology, endo, etc): NO    7.  Patient was not advised: “This is a free wellness visit. The provider will screen for medical conditions to help you stay healthy. If you have other concerns to address you may be asked to discuss these at a separate visit or there may be an additional fee.”     8.  AHA (Puls8) form printed for Provider? N/A

## 2023-04-07 ENCOUNTER — OFFICE VISIT (OUTPATIENT)
Dept: MEDICAL GROUP | Facility: LAB | Age: 68
End: 2023-04-07
Payer: MEDICARE

## 2023-04-07 ENCOUNTER — HOSPITAL ENCOUNTER (OUTPATIENT)
Dept: LAB | Facility: MEDICAL CENTER | Age: 68
End: 2023-04-07
Attending: NURSE PRACTITIONER
Payer: MEDICARE

## 2023-04-07 VITALS
HEIGHT: 60 IN | DIASTOLIC BLOOD PRESSURE: 72 MMHG | TEMPERATURE: 97.3 F | RESPIRATION RATE: 14 BRPM | WEIGHT: 193 LBS | OXYGEN SATURATION: 97 % | SYSTOLIC BLOOD PRESSURE: 114 MMHG | HEART RATE: 72 BPM | BODY MASS INDEX: 37.89 KG/M2

## 2023-04-07 DIAGNOSIS — E78.5 DYSLIPIDEMIA: ICD-10-CM

## 2023-04-07 DIAGNOSIS — K59.1 FUNCTIONAL DIARRHEA: ICD-10-CM

## 2023-04-07 DIAGNOSIS — R09.81 CONGESTION OF NASAL SINUS: ICD-10-CM

## 2023-04-07 DIAGNOSIS — E06.3 HASHIMOTO'S THYROIDITIS: ICD-10-CM

## 2023-04-07 DIAGNOSIS — Z00.00 PREVENTATIVE HEALTH CARE: ICD-10-CM

## 2023-04-07 DIAGNOSIS — C73 FOLLICULAR THYROID CANCER (HCC): ICD-10-CM

## 2023-04-07 PROBLEM — F32.0 CURRENT MILD EPISODE OF MAJOR DEPRESSIVE DISORDER WITHOUT PRIOR EPISODE (HCC): Status: RESOLVED | Noted: 2020-08-27 | Resolved: 2023-04-07

## 2023-04-07 PROBLEM — M16.11 PRIMARY OSTEOARTHRITIS OF RIGHT HIP: Status: RESOLVED | Noted: 2022-07-18 | Resolved: 2023-04-07

## 2023-04-07 PROBLEM — G89.18 POSTOPERATIVE PAIN: Status: RESOLVED | Noted: 2022-06-06 | Resolved: 2023-04-07

## 2023-04-07 PROBLEM — M25.551 RIGHT HIP PAIN: Status: RESOLVED | Noted: 2022-07-18 | Resolved: 2023-04-07

## 2023-04-07 PROBLEM — Z47.89 ORTHOPEDIC AFTERCARE: Status: RESOLVED | Noted: 2022-04-19 | Resolved: 2023-04-07

## 2023-04-07 LAB
BASOPHILS # BLD AUTO: 0.8 % (ref 0–1.8)
BASOPHILS # BLD: 0.04 K/UL (ref 0–0.12)
EOSINOPHIL # BLD AUTO: 0.1 K/UL (ref 0–0.51)
EOSINOPHIL NFR BLD: 1.9 % (ref 0–6.9)
ERYTHROCYTE [DISTWIDTH] IN BLOOD BY AUTOMATED COUNT: 46.4 FL (ref 35.9–50)
HCT VFR BLD AUTO: 43.3 % (ref 37–47)
HGB BLD-MCNC: 14.5 G/DL (ref 12–16)
IMM GRANULOCYTES # BLD AUTO: 0.02 K/UL (ref 0–0.11)
IMM GRANULOCYTES NFR BLD AUTO: 0.4 % (ref 0–0.9)
LYMPHOCYTES # BLD AUTO: 1.36 K/UL (ref 1–4.8)
LYMPHOCYTES NFR BLD: 26.4 % (ref 22–41)
MCH RBC QN AUTO: 31.4 PG (ref 27–33)
MCHC RBC AUTO-ENTMCNC: 33.5 G/DL (ref 33.6–35)
MCV RBC AUTO: 93.7 FL (ref 81.4–97.8)
MONOCYTES # BLD AUTO: 0.45 K/UL (ref 0–0.85)
MONOCYTES NFR BLD AUTO: 8.7 % (ref 0–13.4)
NEUTROPHILS # BLD AUTO: 3.18 K/UL (ref 2–7.15)
NEUTROPHILS NFR BLD: 61.8 % (ref 44–72)
NRBC # BLD AUTO: 0 K/UL
NRBC BLD-RTO: 0 /100 WBC
PLATELET # BLD AUTO: 254 K/UL (ref 164–446)
PMV BLD AUTO: 10.3 FL (ref 9–12.9)
RBC # BLD AUTO: 4.62 M/UL (ref 4.2–5.4)
T4 FREE SERPL-MCNC: 1.73 NG/DL (ref 0.93–1.7)
TSH SERPL DL<=0.005 MIU/L-ACNC: 4.29 UIU/ML (ref 0.38–5.33)
WBC # BLD AUTO: 5.2 K/UL (ref 4.8–10.8)

## 2023-04-07 PROCEDURE — 84443 ASSAY THYROID STIM HORMONE: CPT

## 2023-04-07 PROCEDURE — 99214 OFFICE O/P EST MOD 30 MIN: CPT | Performed by: NURSE PRACTITIONER

## 2023-04-07 PROCEDURE — 80053 COMPREHEN METABOLIC PANEL: CPT

## 2023-04-07 PROCEDURE — 85025 COMPLETE CBC W/AUTO DIFF WBC: CPT

## 2023-04-07 PROCEDURE — 84439 ASSAY OF FREE THYROXINE: CPT

## 2023-04-07 PROCEDURE — 36415 COLL VENOUS BLD VENIPUNCTURE: CPT

## 2023-04-07 PROCEDURE — 80061 LIPID PANEL: CPT

## 2023-04-07 RX ORDER — METHYLPREDNISOLONE 4 MG/1
TABLET ORAL
Qty: 21 TABLET | Refills: 0 | Status: SHIPPED | OUTPATIENT
Start: 2023-04-07 | End: 2023-04-25

## 2023-04-07 NOTE — PROGRESS NOTES
Subjective:     CC:   Chief Complaint   Patient presents with    Follow-Up     Thyroid levels, weight gain, right-side positional vertigo, sinus issues , sleep issues      HPI:   Corrinne presents today with the following:    Congestion  Ongoing for the last 2 months. Reports sinus pressure, sleep disturbance, congestion, shortness of breath, bloody mucus, dizziness.   Denies cough, runny nose, ear fullness, sore throat.   Has not been taking any OTC medications.     Functional diarrhea  Chronic condition. Patient reports that she has always had gut issues and had given up gluten with some relief of symptoms. Reports that recently her diarrhea has increased again even though she is still following a gluten free diet. Reports cramping pain and nausea after eating most days. Last colonoscopy was about 5 years with a 10 year recall. She is not established with gastroenterology.  Reports a family history of Crohn's Disease.     Hashimoto's thyroiditis  Labwork due. Hx of follicular thyroid cancer, was previously followed by endocrinology. Taking medicine as directed. Reports weight gain, changes in bowels.    ROS:   As documented in history of present illness above    Objective:     Exam: /72 (BP Location: Right arm, Patient Position: Sitting, BP Cuff Size: Adult)   Pulse 72   Temp 36.3 °C (97.3 °F)   Resp 14   Ht 1.524 m (5')   Wt 87.5 kg (193 lb)   SpO2 97%  Body mass index is 37.69 kg/m².    General: Normal appearing. No distress.  HEENT: Normocephalic. Eyes conjunctiva clear lids without ptosis, pupils equal and reactive to light accommodation, ears normal shape and contour, canals are clear bilaterally, tympanic membranes are benign.   Neck: Supple without JVD or bruit. Thyroid is not enlarged.  Pulmonary: Clear to ausculation.  Normal effort. No rales, ronchi, or wheezing.  Cardiovascular: Regular rate and rhythm without murmur. Carotid and radial pulses are intact and equal bilaterally.  Neurologic:  Grossly nonfocal  Lymph: No cervical or supraclavicular lymph nodes are palpable  Skin: Warm and dry.  No obvious lesions.  Musculoskeletal: Normal gait. No extremity cyanosis, clubbing, or edema.  Psych: Normal mood and affect. Alert and oriented x3. Judgment and insight is normal.    Assessment & Plan:     68 y.o. female with the following -     1. Congestion of nasal sinus  Recommend using nasal saline wash (i.e. Nedi-Pot), over-the-counter decongestants as needed and medication as prescribed. Tylenol or Motrin as needed for headache or discomfort. Supportive care, differential diagnoses, and indications for immediate follow-up discussed with patient. Pathogenesis of diagnosis discussed including typical length and natural progression. Instructed to return to clinic for any change in condition, further concerns, or worsening of symptoms.  - methylPREDNISolone (MEDROL DOSEPAK) 4 MG Tablet Therapy Pack; As directed on the packaging label.  Dispense: 21 Tablet; Refill: 0    2. Preventative health care  Labs ordered.   - CBC WITH DIFFERENTIAL; Future  - Comp Metabolic Panel; Future  - TSH; Future  - FREE THYROXINE; Future  - Lipid Profile; Future    3. Dyslipidemia  Chronic condition.  Labs as indicated.  Continue lifestyle modifications.  - Comp Metabolic Panel; Future  - Lipid Profile; Future    4. Hashimoto's thyroiditis  Continue thyroid medication.  Instructed patient to take on empty stomach with glass of water, 30 minutes prior to food or other medications.  Labs as indicated.  - TSH; Future  - FREE THYROXINE; Future  - Referral to Endocrinology    5. Functional diarrhea  Chronic condition. Labs as indicated. Referral placed to gastroenterology.   - Referral to Gastroenterology    6. Follicular thyroid cancer (HCC)  Referral placed to endocrinology.   - Referral to Endocrinology    HCC Gap Form    Comorbidity Diagnosis: Primary osteoarthritis of right hip  Assessment and plan: Chronic, worsening. Encouraged  healthy diet and physical activity changes with a goal of weight loss. Follow up at least annually. The comorbid condition is in remission  based on today's assessment. Continue current treatment plan. Follow up at least yearly.  Last edited 04/07/23 10:24 PDT by CARMENZA Segovia

## 2023-04-07 NOTE — ASSESSMENT & PLAN NOTE
Chronic condition. Patient reports that she has always had gut issues and had given up gluten with some relief of symptoms. Reports that recently her diarrhea has increased again even though she is still following a gluten free diet. Reports cramping pain and nausea after eating most days. Last colonoscopy was about 5 years with a 10 year recall. She is not established with gastroenterology.  Reports a family history of Crohn's Disease.

## 2023-04-07 NOTE — ASSESSMENT & PLAN NOTE
Labwork due. Hx of follicular thyroid cancer, was previously followed by endocrinology. Taking medicine as directed. Reports weight gain, changes in bowels.

## 2023-04-08 LAB
ALBUMIN SERPL BCP-MCNC: 4.2 G/DL (ref 3.2–4.9)
ALBUMIN/GLOB SERPL: 1.7 G/DL
ALP SERPL-CCNC: 74 U/L (ref 30–99)
ALT SERPL-CCNC: 26 U/L (ref 2–50)
ANION GAP SERPL CALC-SCNC: 12 MMOL/L (ref 7–16)
AST SERPL-CCNC: 23 U/L (ref 12–45)
BILIRUB SERPL-MCNC: 0.5 MG/DL (ref 0.1–1.5)
BUN SERPL-MCNC: 15 MG/DL (ref 8–22)
CALCIUM ALBUM COR SERPL-MCNC: 9.1 MG/DL (ref 8.5–10.5)
CALCIUM SERPL-MCNC: 9.3 MG/DL (ref 8.5–10.5)
CHLORIDE SERPL-SCNC: 111 MMOL/L (ref 96–112)
CHOLEST SERPL-MCNC: 203 MG/DL (ref 100–199)
CO2 SERPL-SCNC: 24 MMOL/L (ref 20–33)
CREAT SERPL-MCNC: 0.63 MG/DL (ref 0.5–1.4)
FASTING STATUS PATIENT QL REPORTED: NORMAL
GFR SERPLBLD CREATININE-BSD FMLA CKD-EPI: 97 ML/MIN/1.73 M 2
GLOBULIN SER CALC-MCNC: 2.5 G/DL (ref 1.9–3.5)
GLUCOSE SERPL-MCNC: 93 MG/DL (ref 65–99)
HDLC SERPL-MCNC: 61 MG/DL
LDLC SERPL CALC-MCNC: 127 MG/DL
POTASSIUM SERPL-SCNC: 4.5 MMOL/L (ref 3.6–5.5)
PROT SERPL-MCNC: 6.7 G/DL (ref 6–8.2)
SODIUM SERPL-SCNC: 147 MMOL/L (ref 135–145)
TRIGL SERPL-MCNC: 73 MG/DL (ref 0–149)

## 2023-04-10 ENCOUNTER — OFFICE VISIT (OUTPATIENT)
Dept: MEDICAL GROUP | Facility: LAB | Age: 68
End: 2023-04-10
Payer: MEDICARE

## 2023-04-10 VITALS
SYSTOLIC BLOOD PRESSURE: 118 MMHG | HEART RATE: 61 BPM | OXYGEN SATURATION: 99 % | HEIGHT: 64 IN | TEMPERATURE: 97 F | WEIGHT: 191 LBS | DIASTOLIC BLOOD PRESSURE: 80 MMHG | BODY MASS INDEX: 32.61 KG/M2 | RESPIRATION RATE: 12 BRPM

## 2023-04-10 DIAGNOSIS — C73 FOLLICULAR THYROID CANCER (HCC): ICD-10-CM

## 2023-04-10 DIAGNOSIS — E78.5 DYSLIPIDEMIA: ICD-10-CM

## 2023-04-10 DIAGNOSIS — R79.89 ABNORMAL THYROID BLOOD TEST: ICD-10-CM

## 2023-04-10 PROCEDURE — 99214 OFFICE O/P EST MOD 30 MIN: CPT | Performed by: FAMILY MEDICINE

## 2023-04-10 RX ORDER — AZITHROMYCIN 500 MG/1
500 TABLET, FILM COATED ORAL
COMMUNITY
End: 2023-04-10 | Stop reason: SDUPTHER

## 2023-04-10 RX ORDER — AZITHROMYCIN 500 MG/1
500 TABLET, FILM COATED ORAL
Qty: 1 TABLET | Refills: 3 | Status: SHIPPED | OUTPATIENT
Start: 2023-04-10

## 2023-04-10 ASSESSMENT — FIBROSIS 4 INDEX: FIB4 SCORE: 1.19

## 2023-04-10 NOTE — PROGRESS NOTES
Subjective:     Chief Complaint   Patient presents with    Establish Care         HPI:   Corrinne presents today to establish care.     Hashimotos diagnosed first in the 90's. Was put on synthroid.     Last year had follicular thyroid cancer diagnosed. Partial thyroidectomy. No dosing changes then.   Labs done recently show TSH on the upper limit of normal but also T4 elevated.  She has been noticing some increased diarrhea of late as well.  Since given the Medrol Dosepak she uses this is calm down quite a bit.  She also is having some bouts where she is waking in the middle of the night and she does not know what for.    Diet: pretty healthy.   Exercise: Gym every other day recently. Some chest pain at rest, on and off, cant find trigger. None with activity. No palpitations.     Some heart h/o in the family.   Lots of thyroid issues in the family.   Mom with diabetes.         Current Outpatient Medications Ordered in Epic   Medication Sig Dispense Refill    methylPREDNISolone (MEDROL DOSEPAK) 4 MG Tablet Therapy Pack As directed on the packaging label. 21 Tablet 0    SYNTHROID 137 MCG Tab TAKE 1 TABLET BY MOUTH EVERY DAY IN THE MORNING ON AN EMPTY STOMACH 90 Tablet 3    omeprazole (PRILOSEC) 40 MG delayed-release capsule Take 40 mg by mouth every day. EVERY OTHER DAY      albuterol 108 (90 Base) MCG/ACT Aero Soln inhalation aerosol Inhale 2 Puffs every four hours as needed for Shortness of Breath (cough and wheezing). 1 Each 3    Non Formulary Request Take  by mouth 1 time a day as needed. CBD GUMMIES       No current Epic-ordered facility-administered medications on file.         ROS:  Gen: no fevers/chills, no changes in weight  Eyes: no changes in vision  ENT: no sore throat, no hearing loss, no bloody nose  Pulm: no sob, no cough  CV: no chest pain, no palpitations  GI: no nausea/vomiting, no diarrhea  : no dysuria  MSk: no myalgias  Skin: no rash  Neuro: no headaches, no numbness/tingling  Heme/Lymph: no  "easy bruising      Objective:     Exam:  /80 (BP Location: Right arm, Patient Position: Sitting, BP Cuff Size: Large adult)   Pulse 61   Temp 36.1 °C (97 °F)   Resp 12   Ht 1.618 m (5' 3.7\")   Wt 86.6 kg (191 lb)   SpO2 99%   BMI 33.09 kg/m²  Body mass index is 33.09 kg/m².    Gen: Alert and oriented, No apparent distress.  Neck: Neck is supple without lymphadenopathy.  Lungs: Normal effort, CTA bilaterally, no wheezes, rhonchi, or rales  CV: Regular rate and rhythm. No murmurs, rubs, or gallops.  Ext: No clubbing, cyanosis, edema.      Assessment & Plan:     68 y.o. female with the following -     1. Abnormal thyroid blood test  Discussed concern for this abnormal blood work recently with the thyroid.  Certainly this could be in response to some recent diarrhea but also could be a cause for the diarrhea.  Like to go ahead and get updated imaging given her history of malignancy.  She does have a referral to endocrine already pending.  We will treat as needed depending upon what we find.  - US-THYROID; Future    2. Follicular thyroid cancer (HCC)  History of hemithyroidectomy.  There is any concerns with most recent imaging we may consider full removal.    3. Dyslipidemia  Discussed recent lab work and slightly elevated cholesterol.  She does not have any other risk factors like diabetes or smoking history so her levels are really not that concerning.  We did discuss cutting down carbs and processed foods as well.  May consider CT scan coronary artery scoring in the future as needed.            No follow-ups on file.    Please note that this dictation was created using voice recognition software. I have made every reasonable attempt to correct obvious errors, but I expect that there are errors of grammar and possibly content that I did not discover before finalizing the note.        "

## 2023-04-12 ENCOUNTER — HOSPITAL ENCOUNTER (OUTPATIENT)
Dept: RADIOLOGY | Facility: MEDICAL CENTER | Age: 68
End: 2023-04-12
Attending: FAMILY MEDICINE
Payer: MEDICARE

## 2023-04-12 DIAGNOSIS — R79.89 ABNORMAL THYROID BLOOD TEST: ICD-10-CM

## 2023-04-12 PROCEDURE — 76536 US EXAM OF HEAD AND NECK: CPT

## 2023-04-20 ENCOUNTER — TELEPHONE (OUTPATIENT)
Dept: MEDICAL GROUP | Facility: LAB | Age: 68
End: 2023-04-20
Payer: MEDICARE

## 2023-04-20 NOTE — TELEPHONE ENCOUNTER
ANNUAL WELLNESS VISIT PRE-VISIT PLANNING    1.  Reviewed notes from the last office visit: Yes    2.  If any orders were ordered or intended to be done prior to visit (i.e. 6 mos follow-up), do we have results/consult notes or has patient scheduled?          Labs - Labs were not ordered at last office visit.  Note: If patient appointment is for lab review and patient did not complete labs, check with provider if OK to reschedule patient until labs completed.         Imaging - Imaging ordered, completed and results are in chart.         Referrals - Referral ordered, patient was seen and consult notes are in chart. Care Teams updated  YES. Scheduled with Endo    3.  Immunizations were updated in Epic using Reconcile Outside Information activity? Yes    4.  Patient is due for the following Health Maintenance Topics:   Health Maintenance Due   Topic Date Due    IMM PNEUMOCOCCAL VACCINE: 65+ Years (3 - PPSV23 if available, else PCV20) 02/06/2021    COVID-19 Vaccine (3 - Booster for Pfizer series) 05/17/2021    Annual Wellness Visit  02/24/2023   5.  Reviewed/Updated the following with patient:          Preferred Pharmacy? Yes          Preferred Lab? Yes          Preferred Communication? Yes          Allergies? Yes          Medications? YES. Was Abstract Encounter opened and chart updated? YES    6.  Care Team Updated:          DME Company (gait device, O2, CPAP, etc.): N\A          Other Specialists (eye doctor, derm, GYN, cardiology, endo, etc): YES    7.  Patient was advised: “This is a free wellness visit. The provider will screen for medical conditions to help you stay healthy. If you have other concerns to address you may be asked to discuss these at a separate visit or there may be an additional fee.”     8.  AHA (Puls8) form printed for Provider? N/A

## 2023-04-25 ENCOUNTER — OFFICE VISIT (OUTPATIENT)
Dept: MEDICAL GROUP | Facility: LAB | Age: 68
End: 2023-04-25
Payer: MEDICARE

## 2023-04-25 VITALS
DIASTOLIC BLOOD PRESSURE: 78 MMHG | OXYGEN SATURATION: 97 % | WEIGHT: 190 LBS | BODY MASS INDEX: 32.44 KG/M2 | HEART RATE: 79 BPM | SYSTOLIC BLOOD PRESSURE: 122 MMHG | RESPIRATION RATE: 12 BRPM | TEMPERATURE: 98.3 F | HEIGHT: 64 IN

## 2023-04-25 DIAGNOSIS — R42 VERTIGO: ICD-10-CM

## 2023-04-25 DIAGNOSIS — Z00.00 ENCOUNTER FOR MEDICARE ANNUAL WELLNESS EXAM: ICD-10-CM

## 2023-04-25 DIAGNOSIS — Z23 NEED FOR VACCINATION: ICD-10-CM

## 2023-04-25 DIAGNOSIS — Z12.31 ENCOUNTER FOR SCREENING MAMMOGRAM FOR MALIGNANT NEOPLASM OF BREAST: ICD-10-CM

## 2023-04-25 DIAGNOSIS — B00.9 HSV INFECTION: ICD-10-CM

## 2023-04-25 PROCEDURE — G0439 PPPS, SUBSEQ VISIT: HCPCS | Mod: 25 | Performed by: FAMILY MEDICINE

## 2023-04-25 PROCEDURE — 90677 PCV20 VACCINE IM: CPT | Performed by: FAMILY MEDICINE

## 2023-04-25 PROCEDURE — G0009 ADMIN PNEUMOCOCCAL VACCINE: HCPCS | Performed by: FAMILY MEDICINE

## 2023-04-25 RX ORDER — ACYCLOVIR 400 MG/1
400 TABLET ORAL 3 TIMES DAILY
Qty: 30 TABLET | Refills: 3 | Status: SHIPPED | OUTPATIENT
Start: 2023-04-25

## 2023-04-25 ASSESSMENT — ACTIVITIES OF DAILY LIVING (ADL): BATHING_REQUIRES_ASSISTANCE: 0

## 2023-04-25 ASSESSMENT — FIBROSIS 4 INDEX: FIB4 SCORE: 1.19

## 2023-04-25 ASSESSMENT — PATIENT HEALTH QUESTIONNAIRE - PHQ9: CLINICAL INTERPRETATION OF PHQ2 SCORE: 0

## 2023-04-25 ASSESSMENT — ENCOUNTER SYMPTOMS: GENERAL WELL-BEING: GOOD

## 2023-04-25 NOTE — PROGRESS NOTES
Chief Complaint   Patient presents with    Annual Exam       HPI:  Corrinne is a 68 y.o. here for Medicare Annual Wellness Visit    Diet: pretty healthy.   Exercise: Gym every other day recently. Some chest pain at rest, on and off, cant find trigger. None with activity. No palpitations      Some travel of late.   Sees Endocrine as well.   Sees GI, [facility].   Sees Derm.   Did have relief of chronic diarrhea for 5 days with prednisone pack.   Patient Active Problem List    Diagnosis Date Noted    Functional diarrhea 04/07/2023    Trigger finger of right thumb 03/21/2022    Trigger finger, right middle finger 03/21/2022    History of DVT (deep vein thrombosis) 11/24/2021    History of COVID-19 11/08/2021    Bilateral carpal tunnel syndrome 08/04/2021    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    Lumbar radiculopathy 05/26/2020    Mild intermittent asthma without complication 05/14/2020    Dense breast tissue on mammogram 05/14/2020    Spondylolisthesis of lumbar region 01/07/2020    Obesity (BMI 30-39.9) 05/09/2017    Endometriosis of vagina 09/19/2016    Hashimoto's thyroiditis 09/19/2016    Vitamin D deficiency 09/19/2016    Dyslipidemia 09/19/2016    Migraine without aura and without status migrainosus, not intractable 09/19/2016    HSV infection 09/19/2016    Knee osteoarthritis 09/12/2014    S/P rotator cuff repair 02/27/2014       Current Outpatient Medications   Medication Sig Dispense Refill    acyclovir (ZOVIRAX) 400 MG tablet Take 1 Tablet by mouth 3 times a day. 30 Tablet 3    azithromycin (ZITHROMAX) 500 MG tablet Take 1 Tablet by mouth one time before surgery. 1 Tablet 3    SYNTHROID 137 MCG Tab TAKE 1 TABLET BY MOUTH EVERY DAY IN THE MORNING ON AN EMPTY STOMACH 90 Tablet 3    omeprazole (PRILOSEC) 40 MG delayed-release capsule Take 40 mg by mouth every day. EVERY OTHER DAY      Non Formulary Request Take  by mouth 1 time a day as needed. CBD GUMMIES      albuterol 108 (90 Base) MCG/ACT Aero Soln  inhalation aerosol Inhale 2 Puffs every four hours as needed for Shortness of Breath (cough and wheezing). 1 Each 3     No current facility-administered medications for this visit.        Patient is taking medications as noted in medication list.  Current supplements as per medication list.     Allergies: Gluten, Morphine, Pcn [penicillins], and Percocet [oxycodone-acetaminophen]    Current social contact/activities: active     Is patient current with immunizations? Yes.    She  reports that she quit smoking about 42 years ago. Her smoking use included cigarettes. She has a 2.50 pack-year smoking history. She has never used smokeless tobacco. She reports current alcohol use of about 2.5 oz per week. She reports that she does not currently use drugs after having used the following drugs: Oral.  Counseling given: Not Answered      ROS:    Gait: Uses no assistive device   Ostomy: No   Other tubes: No   Amputations: No   Chronic oxygen use No   Last eye exam  2022  Wears hearing aids: No   : Denies any urinary leakage during the last 6 months    Screening:    Depression Screening  Little interest or pleasure in doing things?  0 - not at all  Feeling down, depressed, or hopeless? 0 - not at all  Trouble falling or staying asleep, or sleeping too much?     Feeling tired or having little energy?     Poor appetite or overeating?     Feeling bad about yourself - or that you are a failure or have let yourself or your family down?    Trouble concentrating on things, such as reading the newspaper or watching television?    Moving or speaking so slowly that other people could have noticed.  Or the opposite - being so fidgety or restless that you have been moving around a lot more than usual?     Thoughts that you would be better off dead, or of hurting yourself?     Patient Health Questionnaire Score:      If depressive symptoms identified deferred to follow up visit unless specifically addressed in assessment and  plan.    Interpretation of PHQ-9 Total Score   Score Severity   1-4 No Depression   5-9 Mild Depression   10-14 Moderate Depression   15-19 Moderately Severe Depression   20-27 Severe Depression    Screening for Cognitive Impairment  Three Minute Recall (Banana, Sunrise, Chair)  3/3    Draw clock face with all 12 numbers and set the hands to show 20 past 8.  Yes    If cognitive concerns identified, deferred for follow up unless specifically addressed in assessment and plan.    Fall Risk Assessment  Has the patient had two or more falls in the last year or any fall with injury in the last year?  No  If fall risk identified, deferred for follow up unless specifically addressed in assessment and plan.    Safety Assessment  Throw rugs on floor.  No  Handrails on all stairs.  Yes  Good lighting in all hallways.  Yes  Difficulty hearing.  No  Patient counseled about all safety risks that were identified.    Functional Assessment ADLs  Are there any barriers preventing you from cooking for yourself or meeting nutritional needs?  No.    Are there any barriers preventing you from driving safely or obtaining transportation?  No.    Are there any barriers preventing you from using a telephone or calling for help?  No.    Are there any barriers preventing you from shopping?  No.    Are there any barriers preventing you from taking care of your own finances?  No.    Are there any barriers preventing you from managing your medications?  No.    Are there any barriers preventing you from showering, bathing or dressing yourself?  No.    Are you currently engaging in any exercise or physical activity?  Yes.     What is your perception of your health?  Good.    Advance Care Planning  Do you have an Advance Directive, Living Will, Durable Power of , or POLST?  Yes, not on file.                Health Maintenance Summary            Ordered - IMM PNEUMOCOCCAL VACCINE: 65+ Years (3 - PPSV23 if available, else PCV20) Ordered on  4/25/2023      10/25/2015  Imm Admin: Pneumococcal Conjugate Vaccine (Prevnar/PCV-13)    07/11/2005  Imm Admin: Pneumococcal polysaccharide vaccine (PPSV-23)              Overdue - COVID-19 Vaccine (3 - Booster for Pfizer series) Overdue since 5/17/2021 03/22/2021  Imm Admin: PFIZER PURPLE CAP SARS-COV-2 VACCINATION (12+)    03/01/2021  Imm Admin: PFIZER PURPLE CAP SARS-COV-2 VACCINATION (12+)              Ordered - MAMMOGRAM (Yearly) Ordered on 4/25/2023 03/22/2022  MA-SCREENING MAMMO BILAT W/TOMOSYNTHESIS W/CAD    08/15/2020  Done    06/25/2020  MA-DIAGNOSTIC MAMMO LEFT W/TOMOSYNTHESIS W/O CAD    06/17/2020  MA-SCREENING MAMMO BILAT W/TOMOSYNTHESIS W/CAD    02/25/2019  MA-SCREENING MAMMO BILAT W/TOMOSYNTHESIS W/CAD    Only the first 5 history entries have been loaded, but more history exists.              IMM INFLUENZA (Season Ended) Next due on 9/1/2023      10/26/2021  Imm Admin: Influenza Vaccine Adult HD    09/23/2020  Imm Admin: Influenza Vaccine Quad Recombinant    10/09/2019  Imm Admin: Influenza Vaccine Quad Recombinant    10/01/2018  Imm Admin: Influenza Vaccine Quad Recombinant    12/06/2017  Imm Admin: Influenza (IM) Preservative Free - HISTORICAL DATA    Only the first 5 history entries have been loaded, but more history exists.              BONE DENSITY (Every 2 Years) Next due on 3/22/2024      03/22/2022  DS-BONE DENSITY STUDY (DEXA)    05/23/2017  DS-BONE DENSITY STUDY (DEXA)    05/23/2017  DS-BONE DENSITY STUDY (DEXA)              Annual Wellness Visit (Every 366 Days) Next due on 4/25/2024 04/25/2023  Visit Dx: Encounter for Medicare annual wellness exam    02/23/2022  Level of Service: INITIAL ANNUAL WELLNESS VISIT-INCLUDES PPPS    02/23/2022  Visit Dx: Encounter for Medicare annual wellness exam              IMM DTaP/Tdap/Td Vaccine (2 - Td or Tdap) Next due on 8/14/2025 08/14/2015  Imm Admin: Tdap Vaccine              COLORECTAL CANCER SCREENING (COLONOSCOPY - Every 10  Years) Next due on 2027  OCCULT BLOOD FECES IMMUNOASSAY    2017  REFERRAL TO GI FOR COLONOSCOPY              IMM ZOSTER VACCINES (Series Information) Completed      2021  Imm Admin: Zoster Vaccine Recombinant (RZV) (SHINGRIX)    2020  Imm Admin: Zoster Vaccine Recombinant (RZV) (SHINGRIX)              HEPATITIS C SCREENING  Completed      2022  HEP C VIRUS ANTIBODY              IMM HEP B VACCINE (Series Information) Aged Out      No completion history exists for this topic.              IMM MENINGOCOCCAL ACWY VACCINE (Series Information) Aged Out      No completion history exists for this topic.              Discontinued - CERVICAL CANCER SCREENING  Discontinued        Frequency changed to Never automatically (Topic No Longer Applies)    2020  THINPREP PAP WITH HPV    2020  Pathology Gynecology Specimen    2017  THINPREP PAP WITH HPV    2017  PATHOLOGY GYN SPECIMEN                    Patient Care Team:  Peyton Miramontes M.D. as PCP - General (Family Medicine)  [facility]  UZMA Villarreal (Orthopedic Surgery)  Sin Soto M.D. (Orthopedic Surgery)  Nikolai Anaya M.D. (Otolaryngology)  Major Webb M.D. (Dermatology)  Seun Fleming M.D. (Orthopedic Surgery)    Social History     Tobacco Use    Smoking status: Former     Packs/day: 0.25     Years: 10.00     Pack years: 2.50     Types: Cigarettes     Quit date: 1980     Years since quittin.4    Smokeless tobacco: Never   Vaping Use    Vaping Use: Never used   Substance Use Topics    Alcohol use: Yes     Alcohol/week: 2.5 oz     Types: 5 Standard drinks or equivalent per week     Comment: occas    Drug use: Not Currently     Types: Oral     Comment: CBD gummies daily     Family History   Problem Relation Age of Onset    Diabetes Mother     Arthritis Mother     Lung Disease Mother         COPD    Cancer Mother         cervical    Heart Disease  Father     Alcohol/Drug Father     Cancer Sister         thyroid    Heart Disease Brother      She  has a past medical history of Anesthesia, Arthritis, Asthma (07/11/2014), Blood clotting disorder (HCC) (11/24/2021), Breath shortness, Bronchitis, Cancer (HCC) (08/04/2021), Carpal tunnel syndrome on left (10/13/2021), Chest pain (07/10/2014), COVID-19 (11/08/2021), Current mild episode of major depressive disorder without prior episode (HCC) (8/27/2020), Endometriosis of vagina (09/19/2016), Epigastric pain (10/09/2018), Episodic altered awareness (10/13/2020), Follicular thyroid cancer (HCC) (08/04/2021), H/O migraine (07/11/2014), High cholesterol, Hoarseness or changing voice (01/11/2021), HSV infection (09/19/2016), Hyperlipidemia (09/19/2016), Hyperthyroidism, Hypokalemia (07/11/2014), Knee osteoarthritis (09/12/2014), Lymphocytic thyroiditis (09/19/2016), Memory change (10/13/2020), Memory changes (08/27/2020), Migraine without aura and without status migrainosus, not intractable (09/19/2016), Orthopedic aftercare (4/19/2022), Peptic ulcer (08/14/2019), Postoperative pain (6/6/2022), Primary osteoarthritis of right hip (7/18/2022), Psychiatric problem, Renal colic (09/19/2016), Renal disorder (2016), Right groin pain (06/03/2022), Right hip pain (7/18/2022), Rotator cuff tear (01/30/2014), Shoulder impingement (01/16/2014), Thyroid goiter (04/19/2021), and Vitamin D deficiency (09/19/2016).    She has no past medical history of Arrhythmia, Cataract, Cold, Congestive heart failure (HCC), Coughing blood, Dental disorder, Glaucoma, Heart burn, Heart valve disease, Hepatitis A, Hepatitis B, Hepatitis C, Hypertension, Indigestion, Myocardial infarct (HCC), Other and unspecified angina pectoris, Other emphysema ([facility]), Pacemaker, Pneumonia, Rheumatic fever, Seizure (HCC), Sleep apnea, Snoring, Tuberculosis, Type II or unspecified type diabetes mellitus without mention of complication, not stated as uncontrolled,  "Unspecified hemorrhagic conditions, or Unspecified urinary incontinence.   Past Surgical History:   Procedure Laterality Date    NM TOTAL HIP ARTHROPLASTY Right 10/17/2022    Procedure: RIGHT HIP TOTAL ARTHROPLASTY;  Surgeon: Sin Soto M.D.;  Location: [facility];  Service: Orthopedics    NM LAP,INGUINAL HERNIA REPR,INITIAL Bilateral 6/6/2022    Procedure: REPAIR, HERNIA, INGUINAL, ROBOT-ASSISTED, USING DA DAMARI XI - FOR FEMORAL BILATERAL;  Surgeon: Johnny Mcmahon M.D.;  Location: SURGERY [facility];  Service: Gen Robotic    NM NEUROPLASTY & OR TRANSPOS MEDIAN NRV CARPAL DEYVI Right 04/19/2022    Procedure: RIGHT OPEN CARPAL TUNNEL RELEASE;  Surgeon: Seun Fleming M.D.;  Location: [facility];  Service: Orthopedics    PB INCISE FINGER TENDON SHEATH Right 04/19/2022    Procedure: RIGHT THUMB AND MIDDLE FINGER TRIGGER RELEASE;  Surgeon: Seun Fleming M.D.;  Location: [facility];  Service: Orthopedics    THYROID LOBECTOMY  08/2021    COLONOSCOPY  2021    KNEE ARTHROPLASTY TOTAL Left 10/23/2015    Procedure: LEFT TOTAL KNEE REPLACEMENT;  Surgeon: Leeroy Camacho M.D.;  Location: SURGERY [facility];  Service:     KNEE ARTHROPLASTY TOTAL  09/12/2014    Performed by Leeroy Camacho M.D. at SURGERY [facility]    SHOULDER ARTHROSCOPY Left 2013    SHOULDER ARTHROSCOPY Right 2012    TIFFANY BY LAPAROSCOPY  2009    PRIMARY C SECTION  10/31/1992    LUMPECTOMY Right 1972    benign    NM ANESTH,NANCY DEL AFTER NEURAXIAL ANESTH      TONSILLECTOMY      as child       Exam:   /78 (BP Location: Left arm, Patient Position: Sitting, BP Cuff Size: Adult)   Pulse 79   Temp 36.8 °C (98.3 °F)   Resp 12   Ht 1.618 m (5' 3.7\")   Wt 86.2 kg (190 lb)   SpO2 97%  Body mass index is 32.92 kg/m².    Hearing excellent.    Dentition good  Alert, oriented in no acute distress  Eye contact is good, speech goal directed, affect calm  RRR, CTAB  No edema.     Assessment and Plan. " The following treatment and monitoring plan is recommended:    1. Encounter for Medicare annual wellness exam    2. HSV infection  - acyclovir (ZOVIRAX) 400 MG tablet; Take 1 Tablet by mouth 3 times a day.  Dispense: 30 Tablet; Refill: 3    3. Need for vaccination  - Pneumococcal Conjugate Vaccine 20-Valent (19 yrs+)    4. Encounter for screening mammogram for malignant neoplasm of breast  - MA-SCREENING MAMMO BILAT W/TOMOSYNTHESIS W/CAD; Future       Services suggested: No services needed at this time  Health Care Screening recommendations as per orders if indicated.  Referrals offered: PT/OT/Nutrition counseling/Behavioral Health/Smoking cessation as per orders if indicated.    Discussion today about general wellness and lifestyle habits:    Prevent falls and reduce trip hazards; Cautioned about securing or removing rugs.  Have a working fire alarm and carbon monoxide detector;   Engage in regular physical activity and social activities.     Follow-up: No follow-ups on file.

## 2023-05-02 ENCOUNTER — HOSPITAL ENCOUNTER (OUTPATIENT)
Dept: RADIOLOGY | Facility: MEDICAL CENTER | Age: 68
End: 2023-05-02
Attending: FAMILY MEDICINE
Payer: MEDICARE

## 2023-05-02 DIAGNOSIS — Z12.31 ENCOUNTER FOR SCREENING MAMMOGRAM FOR MALIGNANT NEOPLASM OF BREAST: ICD-10-CM

## 2023-05-02 PROCEDURE — 77063 BREAST TOMOSYNTHESIS BI: CPT

## 2023-05-22 ENCOUNTER — HOSPITAL ENCOUNTER (OUTPATIENT)
Dept: LAB | Facility: MEDICAL CENTER | Age: 68
End: 2023-05-22
Payer: MEDICARE

## 2023-05-22 ENCOUNTER — OFFICE VISIT (OUTPATIENT)
Dept: ENDOCRINOLOGY | Facility: MEDICAL CENTER | Age: 68
End: 2023-05-22
Payer: MEDICARE

## 2023-05-22 VITALS
SYSTOLIC BLOOD PRESSURE: 106 MMHG | OXYGEN SATURATION: 99 % | HEIGHT: 64 IN | DIASTOLIC BLOOD PRESSURE: 70 MMHG | HEART RATE: 88 BPM | BODY MASS INDEX: 33.8 KG/M2 | WEIGHT: 198 LBS

## 2023-05-22 DIAGNOSIS — E06.3 HASHIMOTO'S THYROIDITIS: ICD-10-CM

## 2023-05-22 DIAGNOSIS — C73 FOLLICULAR THYROID CANCER (HCC): ICD-10-CM

## 2023-05-22 DIAGNOSIS — E55.9 VITAMIN D DEFICIENCY: ICD-10-CM

## 2023-05-22 LAB
25(OH)D3 SERPL-MCNC: 36 NG/ML (ref 30–100)
T3FREE SERPL-MCNC: 2.71 PG/ML (ref 2–4.4)
T4 FREE SERPL-MCNC: 1.57 NG/DL (ref 0.93–1.7)
TSH SERPL DL<=0.005 MIU/L-ACNC: 3.98 UIU/ML (ref 0.38–5.33)

## 2023-05-22 PROCEDURE — 84432 ASSAY OF THYROGLOBULIN: CPT

## 2023-05-22 PROCEDURE — 84439 ASSAY OF FREE THYROXINE: CPT

## 2023-05-22 PROCEDURE — 84443 ASSAY THYROID STIM HORMONE: CPT

## 2023-05-22 PROCEDURE — 84481 FREE ASSAY (FT-3): CPT

## 2023-05-22 PROCEDURE — 82306 VITAMIN D 25 HYDROXY: CPT

## 2023-05-22 PROCEDURE — 3074F SYST BP LT 130 MM HG: CPT

## 2023-05-22 PROCEDURE — 36415 COLL VENOUS BLD VENIPUNCTURE: CPT

## 2023-05-22 PROCEDURE — 3078F DIAST BP <80 MM HG: CPT

## 2023-05-22 PROCEDURE — 99214 OFFICE O/P EST MOD 30 MIN: CPT

## 2023-05-22 PROCEDURE — 99212 OFFICE O/P EST SF 10 MIN: CPT

## 2023-05-22 PROCEDURE — 86800 THYROGLOBULIN ANTIBODY: CPT

## 2023-05-22 ASSESSMENT — FIBROSIS 4 INDEX: FIB4 SCORE: 1.19

## 2023-05-22 NOTE — PROGRESS NOTES
Chief Complaint: Consult requested by Peyton Miramontes M.D. for evaluation of Hypothyroidism    HPI:     Corrinne Lynne Cibulsky is a 68 y.o. female with history of Hashimoto's disease diagnosed on 1996 and is here for initial evaluation. 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     1.Hashimoto's  She reports the following symptoms:fatigue, weight gain, feeling cold and cold intolerance, sweating and feeling slow which has been present for  5  months.     She denies constipation, swelling, losing hair, anxiousness, feeling excessive energy, tremulousness, palpitations, and weight loss.      She denies lumps   She reports dry cough,   She denies hoarseness, difficulty swallowing    She reports a family history of mother had hyperthyroidism and sister had thyroid cancer  denies any h/o radiation to head or neck region    She is currently on Synthroid 137 mcg daily which has been her thyroid hormone dose since 3 years.       She denies any recent dose changes.     She reports Good compliance and takes her thyroid hormone daily before breakfast.      She  taking omeprazole antacid.   She denies taking biotin and multivitamin   Latest Reference Range & Units 04/07/23 11:07   TSH 0.380 - 5.330 uIU/mL 4.290   Free T-4 0.93 - 1.70 ng/dL 1.73 (H)     2. Thyroid nodule s/p left lobectomy  was diagnosed in March 2021  Surgery was done in August of 2022  -  Thyroid (Jan 2021): Right thyroid lobe measures 0.46 x 1.34 x 0.58 cm and left thyroid lobe measures 1.08 x 2.07 x 1.05 cm   Left mid thyroid nodule, solid, hypoechoic, 1.28 x 0.85 x 1.1 cm  - Feb 2021: FNA of left thyroid nodule done and pathology reported AUS with [facility] reporting suspicious and PAX8/PPARG positive picks adjusted risk of cancer of 75%. This genomic alteration is associated with follicular   neoplasms (FA, NIFTP, FVPTC, FTC) and a TROY-like or   Non-BRAF-Non-TROY-like profile, which includes rates of lymph node   metastases and extrathyroidal  extension that are lower than BRAF   V600E-like neoplasms as per report  - March 2021: Ultrasound of soft tissues of head and neck showed no cervical adenopathy  - April 2021: Left lobectomy performed by Dr. 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 The majority of the tumor showed a thin overall intact capsule, focally tumor extends into but does not clearly penetrate to the tumor capsule. No vascular invasion identified. Findings are insufficient for definitive diagnosis of malignancy as per report.  - Oct 2021: Tg <0.5, Tg Ab 6.8  - US thyroid (Nov 2021): showed left thyroidectomy and atrophic right thyroid lobe measures 1.4x0.4x0.8cm without any nodules and normal appearing cervical LNs <1 cm    -US of thyroid on 4/12/23: The right lobe of the thyroid gland measures 0.6 cm x 2.4 cm x 0.8 cm. On comparison it measured 0.5 x 1.3 x 0.6 cm. The contour and echogenicity are normal. No focal mass lesions are identified.     The left lobe of the thyroid gland has been resected with no residual thyroid tissue seen.     No lymphadenopathy is noted   Latest Reference Range & Units 10/27/21 09:20   Thyroglobulin by LC-MC/MS-S/P 1.3 - 31.8 ng/mL <0.5 (L)      Latest Reference Range & Units 10/27/21 09:20   Anti-Thyroglobulin 0.0 - 4.0 IU/mL 6.8 (H)     3. Vitamin D deficiency  Currently not on supplements   Latest Reference Range & Units 09/23/20 09:44   25-Hydroxy   Vitamin D 25 30 - 100 ng/mL 34     Patient's medications, allergies, and social histories were reviewed and updated as appropriate.      ROS:     CONS:     No fever, no chills, no weight loss, no fatigue   EYES:      No diplopia, no blurry vision, no redness of eyes, no swelling of eyelids   ENT:    No hearing loss, No ear pain, No sore throat, no dysphagia, no neck swelling   CV:     No chest pain, no palpitations, no claudication, no orthopnea, no  PND   PULM:    No SOB, no cough, no hemoptysis, no wheezing    GI:   No nausea, no vomiting, no diarrhea, no constipation, no bloody stools   :  Passing urine well, no dysuria, no hematuria   ENDO:   No polyuria, no polydipsia, no heat intolerance, no cold intolerance   NEURO: No headaches, no dizziness, no convulsions, no tremors   MUSC:  No joint swellings, no arthralgias, no myalgias, no weakness   SKIN:   No rash, no ulcers, no dry skin   PSYCH:   No depression, no anxiety, no difficulty sleeping       Past Medical History:  Patient Active Problem List    Diagnosis Date Noted    Functional diarrhea 04/07/2023    Trigger finger of right thumb 03/21/2022    Trigger finger, right middle finger 03/21/2022    History of DVT (deep vein thrombosis) 11/24/2021    History of COVID-19 11/08/2021    Bilateral carpal tunnel syndrome 08/04/2021    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    Lumbar radiculopathy 05/26/2020    Mild intermittent asthma without complication 05/14/2020    Dense breast tissue on mammogram 05/14/2020    Spondylolisthesis of lumbar region 01/07/2020    Obesity (BMI 30-39.9) 05/09/2017    Endometriosis of vagina 09/19/2016    Hashimoto's thyroiditis 09/19/2016    Vitamin D deficiency 09/19/2016    Dyslipidemia 09/19/2016    Migraine without aura and without status migrainosus, not intractable 09/19/2016    HSV infection 09/19/2016    Knee osteoarthritis 09/12/2014    S/P rotator cuff repair 02/27/2014       Past Surgical History:  Past Surgical History:   Procedure Laterality Date    CO TOTAL HIP ARTHROPLASTY Right 10/17/2022    Procedure: RIGHT HIP TOTAL ARTHROPLASTY;  Surgeon: Sin Soto M.D.;  Location: [facility];  Service: Orthopedics    CO LAP,INGUINAL HERNIA REPR,INITIAL Bilateral 6/6/2022    Procedure: REPAIR, HERNIA, INGUINAL, ROBOT-ASSISTED, USING DA DAMARI XI - FOR FEMORAL BILATERAL;  Surgeon: Johnny Mcmahon M.D.;  Location: SURGERY [facility];  Service: Gen  Robotic    DC NEUROPLASTY & OR TRANSPOS MEDIAN NRV CARPAL DEYVI Right 04/19/2022    Procedure: RIGHT OPEN CARPAL TUNNEL RELEASE;  Surgeon: Seun Fleming M.D.;  Location: [facility];  Service: Orthopedics    PB INCISE FINGER TENDON SHEATH Right 04/19/2022    Procedure: RIGHT THUMB AND MIDDLE FINGER TRIGGER RELEASE;  Surgeon: Seun Fleming M.D.;  Location: [facility];  Service: Orthopedics    THYROID LOBECTOMY  08/2021    COLONOSCOPY  2021    KNEE ARTHROPLASTY TOTAL Left 10/23/2015    Procedure: LEFT TOTAL KNEE REPLACEMENT;  Surgeon: Leeroy Camacho M.D.;  Location: SURGERY [facility];  Service:     KNEE ARTHROPLASTY TOTAL  09/12/2014    Performed by Leeroy Camacho M.D. at SURGERY [facility]    SHOULDER ARTHROSCOPY Left 2013    SHOULDER ARTHROSCOPY Right 2012    TIFFANY BY LAPAROSCOPY  2009    PRIMARY C SECTION  10/31/1992    LUMPECTOMY Right 1972    benign    DC ANESTH,NANCY DEL AFTER NEURAXIAL ANESTH      TONSILLECTOMY      as child        Allergies:  Gluten, Morphine, Pcn [penicillins], and Percocet [oxycodone-acetaminophen]     Current Medications:    Current Outpatient Medications:     acyclovir (ZOVIRAX) 400 MG tablet, Take 1 Tablet by mouth 3 times a day., Disp: 30 Tablet, Rfl: 3    azithromycin (ZITHROMAX) 500 MG tablet, Take 1 Tablet by mouth one time before surgery., Disp: 1 Tablet, Rfl: 3    SYNTHROID 137 MCG Tab, TAKE 1 TABLET BY MOUTH EVERY DAY IN THE MORNING ON AN EMPTY STOMACH, Disp: 90 Tablet, Rfl: 3    omeprazole (PRILOSEC) 40 MG delayed-release capsule, Take 40 mg by mouth every 48 hours. EVERY OTHER DAY, Disp: , Rfl:     Non Formulary Request, Take  by mouth 1 time a day as needed. CBD GUMMIES, Disp: , Rfl:     albuterol 108 (90 Base) MCG/ACT Aero Soln inhalation aerosol, Inhale 2 Puffs every four hours as needed for Shortness of Breath (cough and wheezing)., Disp: 1 Each, Rfl: 3    Social History:  Social History     Socioeconomic History    Marital status:       Spouse name: Not on file    Number of children: Not on file    Years of education: Not on file    Highest education level: Bachelor's degree (e.g., BA, AB, BS)   Occupational History    Not on file   Tobacco Use    Smoking status: Former     Packs/day: 0.25     Years: 10.00     Pack years: 2.50     Types: Cigarettes     Quit date: 1980     Years since quittin.5    Smokeless tobacco: Never   Vaping Use    Vaping Use: Never used   Substance and Sexual Activity    Alcohol use: Yes     Alcohol/week: 2.5 oz     Types: 5 Standard drinks or equivalent per week     Comment: occas    Drug use: Not Currently     Types: Oral     Comment: CBD gummies daily    Sexual activity: Yes     Partners: Male   Other Topics Concern    Not on file   Social History Narrative    Not on file     Social Determinants of Health     Financial Resource Strain: Low Risk  (2023)    Overall Financial Resource Strain (CARDIA)     Difficulty of Paying Living Expenses: Not hard at all   Food Insecurity: No Food Insecurity (2023)    Hunger Vital Sign     Worried About Running Out of Food in the Last Year: Never true     Ran Out of Food in the Last Year: Never true   Transportation Needs: No Transportation Needs (2023)    PRAPARE - Transportation     Lack of Transportation (Medical): No     Lack of Transportation (Non-Medical): No   Physical Activity: Insufficiently Active (2023)    Exercise Vital Sign     Days of Exercise per Week: 3 days     Minutes of Exercise per Session: 30 min   Stress: Stress Concern Present (2023)    [place] citizen [place] [employer] - Occupational Stress Questionnaire     Feeling of Stress : Rather much   Social Connections: Socially Integrated (2023)    Social Connection and Isolation Panel [NHANES]     Frequency of Communication with Friends and Family: Twice a week     Frequency of Social Gatherings with Friends and Family: Once a week     Attends Zoroastrianism Services: More  "than 4 times per year     Active Member of Clubs or Organizations: Yes     Attends Club or Organization Meetings: More than 4 times per year     Marital Status:    Intimate Partner Violence: Not on file   Housing Stability: Unknown (4/5/2023)    Housing Stability Vital Sign     Unable to Pay for Housing in the Last Year: No     Number of Places Lived in the Last Year: Not on file     Unstable Housing in the Last Year: No        Family History:   Family History   Problem Relation Age of Onset    Diabetes Mother     Arthritis Mother     Lung Disease Mother         COPD    Cancer Mother         cervical    Heart Disease Father     Alcohol/Drug Father     Cancer Sister         thyroid    Heart Disease Brother          PHYSICAL EXAM:   Vital signs: /70   Pulse 88   Ht 1.618 m (5' 3.7\")   Wt 89.8 kg (198 lb)   SpO2 99%   BMI 34.31 kg/m²   GENERAL: Well-developed, well-nourished  in no apparent distress.   EYE: No ocular and eyelid asymmetry, Anicteric sclerae,  PERRL  HENT: Hearing grossly intact, Normocephalic, atraumatic. Pink, moist mucous membranes, No exudate  NECK: Supple. Trachea midline. no nodules felt  CARDIOVASCULAR: Regular rate and rhythm. No murmurs, rubs, or gallops.   LUNGS: Clear to auscultation bilaterally   ABDOMEN: Soft, nontender with positive bowel sounds.   EXTREMITIES: No clubbing, cyanosis, or edema.   NEUROLOGICAL: Cranial nerves II-XII are grossly intact   Symmetric reflexes at the patella no proximal muscle weakness  LYMPH: No cervical, supraclavicular,  adenopathy palpated.   SKIN: No rashes, lesions. Turgor is normal.    Labs:  Lab Results   Component Value Date/Time    WBC 5.2 04/07/2023 11:07 AM    RBC 4.62 04/07/2023 11:07 AM    HEMOGLOBIN 14.5 04/07/2023 11:07 AM    MCV 93.7 04/07/2023 11:07 AM    MCH 31.4 04/07/2023 11:07 AM    MCHC 33.5 (L) 04/07/2023 11:07 AM    RDW 46.4 04/07/2023 11:07 AM    MPV 10.3 04/07/2023 11:07 AM       Lab Results   Component Value Date/Time "    SODIUM 147 (H) 04/07/2023 11:07 AM    POTASSIUM 4.5 04/07/2023 11:07 AM    CHLORIDE 111 04/07/2023 11:07 AM    CO2 24 04/07/2023 11:07 AM    ANION 12.0 04/07/2023 11:07 AM    GLUCOSE 93 04/07/2023 11:07 AM    BUN 15 04/07/2023 11:07 AM    CREATININE 0.63 04/07/2023 11:07 AM    CALCIUM 9.3 04/07/2023 11:07 AM    ASTSGOT 23 04/07/2023 11:07 AM    ALTSGPT 26 04/07/2023 11:07 AM    TBILIRUBIN 0.5 04/07/2023 11:07 AM    ALBUMIN 4.2 04/07/2023 11:07 AM    TOTPROTEIN 6.7 04/07/2023 11:07 AM    GLOBULIN 2.5 04/07/2023 11:07 AM    AGRATIO 1.7 04/07/2023 11:07 AM       Lab Results   Component Value Date/Time    CHOLSTRLTOT 203 (H) 04/07/2023 1107    TRIGLYCERIDE 73 04/07/2023 1107    HDL 61 04/07/2023 1107     (H) 04/07/2023 1107    CHOLHDLRAT 3.2 09/23/2016 0826    NONHDL 113.0 07/11/2014 0845       Lab Results   Component Value Date/Time    TSHULTRASEN 4.290 04/07/2023 1107     Lab Results   Component Value Date/Time    FREET4 1.73 (H) 04/07/2023 1107     No results found for: FREET3  No results found for: THYSTIMIG    Lab Results   Component Value Date/Time    MICROSOMALA <9.0 10/27/2021 0920         Imaging:      ASSESSMENT/PLAN:     1. Hashimoto's thyroiditis  Unstable  I will have patient get her blood work done by next week and message me for dose adjustment.  Patient understands to take levothyroxine on empty stomach prior to breakfast and wait 30 mins to eat  Patient understands to stop taking multivitamin 5 days prior to blood work.   - TSH; Future  - T3 FREE; Future  - FREE THYROXINE; Future    2. Follicular thyroid cancer (HCC)  Stable  Discussed US of thyroid from 4/2023: Results showed: The right lobe of the thyroid gland measures 0.6 cm x 2.4 cm x 0.8 cm. On comparison it measured 0.5 x 1.3 x 0.6 cm. The contour and echogenicity are normal. No focal mass lesions are identified.     The left lobe of the thyroid gland has been resected with no residual thyroid tissue seen.     No lymphadenopathy is  noted  We will continue to monitor. Next US of thyroid in 2024  - THYROGLOBULIN, QT; Future    3. Vitamin D deficiency  Unstable  I will get updated vitamin D levels for further evaluation  - VITAMIN D,25 HYDROXY (DEFICIENCY); Future     Return in about 3 months (around 8/22/2023). Patient will have blood work done by next for dose adjustment prior to next apt in 3 months.     Thank you kindly for allowing me to participate in the thyroid care plan for this patient.    Lakhwinder Burciaga, DELON   05/22/23    CC:   Peyton Miramontes M.D.

## 2023-05-27 LAB
THYROGLOB AB SERPL-ACNC: 8.3 IU/ML (ref 0–4)
THYROGLOB SERPL-MCNC: <0.5 NG/ML (ref 1.3–31.8)
THYROGLOB SERPL-MCNC: ABNORMAL NG/ML (ref 1.3–31.8)

## 2023-06-08 ENCOUNTER — HOSPITAL ENCOUNTER (OUTPATIENT)
Dept: LAB | Facility: MEDICAL CENTER | Age: 68
End: 2023-06-08
Attending: FAMILY MEDICINE
Payer: MEDICARE

## 2023-06-08 ENCOUNTER — OFFICE VISIT (OUTPATIENT)
Dept: MEDICAL GROUP | Facility: LAB | Age: 68
End: 2023-06-08
Payer: MEDICARE

## 2023-06-08 VITALS
RESPIRATION RATE: 12 BRPM | SYSTOLIC BLOOD PRESSURE: 102 MMHG | OXYGEN SATURATION: 96 % | DIASTOLIC BLOOD PRESSURE: 68 MMHG | WEIGHT: 188 LBS | HEART RATE: 76 BPM | HEIGHT: 63 IN | TEMPERATURE: 97 F | BODY MASS INDEX: 33.31 KG/M2

## 2023-06-08 DIAGNOSIS — R19.7 ACUTE DIARRHEA: ICD-10-CM

## 2023-06-08 DIAGNOSIS — R10.9 RIGHT FLANK PAIN: ICD-10-CM

## 2023-06-08 LAB
ALBUMIN SERPL BCP-MCNC: 4.4 G/DL (ref 3.2–4.9)
ALBUMIN/GLOB SERPL: 1.8 G/DL
ALP SERPL-CCNC: 123 U/L (ref 30–99)
ALT SERPL-CCNC: 72 U/L (ref 2–50)
ANION GAP SERPL CALC-SCNC: 15 MMOL/L (ref 7–16)
APPEARANCE UR: CLEAR
AST SERPL-CCNC: 51 U/L (ref 12–45)
BASOPHILS # BLD AUTO: 0.5 % (ref 0–1.8)
BASOPHILS # BLD: 0.02 K/UL (ref 0–0.12)
BILIRUB SERPL-MCNC: 0.5 MG/DL (ref 0.1–1.5)
BILIRUB UR STRIP-MCNC: NORMAL MG/DL
BUN SERPL-MCNC: 12 MG/DL (ref 8–22)
CALCIUM ALBUM COR SERPL-MCNC: 9.4 MG/DL (ref 8.5–10.5)
CALCIUM SERPL-MCNC: 9.7 MG/DL (ref 8.5–10.5)
CHLORIDE SERPL-SCNC: 104 MMOL/L (ref 96–112)
CO2 SERPL-SCNC: 23 MMOL/L (ref 20–33)
COLOR UR AUTO: YELLOW
CREAT SERPL-MCNC: 0.67 MG/DL (ref 0.5–1.4)
EOSINOPHIL # BLD AUTO: 0.09 K/UL (ref 0–0.51)
EOSINOPHIL NFR BLD: 2.4 % (ref 0–6.9)
ERYTHROCYTE [DISTWIDTH] IN BLOOD BY AUTOMATED COUNT: 45.9 FL (ref 35.9–50)
GFR SERPLBLD CREATININE-BSD FMLA CKD-EPI: 96 ML/MIN/1.73 M 2
GLOBULIN SER CALC-MCNC: 2.4 G/DL (ref 1.9–3.5)
GLUCOSE SERPL-MCNC: 87 MG/DL (ref 65–99)
GLUCOSE UR STRIP.AUTO-MCNC: NORMAL MG/DL
HCT VFR BLD AUTO: 48.8 % (ref 37–47)
HGB BLD-MCNC: 16 G/DL (ref 12–16)
IMM GRANULOCYTES # BLD AUTO: 0.02 K/UL (ref 0–0.11)
IMM GRANULOCYTES NFR BLD AUTO: 0.5 % (ref 0–0.9)
KETONES UR STRIP.AUTO-MCNC: NORMAL MG/DL
LEUKOCYTE ESTERASE UR QL STRIP.AUTO: NORMAL
LIPASE SERPL-CCNC: 31 U/L (ref 11–82)
LYMPHOCYTES # BLD AUTO: 1.15 K/UL (ref 1–4.8)
LYMPHOCYTES NFR BLD: 31 % (ref 22–41)
MCH RBC QN AUTO: 31.4 PG (ref 27–33)
MCHC RBC AUTO-ENTMCNC: 32.8 G/DL (ref 32.2–35.5)
MCV RBC AUTO: 95.7 FL (ref 81.4–97.8)
MONOCYTES # BLD AUTO: 0.49 K/UL (ref 0–0.85)
MONOCYTES NFR BLD AUTO: 13.2 % (ref 0–13.4)
NEUTROPHILS # BLD AUTO: 1.94 K/UL (ref 1.82–7.42)
NEUTROPHILS NFR BLD: 52.4 % (ref 44–72)
NITRITE UR QL STRIP.AUTO: NORMAL
NRBC # BLD AUTO: 0 K/UL
NRBC BLD-RTO: 0 /100 WBC (ref 0–0.2)
PH UR STRIP.AUTO: 5.5 [PH] (ref 5–8)
PLATELET # BLD AUTO: 246 K/UL (ref 164–446)
PMV BLD AUTO: 9.9 FL (ref 9–12.9)
POTASSIUM SERPL-SCNC: 3.9 MMOL/L (ref 3.6–5.5)
PROT SERPL-MCNC: 6.8 G/DL (ref 6–8.2)
PROT UR QL STRIP: NORMAL MG/DL
RBC # BLD AUTO: 5.1 M/UL (ref 4.2–5.4)
RBC UR QL AUTO: NORMAL
SODIUM SERPL-SCNC: 142 MMOL/L (ref 135–145)
SP GR UR STRIP.AUTO: 1.02
UROBILINOGEN UR STRIP-MCNC: 1 MG/DL
WBC # BLD AUTO: 3.7 K/UL (ref 4.8–10.8)

## 2023-06-08 PROCEDURE — 36415 COLL VENOUS BLD VENIPUNCTURE: CPT

## 2023-06-08 PROCEDURE — 85025 COMPLETE CBC W/AUTO DIFF WBC: CPT

## 2023-06-08 PROCEDURE — 3074F SYST BP LT 130 MM HG: CPT | Performed by: FAMILY MEDICINE

## 2023-06-08 PROCEDURE — 83690 ASSAY OF LIPASE: CPT

## 2023-06-08 PROCEDURE — 81002 URINALYSIS NONAUTO W/O SCOPE: CPT | Performed by: FAMILY MEDICINE

## 2023-06-08 PROCEDURE — 99214 OFFICE O/P EST MOD 30 MIN: CPT | Performed by: FAMILY MEDICINE

## 2023-06-08 PROCEDURE — 3078F DIAST BP <80 MM HG: CPT | Performed by: FAMILY MEDICINE

## 2023-06-08 PROCEDURE — 80053 COMPREHEN METABOLIC PANEL: CPT

## 2023-06-08 RX ORDER — FREMANEZUMAB-VFRM 225 MG/1.5ML
INJECTION SUBCUTANEOUS
COMMUNITY
End: 2023-08-31

## 2023-06-08 ASSESSMENT — FIBROSIS 4 INDEX: FIB4 SCORE: 1.19

## 2023-06-08 NOTE — PROGRESS NOTES
"Subjective:     Chief Complaint   Patient presents with    GI Problem     X sunday         HPI:   Corrinne presents today with Diarrhea, low grade fever, body aches, and nausea. COVID testing 3 days ago negative.   Right flank pain present as well. Clamed down but still there.   No blood in urine, or burning with peeing.   No sick contacts that she is aware of.   Appetite down.   BRAT diet. Only water.   Very fatigued.           Current Outpatient Medications Ordered in Epic   Medication Sig Dispense Refill    acyclovir (ZOVIRAX) 400 MG tablet Take 1 Tablet by mouth 3 times a day. 30 Tablet 3    SYNTHROID 137 MCG Tab TAKE 1 TABLET BY MOUTH EVERY DAY IN THE MORNING ON AN EMPTY STOMACH 90 Tablet 3    omeprazole (PRILOSEC) 40 MG delayed-release capsule Take 40 mg by mouth every 48 hours. EVERY OTHER DAY      Non Formulary Request Take  by mouth 1 time a day as needed. CBD GUMMIES      albuterol 108 (90 Base) MCG/ACT Aero Soln inhalation aerosol Inhale 2 Puffs every four hours as needed for Shortness of Breath (cough and wheezing). 1 Each 3    azithromycin (ZITHROMAX) 500 MG tablet Take 1 Tablet by mouth one time before surgery. 1 Tablet 3     No current Epic-ordered facility-administered medications on file.         ROS:  Gen: no fevers/chills, no changes in weight  Eyes: no changes in vision  ENT: no sore throat, no hearing loss, no bloody nose  Pulm: no sob, no cough  CV: no chest pain, no palpitations  GI: no nausea/vomiting, no diarrhea  : no dysuria  MSk: no myalgias  Skin: no rash  Neuro: no headaches, no numbness/tingling  Heme/Lymph: no easy bruising      Objective:     Exam:  /68 (BP Location: Right arm, Patient Position: Sitting, BP Cuff Size: Adult)   Pulse 76   Temp 36.1 °C (97 °F)   Resp 12   Ht 1.6 m (5' 3\")   Wt 85.3 kg (188 lb)   SpO2 96%   BMI 33.30 kg/m²  Body mass index is 33.3 kg/m².    Gen: Alert and oriented, No apparent distress.  Neck: Neck is supple without " lymphadenopathy.  Lungs: Normal effort, CTA bilaterally, no wheezes, rhonchi, or rales  CV: Regular rate and rhythm. No murmurs, rubs, or gallops.  Ext: No clubbing, cyanosis, edema.  Right-sided CVA tenderness present.  She does have some diffuse tenderness in the abdomen as well.  No masses appreciated.    Assessment & Plan:     68 y.o. female with the following -     1. Right flank pain  Discussed urinalysis negative for blood or signs of infection.  We did discuss possibility for acute viral gastroenteritis causing most of her symptoms.  We will go ahead and get some further work-up to make sure that there is nothing that we need to get imaging for him.  - POCT Urinalysis    2. Acute diarrhea  Discussed getting her labs done today.  We did discuss continue with a brat diet and pushing plenty fluids.  She should also add a little bit of salt to her fluids to make sure that she is not becoming hyponatremic with all the water and the loss of sodium through diarrhea.  - CBC WITH DIFFERENTIAL; Future  - Comp Metabolic Panel; Future  - LIPASE; Future            No follow-ups on file.    Please note that this dictation was created using voice recognition software. I have made every reasonable attempt to correct obvious errors, but I expect that there are errors of grammar and possibly content that I did not discover before finalizing the note.

## 2023-06-09 ENCOUNTER — HOSPITAL ENCOUNTER (OUTPATIENT)
Dept: RADIOLOGY | Facility: MEDICAL CENTER | Age: 68
End: 2023-06-09
Attending: FAMILY MEDICINE
Payer: MEDICARE

## 2023-06-09 ENCOUNTER — HOSPITAL ENCOUNTER (OUTPATIENT)
Dept: LAB | Facility: MEDICAL CENTER | Age: 68
End: 2023-06-09
Attending: FAMILY MEDICINE
Payer: MEDICARE

## 2023-06-09 ENCOUNTER — APPOINTMENT (OUTPATIENT)
Dept: PHYSICAL THERAPY | Facility: REHABILITATION | Age: 68
End: 2023-06-09
Attending: FAMILY MEDICINE
Payer: MEDICARE

## 2023-06-09 DIAGNOSIS — K75.9 HEPATITIS: ICD-10-CM

## 2023-06-09 DIAGNOSIS — R19.7 ACUTE DIARRHEA: ICD-10-CM

## 2023-06-09 LAB
HAV IGM SERPL QL IA: NORMAL
HBV CORE IGM SER QL: NORMAL
HBV SURFACE AG SER QL: NORMAL
HCV AB SER QL: NORMAL

## 2023-06-09 PROCEDURE — 74176 CT ABD & PELVIS W/O CONTRAST: CPT

## 2023-06-09 PROCEDURE — 80074 ACUTE HEPATITIS PANEL: CPT

## 2023-06-09 PROCEDURE — 36415 COLL VENOUS BLD VENIPUNCTURE: CPT

## 2023-06-20 ENCOUNTER — DOCUMENTATION (OUTPATIENT)
Dept: HEALTH INFORMATION MANAGEMENT | Facility: OTHER | Age: 68
End: 2023-06-20
Payer: MEDICARE

## 2023-06-27 RX ORDER — ALBUTEROL SULFATE 90 UG/1
2 AEROSOL, METERED RESPIRATORY (INHALATION) EVERY 4 HOURS PRN
Qty: 1 EACH | Refills: 3 | Status: SHIPPED | OUTPATIENT
Start: 2023-06-27

## 2023-06-27 NOTE — TELEPHONE ENCOUNTER
Received request via: Pharmacy    Was the patient seen in the last year in this department? Yes  6/8/23  Does the patient have an active prescription (recently filled or refills available) for medication(s) requested? No    Does the patient have [facility] Plus and need 100 day supply (blood pressure, diabetes and cholesterol meds only)? Medication is not for cholesterol, blood pressure or diabetes

## 2023-07-10 ENCOUNTER — TELEPHONE (OUTPATIENT)
Dept: HEALTH INFORMATION MANAGEMENT | Facility: OTHER | Age: 68
End: 2023-07-10
Payer: MEDICARE

## 2023-08-09 ENCOUNTER — HOSPITAL ENCOUNTER (OUTPATIENT)
Dept: LAB | Facility: MEDICAL CENTER | Age: 68
End: 2023-08-09
Payer: MEDICARE

## 2023-08-09 DIAGNOSIS — E06.3 HASHIMOTO'S THYROIDITIS: ICD-10-CM

## 2023-08-09 DIAGNOSIS — E55.9 VITAMIN D DEFICIENCY: ICD-10-CM

## 2023-08-09 LAB
25(OH)D3 SERPL-MCNC: 42 NG/ML (ref 30–100)
T3FREE SERPL-MCNC: 2.21 PG/ML (ref 2–4.4)
T4 FREE SERPL-MCNC: 1.83 NG/DL (ref 0.93–1.7)
TSH SERPL DL<=0.005 MIU/L-ACNC: 2.93 UIU/ML (ref 0.38–5.33)

## 2023-08-09 PROCEDURE — 36415 COLL VENOUS BLD VENIPUNCTURE: CPT

## 2023-08-09 PROCEDURE — 84443 ASSAY THYROID STIM HORMONE: CPT

## 2023-08-09 PROCEDURE — 82306 VITAMIN D 25 HYDROXY: CPT

## 2023-08-09 PROCEDURE — 84481 FREE ASSAY (FT-3): CPT

## 2023-08-09 PROCEDURE — 84439 ASSAY OF FREE THYROXINE: CPT

## 2023-08-21 ENCOUNTER — OFFICE VISIT (OUTPATIENT)
Dept: ENDOCRINOLOGY | Facility: MEDICAL CENTER | Age: 68
End: 2023-08-21
Payer: MEDICARE

## 2023-08-21 VITALS
OXYGEN SATURATION: 97 % | DIASTOLIC BLOOD PRESSURE: 90 MMHG | SYSTOLIC BLOOD PRESSURE: 144 MMHG | BODY MASS INDEX: 32.65 KG/M2 | HEIGHT: 65 IN | WEIGHT: 196 LBS | HEART RATE: 90 BPM

## 2023-08-21 DIAGNOSIS — E06.3 HASHIMOTO'S THYROIDITIS: ICD-10-CM

## 2023-08-21 DIAGNOSIS — E55.9 VITAMIN D DEFICIENCY: ICD-10-CM

## 2023-08-21 DIAGNOSIS — Z85.850 HISTORY OF THYROID CANCER: ICD-10-CM

## 2023-08-21 PROCEDURE — 3077F SYST BP >= 140 MM HG: CPT

## 2023-08-21 PROCEDURE — 99211 OFF/OP EST MAY X REQ PHY/QHP: CPT

## 2023-08-21 PROCEDURE — 99214 OFFICE O/P EST MOD 30 MIN: CPT

## 2023-08-21 PROCEDURE — 3080F DIAST BP >= 90 MM HG: CPT

## 2023-08-21 ASSESSMENT — FIBROSIS 4 INDEX: FIB4 SCORE: 1.64

## 2023-08-28 ENCOUNTER — TELEPHONE (OUTPATIENT)
Dept: MEDICAL GROUP | Facility: LAB | Age: 68
End: 2023-08-28
Payer: MEDICARE

## 2023-08-28 NOTE — TELEPHONE ENCOUNTER
ESTABLISHED PATIENT PRE-VISIT PLANNING     Patient was NOT contacted to complete PVP.     Note: Patient will not be contacted if there is no indication to call.     1.  Reviewed notes from the last few office visits within the medical group: Yes    2.  If any orders were placed at last visit or intended to be done for this visit (i.e. 6 mos follow-up), do we have Results/Consult Notes?           Labs - Labs ordered, completed on 6/8/23 and results are in chart.  Note: If patient appointment is for lab review and patient did not complete labs, check with provider if OK to reschedule patient until labs completed.         Imaging - Imaging ordered, completed and results are in chart.         Referrals - No referrals were ordered at last office visit.    3. Is this appointment scheduled as a Hospital Follow-Up? No    4.  Immunizations were updated in Epic using Reconcile Outside Information activity? Yes    5.  Patient is due for the following Health Maintenance Topics:   Health Maintenance Due   Topic Date Due    COVID-19 Vaccine (3 - Pfizer series) 05/17/2021     6.  AHA (Pulse8) form printed for Provider? N/A

## 2023-08-31 ENCOUNTER — OFFICE VISIT (OUTPATIENT)
Dept: MEDICAL GROUP | Facility: LAB | Age: 68
End: 2023-08-31
Payer: MEDICARE

## 2023-08-31 ENCOUNTER — HOSPITAL ENCOUNTER (OUTPATIENT)
Dept: LAB | Facility: MEDICAL CENTER | Age: 68
End: 2023-08-31
Attending: FAMILY MEDICINE
Payer: MEDICARE

## 2023-08-31 VITALS
RESPIRATION RATE: 14 BRPM | TEMPERATURE: 98.4 F | BODY MASS INDEX: 33.05 KG/M2 | OXYGEN SATURATION: 98 % | HEIGHT: 65 IN | SYSTOLIC BLOOD PRESSURE: 140 MMHG | DIASTOLIC BLOOD PRESSURE: 80 MMHG | WEIGHT: 198.4 LBS | HEART RATE: 102 BPM

## 2023-08-31 DIAGNOSIS — E66.9 OBESITY (BMI 30-39.9): ICD-10-CM

## 2023-08-31 DIAGNOSIS — I15.8 OTHER SECONDARY HYPERTENSION: ICD-10-CM

## 2023-08-31 LAB
ALBUMIN SERPL BCP-MCNC: 4.1 G/DL (ref 3.2–4.9)
ALBUMIN/GLOB SERPL: 2 G/DL
ALP SERPL-CCNC: 71 U/L (ref 30–99)
ALT SERPL-CCNC: 37 U/L (ref 2–50)
ANION GAP SERPL CALC-SCNC: 9 MMOL/L (ref 7–16)
APPEARANCE UR: CLEAR
AST SERPL-CCNC: 25 U/L (ref 12–45)
BILIRUB SERPL-MCNC: 0.2 MG/DL (ref 0.1–1.5)
BILIRUB UR QL STRIP.AUTO: NEGATIVE
BUN SERPL-MCNC: 18 MG/DL (ref 8–22)
CALCIUM ALBUM COR SERPL-MCNC: 9.1 MG/DL (ref 8.5–10.5)
CALCIUM SERPL-MCNC: 9.2 MG/DL (ref 8.5–10.5)
CHLORIDE SERPL-SCNC: 107 MMOL/L (ref 96–112)
CO2 SERPL-SCNC: 27 MMOL/L (ref 20–33)
COLOR UR: YELLOW
CREAT SERPL-MCNC: 0.73 MG/DL (ref 0.5–1.4)
GFR SERPLBLD CREATININE-BSD FMLA CKD-EPI: 90 ML/MIN/1.73 M 2
GLOBULIN SER CALC-MCNC: 2.1 G/DL (ref 1.9–3.5)
GLUCOSE SERPL-MCNC: 91 MG/DL (ref 65–99)
GLUCOSE UR STRIP.AUTO-MCNC: NEGATIVE MG/DL
KETONES UR STRIP.AUTO-MCNC: NEGATIVE MG/DL
LEUKOCYTE ESTERASE UR QL STRIP.AUTO: NEGATIVE
MICRO URNS: NORMAL
NITRITE UR QL STRIP.AUTO: NEGATIVE
PH UR STRIP.AUTO: 6 [PH] (ref 5–8)
POTASSIUM SERPL-SCNC: 4.5 MMOL/L (ref 3.6–5.5)
PROT SERPL-MCNC: 6.2 G/DL (ref 6–8.2)
PROT UR QL STRIP: NEGATIVE MG/DL
RBC UR QL AUTO: NEGATIVE
SODIUM SERPL-SCNC: 143 MMOL/L (ref 135–145)
SP GR UR STRIP.AUTO: >=1.03
UROBILINOGEN UR STRIP.AUTO-MCNC: 0.2 MG/DL

## 2023-08-31 PROCEDURE — 99214 OFFICE O/P EST MOD 30 MIN: CPT | Performed by: FAMILY MEDICINE

## 2023-08-31 PROCEDURE — 3079F DIAST BP 80-89 MM HG: CPT | Performed by: FAMILY MEDICINE

## 2023-08-31 PROCEDURE — 3077F SYST BP >= 140 MM HG: CPT | Performed by: FAMILY MEDICINE

## 2023-08-31 PROCEDURE — 36415 COLL VENOUS BLD VENIPUNCTURE: CPT

## 2023-08-31 PROCEDURE — 81003 URINALYSIS AUTO W/O SCOPE: CPT

## 2023-08-31 PROCEDURE — 80053 COMPREHEN METABOLIC PANEL: CPT

## 2023-08-31 ASSESSMENT — FIBROSIS 4 INDEX: FIB4 SCORE: 1.64

## 2023-08-31 NOTE — PROGRESS NOTES
"Subjective:     Chief Complaint   Patient presents with    Hypertension Follow-up         HPI:   Corrinne presents today with BP follow up. The only change of late has been some cortisone injections, shoulder and hip over the last 6 months or so.   Some HTN o dads side perhaps. She reports some palpitations, sometimes wakes her up, sometimes during the day.   Does snore. Possible apnea? Possibly in family too.     Has been doing walking and more hills of late.     Has had some increased swelling in legs, and tenderness in lower legs as well.     Current Outpatient Medications Ordered in Epic   Medication Sig Dispense Refill    Rizatriptan Benzoate (MAXALT PO)       albuterol 108 (90 Base) MCG/ACT Aero Soln inhalation aerosol Inhale 2 Puffs every four hours as needed for Shortness of Breath (cough and wheezing). 1 Each 3    acyclovir (ZOVIRAX) 400 MG tablet Take 1 Tablet by mouth 3 times a day. 30 Tablet 3    SYNTHROID 137 MCG Tab TAKE 1 TABLET BY MOUTH EVERY DAY IN THE MORNING ON AN EMPTY STOMACH 90 Tablet 3    omeprazole (PRILOSEC) 40 MG delayed-release capsule Take 40 mg by mouth every 48 hours. EVERY OTHER DAY      azithromycin (ZITHROMAX) 500 MG tablet Take 1 Tablet by mouth one time before surgery. 1 Tablet 3    Non Formulary Request Take  by mouth 1 time a day as needed. CBD GUMMIES       No current Epic-ordered facility-administered medications on file.         ROS:  Gen: no fevers/chills, no changes in weight  Eyes: no changes in vision  ENT: no sore throat, no hearing loss, no bloody nose  Pulm: no sob, no cough  CV: no chest pain, no palpitations  GI: no nausea/vomiting, no diarrhea  : no dysuria  MSk: no myalgias  Skin: no rash  Neuro: no headaches, no numbness/tingling  Heme/Lymph: no easy bruising      Objective:     Exam:  BP (!) 140/80   Pulse (!) 102   Temp 36.9 °C (98.4 °F)   Resp 14   Ht 1.651 m (5' 5\")   Wt 90 kg (198 lb 6.4 oz)   SpO2 98%   BMI 33.02 kg/m²  Body mass index is 33.02 " kg/m².    Gen: Alert and oriented, No apparent distress.  Neck: Neck is supple without lymphadenopathy.  Lungs: Normal effort, CTA bilaterally, no wheezes, rhonchi, or rales  CV: Regular rate and rhythm. No murmurs, rubs, or gallops.  Ext: No clubbing, cyanosis, edema.      Assessment & Plan:     68 y.o. female with the following -     1. Obesity (BMI 30-39.9)  Fairly stable at this point.  She did lose about 10 pounds previously this year but has gained it back.  She does continue to be active is much as possible.  We did discuss that her blood pressure may be just a factor of age and timing as well as the amount of time that she has been at this weight.  There also could be some steroid effect with recent injections that could be contributing.    2. Other secondary hypertension  We will go and make sure that there is no other issues that were missing like electrolyte abnormality or kidney dysfunction and then consider treating just to protect her heart and brain and eyes and kidneys etc. from high blood pressure while she is working on some of her musculoskeletal complaints.  May have her seen by cardio or have a Zio patch placed given these palpitations that she is reporting.  May also need a sleep study in the near future.  - Comp Metabolic Panel; Future  - URINALYSIS,CULTURE IF INDICATED; Future          No follow-ups on file.    Please note that this dictation was created using voice recognition software. I have made every reasonable attempt to correct obvious errors, but I expect that there are errors of grammar and possibly content that I did not discover before finalizing the note.

## 2023-09-01 RX ORDER — AMLODIPINE BESYLATE 5 MG/1
5 TABLET ORAL DAILY
Qty: 90 TABLET | Refills: 0 | Status: SHIPPED | OUTPATIENT
Start: 2023-09-01 | End: 2023-09-21

## 2023-09-19 ENCOUNTER — PATIENT MESSAGE (OUTPATIENT)
Dept: MEDICAL GROUP | Facility: LAB | Age: 68
End: 2023-09-19
Payer: MEDICARE

## 2023-09-21 RX ORDER — METOPROLOL SUCCINATE 25 MG/1
25 TABLET, EXTENDED RELEASE ORAL DAILY
Qty: 90 TABLET | Refills: 0 | Status: SHIPPED | OUTPATIENT
Start: 2023-09-21 | End: 2023-12-18

## 2023-10-06 ENCOUNTER — PATIENT MESSAGE (OUTPATIENT)
Dept: MEDICAL GROUP | Facility: LAB | Age: 68
End: 2023-10-06
Payer: MEDICARE

## 2023-10-06 RX ORDER — LOSARTAN POTASSIUM 50 MG/1
50 TABLET ORAL DAILY
Qty: 30 TABLET | Refills: 1 | Status: SHIPPED | OUTPATIENT
Start: 2023-10-06 | End: 2023-10-09 | Stop reason: SDUPTHER

## 2023-10-09 RX ORDER — LOSARTAN POTASSIUM 50 MG/1
50 TABLET ORAL DAILY
Qty: 100 TABLET | Refills: 1 | Status: SHIPPED | OUTPATIENT
Start: 2023-10-09

## 2023-10-16 ENCOUNTER — PHARMACY VISIT (OUTPATIENT)
Dept: PHARMACY | Facility: MEDICAL CENTER | Age: 68
End: 2023-10-16
Payer: COMMERCIAL

## 2023-10-16 PROCEDURE — RXMED WILLOW AMBULATORY MEDICATION CHARGE: Performed by: INTERNAL MEDICINE

## 2023-10-23 ENCOUNTER — PATIENT MESSAGE (OUTPATIENT)
Dept: MEDICAL GROUP | Facility: LAB | Age: 68
End: 2023-10-23
Payer: MEDICARE

## 2023-11-02 ENCOUNTER — OFFICE VISIT (OUTPATIENT)
Dept: MEDICAL GROUP | Facility: LAB | Age: 68
End: 2023-11-02
Payer: MEDICARE

## 2023-11-02 ENCOUNTER — HOSPITAL ENCOUNTER (OUTPATIENT)
Dept: RADIOLOGY | Facility: MEDICAL CENTER | Age: 68
End: 2023-11-02
Attending: FAMILY MEDICINE
Payer: MEDICARE

## 2023-11-02 VITALS
RESPIRATION RATE: 12 BRPM | WEIGHT: 193 LBS | HEIGHT: 65 IN | BODY MASS INDEX: 32.15 KG/M2 | TEMPERATURE: 97.4 F | OXYGEN SATURATION: 96 % | SYSTOLIC BLOOD PRESSURE: 120 MMHG | DIASTOLIC BLOOD PRESSURE: 80 MMHG | HEART RATE: 78 BPM

## 2023-11-02 DIAGNOSIS — R07.81 RIB PAIN ON RIGHT SIDE: ICD-10-CM
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
## 2023-11-02 PROCEDURE — 71101 X-RAY EXAM UNILAT RIBS/CHEST: CPT | Mod: RT

## 2023-11-02 PROCEDURE — 3074F SYST BP LT 130 MM HG: CPT | Performed by: FAMILY MEDICINE

## 2023-11-02 PROCEDURE — 99213 OFFICE O/P EST LOW 20 MIN: CPT | Performed by: FAMILY MEDICINE

## 2023-11-02 PROCEDURE — 3079F DIAST BP 80-89 MM HG: CPT | Performed by: FAMILY MEDICINE

## 2023-11-02 RX ORDER — HYDROCODONE BITARTRATE AND ACETAMINOPHEN 5; 325 MG/1; MG/1
1 TABLET ORAL EVERY 8 HOURS PRN
Qty: 21 TABLET | Refills: 0 | Status: SHIPPED | OUTPATIENT
Start: 2023-11-02 | End: 2023-11-09

## 2023-11-02 ASSESSMENT — FIBROSIS 4 INDEX: FIB4 SCORE: 1.12

## 2023-11-02 NOTE — PROGRESS NOTES
Subjective:     Chief Complaint   Patient presents with    Rib Injury     Fell x monday         HPI:   Corrinne presents today with fall 3 days ago. Hit coffee table at home.   Laughing, oughing, sneezing, all hurts. Trying to put shoes on sneakers. Not sleeping much due to this. Cant lie on that side.   No productive cough, no fever.  She does note some swelling in the right anterior rib area.      Current Outpatient Medications Ordered in Epic   Medication Sig Dispense Refill    influenza Vac High-Dose Quad (FLUZONE) 0.7 ML Suspension Prefilled Syringe injection Inject  into the shoulder, thigh, or buttocks. 0.7 mL 0    losartan (COZAAR) 50 MG Tab Take 1 Tablet by mouth every day. 100 Tablet 1    metoprolol SR (TOPROL XL) 25 MG TABLET SR 24 HR Take 1 Tablet by mouth every day. 90 Tablet 0    Rizatriptan Benzoate (MAXALT PO)       albuterol 108 (90 Base) MCG/ACT Aero Soln inhalation aerosol Inhale 2 Puffs every four hours as needed for Shortness of Breath (cough and wheezing). 1 Each 3    acyclovir (ZOVIRAX) 400 MG tablet Take 1 Tablet by mouth 3 times a day. 30 Tablet 3    azithromycin (ZITHROMAX) 500 MG tablet Take 1 Tablet by mouth one time before surgery. 1 Tablet 3    SYNTHROID 137 MCG Tab TAKE 1 TABLET BY MOUTH EVERY DAY IN THE MORNING ON AN EMPTY STOMACH 90 Tablet 3    omeprazole (PRILOSEC) 40 MG delayed-release capsule Take 40 mg by mouth every 48 hours. EVERY OTHER DAY      Non Formulary Request Take  by mouth 1 time a day as needed. CBD GUMMIES       No current Epic-ordered facility-administered medications on file.         ROS:  Gen: no fevers/chills, no changes in weight  Eyes: no changes in vision  ENT: no sore throat, no hearing loss, no bloody nose  Pulm: no sob, no cough  CV: no chest pain, no palpitations  GI: no nausea/vomiting, no diarrhea  : no dysuria  MSk: no myalgias  Skin: no rash  Neuro: no headaches, no numbness/tingling  Heme/Lymph: no easy bruising      Objective:     Exam:  /80  "(BP Location: Left arm, Patient Position: Sitting, BP Cuff Size: Adult)   Pulse 78   Temp 36.3 °C (97.4 °F)   Resp 12   Ht 1.651 m (5' 5\")   Wt 87.5 kg (193 lb)   SpO2 96%   BMI 32.12 kg/m²  Body mass index is 32.12 kg/m².    Gen: Alert and oriented, No apparent distress.  Neck: Neck is supple without lymphadenopathy.  Lungs: Normal effort, CTA bilaterally, no wheezes, rhonchi, or rales  CV: Regular rate and rhythm. No murmurs, rubs, or gallops. Chest: Bruising at the right lower ribs anteriorly.  Very tender.  No step-offs or crepitus noted.  Ext: No clubbing, cyanosis, edema.  Bruising laterally at the right hip over the greater trochanteric area.      Assessment & Plan:     68 y.o. female with the following -     1. Fall, initial encounter  Discussed possibility for rib injury.  We did discuss that pain control typically is the treatment for this.  We did discuss that sometimes it can take 8 to 12 weeks for full healing.  We will get some x-rays for further evaluation and try some hydrocodone for pain.  We did discuss the risks of pneumonia if she is not getting full breaths.  Discussed not splinting the area as well for risk of this.  We will let her know her x-rays are looking and if we need further imaging with CT scan.  - [id number] (WITH 1-VIEW CXR) RIGHT; Future  - HYDROcodone-acetaminophen (NORCO) 5-325 MG Tab per tablet; Take 1 Tablet by mouth every 8 hours as needed (rib pain) for up to 7 days.  Dispense: 21 Tablet; Refill: 0    2. Rib pain on right side    - [id number] (WITH 1-VIEW CXR) RIGHT; Future  - HYDROcodone-acetaminophen (NORCO) 5-325 MG Tab per tablet; Take 1 Tablet by mouth every 8 hours as needed (rib pain) for up to 7 days.  Dispense: 21 Tablet; Refill: 0          No follow-ups on file.    Please note that this dictation was created using voice recognition software. I have made every reasonable attempt to correct obvious errors, but I expect that there are errors of " grammar and possibly content that I did not discover before finalizing the note.

## 2023-11-10 ENCOUNTER — HOSPITAL ENCOUNTER (OUTPATIENT)
Facility: MEDICAL CENTER | Age: 68
End: 2023-11-10
Attending: PHYSICIAN ASSISTANT
Payer: MEDICARE

## 2023-11-10 ENCOUNTER — APPOINTMENT (OUTPATIENT)
Dept: RADIOLOGY | Facility: IMAGING CENTER | Age: 68
End: 2023-11-10
Attending: PHYSICIAN ASSISTANT
Payer: MEDICARE

## 2023-11-10 ENCOUNTER — OFFICE VISIT (OUTPATIENT)
Dept: URGENT CARE | Facility: CLINIC | Age: 68
End: 2023-11-10
Payer: MEDICARE

## 2023-11-10 VITALS
SYSTOLIC BLOOD PRESSURE: 132 MMHG | OXYGEN SATURATION: 97 % | RESPIRATION RATE: 16 BRPM | DIASTOLIC BLOOD PRESSURE: 82 MMHG | WEIGHT: 191 LBS | BODY MASS INDEX: 31.82 KG/M2 | HEIGHT: 65 IN | HEART RATE: 94 BPM | TEMPERATURE: 98.2 F

## 2023-11-10 DIAGNOSIS — M25.572 ACUTE LEFT ANKLE PAIN: ICD-10-CM

## 2023-11-10 DIAGNOSIS — N30.00 ACUTE CYSTITIS WITHOUT HEMATURIA: ICD-10-CM

## 2023-11-10 PROCEDURE — 87077 CULTURE AEROBIC IDENTIFY: CPT | Mod: 91

## 2023-11-10 PROCEDURE — 99214 OFFICE O/P EST MOD 30 MIN: CPT | Mod: 25 | Performed by: PHYSICIAN ASSISTANT

## 2023-11-10 PROCEDURE — 3075F SYST BP GE 130 - 139MM HG: CPT | Performed by: PHYSICIAN ASSISTANT

## 2023-11-10 PROCEDURE — 87186 SC STD MICRODIL/AGAR DIL: CPT

## 2023-11-10 PROCEDURE — 73610 X-RAY EXAM OF ANKLE: CPT | Mod: TC,FY,LT | Performed by: RADIOLOGY

## 2023-11-10 PROCEDURE — 3079F DIAST BP 80-89 MM HG: CPT | Performed by: PHYSICIAN ASSISTANT

## 2023-11-10 PROCEDURE — 87086 URINE CULTURE/COLONY COUNT: CPT

## 2023-11-10 RX ORDER — CEFDINIR 300 MG/1
300 CAPSULE ORAL 2 TIMES DAILY
Qty: 10 CAPSULE | Refills: 0 | Status: SHIPPED | OUTPATIENT
Start: 2023-11-10 | End: 2023-11-15

## 2023-11-10 ASSESSMENT — ENCOUNTER SYMPTOMS
CHILLS: 0
FEVER: 0

## 2023-11-10 ASSESSMENT — FIBROSIS 4 INDEX: FIB4 SCORE: 1.12

## 2023-11-10 NOTE — PROGRESS NOTES
Subjective:   Corrinne Lynne Cibulsky is a 68 y.o. female who presents today with   Chief Complaint   Patient presents with    Blood in Urine     X 2 days, possible blood in urine, pain. History of kidney stone.    Ankle Swelling     Today, swollen LT ankle.     Dysuria   This is a new problem. Episode onset: 2 days. The problem occurs every urination. The problem has been unchanged. The quality of the pain is described as burning. The pain is mild. There has been no fever. Associated symptoms include hematuria. Pertinent negatives include no chills.     Patient states she did have a fall a little over a week ago and had x-rays completed regarding her ribs but today started noticing more ankle pain.    PMH:  has a past medical history of Anesthesia, Arthritis, Asthma (07/11/2014), Blood clotting disorder ([facility]) (11/24/2021), Breath shortness, Bronchitis, Cancer ([facility]) (08/04/2021), Carpal tunnel syndrome on left (10/13/2021), Chest pain (07/10/2014), COVID-19 (11/08/2021), Current mild episode of major depressive disorder without prior episode ([facility]) (8/27/2020), Endometriosis of vagina (09/19/2016), Epigastric pain (10/09/2018), Episodic altered awareness (10/13/2020), Follicular thyroid cancer (HCC) (08/04/2021), H/O migraine (07/11/2014), High cholesterol, Hoarseness or changing voice (01/11/2021), HSV infection (09/19/2016), Hyperlipidemia (09/19/2016), Hyperthyroidism, Hypokalemia (07/11/2014), Knee osteoarthritis (09/12/2014), 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    She has no past medical history of Arrhythmia, Cataract, Cold, Congestive heart failure (HCC), Coughing blood, Dental disorder, Glaucoma, Heart burn, Heart valve disease, Hepatitis A, Hepatitis B, Hepatitis C, Hypertension, Indigestion, Myocardial infarct (HCC), Other and unspecified angina pectoris, Other emphysema (HCC), Pacemaker, Pneumonia, Rheumatic fever, Seizure (HCC), Sleep apnea, Snoring, Tuberculosis, Type II or unspecified type diabetes mellitus without mention of complication, not stated as uncontrolled, Unspecified hemorrhagic conditions, or Unspecified urinary incontinence.  MEDS:   Current Outpatient Medications:     cefdinir (OMNICEF) 300 MG Cap, Take 1 Capsule by mouth 2 times a day for 5 days., Disp: 10 Capsule, Rfl: 0    losartan (COZAAR) 50 MG Tab, Take 1 Tablet by mouth every day., Disp: 100 Tablet, Rfl: 1    metoprolol SR (TOPROL XL) 25 MG TABLET SR 24 HR, Take 1 Tablet by mouth every day., Disp: 90 Tablet, Rfl: 0    Rizatriptan Benzoate (MAXALT PO), , Disp: , Rfl:     albuterol 108 (90 Base) MCG/ACT Aero Soln inhalation aerosol, Inhale 2 Puffs every four hours as needed for Shortness of Breath (cough and wheezing)., Disp: 1 Each, Rfl: 3    acyclovir (ZOVIRAX) 400 MG tablet, Take 1 Tablet by mouth 3 times a day., Disp: 30 Tablet, Rfl: 3    azithromycin (ZITHROMAX) 500 MG tablet, Take 1 Tablet by mouth one time before surgery., Disp: 1 Tablet, Rfl: 3    SYNTHROID 137 MCG Tab, TAKE 1 TABLET BY MOUTH EVERY DAY IN THE MORNING ON AN EMPTY STOMACH, Disp: 90 Tablet, Rfl: 3    omeprazole (PRILOSEC) 40 MG delayed-release capsule, Take 40 mg by mouth every 48 hours. EVERY OTHER DAY, Disp: , Rfl:     Non Formulary Request, Take  by mouth 1 time a day as needed. CBD GUMMIES, Disp: , Rfl:   ALLERGIES:   Allergies   Allergen Reactions    Gluten Meal Unspecified, Diarrhea and Nausea    Oxycodone-Acetaminophen  Unspecified    Gluten      GI upset    Morphine Vomiting    Pcn [Penicillins] Rash     Tolerates Ancef.    Percocet [Oxycodone-Acetaminophen] Unspecified     migraine  migraine     SURGHX:   Past Surgical History:   Procedure Laterality Date    SC TOTAL HIP ARTHROPLASTY Right 10/17/2022    Procedure: RIGHT HIP TOTAL ARTHROPLASTY;  Surgeon: Sin Soto M.D.;  Location: [facility];  Service: Orthopedics    SC LAP,INGUINAL HERNIA REPR,INITIAL Bilateral 6/6/2022    Procedure: REPAIR, HERNIA, INGUINAL, ROBOT-ASSISTED, USING DA DAMARI XI - FOR FEMORAL BILATERAL;  Surgeon: Johnny Mcmahon M.D.;  Location: SURGERY [facility];  Service: Gen Robotic    SC NEUROPLASTY & OR TRANSPOS MEDIAN NRV CARPAL DEYVI Right 04/19/2022    Procedure: RIGHT OPEN CARPAL TUNNEL RELEASE;  Surgeon: Seun Fleming M.D.;  Location: [facility];  Service: Orthopedics    PB INCISE FINGER TENDON SHEATH Right 04/19/2022    Procedure: RIGHT THUMB AND MIDDLE FINGER TRIGGER RELEASE;  Surgeon: Seun Fleming M.D.;  Location: [facility];  Service: Orthopedics    THYROID LOBECTOMY  08/2021    COLONOSCOPY  2021    KNEE ARTHROPLASTY TOTAL Left 10/23/2015    Procedure: LEFT TOTAL KNEE REPLACEMENT;  Surgeon: Leeroy Camacho M.D.;  Location: SURGERY [facility];  Service:     KNEE ARTHROPLASTY TOTAL  09/12/2014    Performed by Leeroy Camacho M.D. at SURGERY [facility]    SHOULDER ARTHROSCOPY Left 2013    SHOULDER ARTHROSCOPY Right 2012    TIFFANY BY LAPAROSCOPY  2009    PRIMARY C SECTION  10/31/1992    LUMPECTOMY Right 1972    benign    SC ANESTH,NANCY DEL AFTER NEURAXIAL ANESTH      TONSILLECTOMY      as child     SOCHX:  reports that she quit smoking about 43 years ago. Her smoking use included cigarettes. She started smoking about 53 years ago. She has a 2.5 pack-year smoking history. She has never used smokeless tobacco. She reports current alcohol use of about 2.5 oz of alcohol per week. She reports that  "she does not currently use drugs after having used the following drugs: Oral.  FH: Reviewed with patient, not pertinent to this visit.     Review of Systems   Constitutional:  Negative for chills and fever.   Genitourinary:  Positive for dysuria and hematuria.      Objective:   /82   Pulse 94   Temp 36.8 °C (98.2 °F) (Temporal)   Resp 16   Ht 1.651 m (5' 5\")   Wt 86.6 kg (191 lb)   SpO2 97%   BMI 31.78 kg/m²   Physical Exam  Vitals and nursing note reviewed.   Constitutional:       General: She is not in acute distress.     Appearance: Normal appearance. She is well-developed. She is not ill-appearing or toxic-appearing.   HENT:      Head: Normocephalic and atraumatic.      Right Ear: Hearing normal.      Left Ear: Hearing normal.   Cardiovascular:      Rate and Rhythm: Normal rate and regular rhythm.      Heart sounds: Normal heart sounds.   Pulmonary:      Effort: Pulmonary effort is normal.      Breath sounds: Normal breath sounds.   Abdominal:      Tenderness: There is no right CVA tenderness or left CVA tenderness.   Genitourinary:     Comments: Patient deferred  exam  Musculoskeletal:      Left ankle:      Left Achilles Tendon: Normal. No tenderness.        Legs:       Comments: Patient has full plantarflexion and dorsiflexion.  Does have more discomfort against resistance with plantarflexion.  Tenderness to palpation of the medial malleolus of the left ankle.  Mild bruising noted.  Mild swelling also noted.   Skin:     General: Skin is warm and dry.   Neurological:      Mental Status: She is alert.      Coordination: Coordination normal.   Psychiatric:         Mood and Affect: Mood normal.       DX ANKLE  FINDINGS:  There is normal bone mineralization of the left ankle. There is no evidence of fracture, dislocation, or osseous lesion.     IMPRESSION:     No radiographic evidence of acute traumatic injury.    Assessment/Plan:   Assessment    1. Acute cystitis without hematuria  - cefdinir " (OMNICEF) 300 MG Cap; Take 1 Capsule by mouth 2 times a day for 5 days.  Dispense: 10 Capsule; Refill: 0  - URINE CULTURE(NEW); Future    2. Acute left ankle pain  - DX-ANKLE 3+ VIEWS LEFT; Future    Symptoms and presentation are consistent with urinary tract infection which we will treat accordingly with antibiotics.  No concerning findings that would warrant any CT imaging at this time.  Given significant tenderness to palpation to the medial malleolus of the left ankle recommend obtaining x-ray of the ankle at this time.    No concerns on x ray regarding ankle. No signs of gout.   RICE TREATMENT FOR EXTREMITY INJURIES:  R-rest the extremity as much as possible while pain and swelling persist  I-ice the extremity 15 minutes every 2 hours for the first 24 hours, then 4-5 times daily   C-compress the extremity either with splint or ace wrap as directed  E-elevate the extremity to help with swelling    Differential diagnosis, natural history, supportive care, and indications for immediate follow-up discussed.   Patient given instructions and understanding of medications and treatment.    If not improving in 3-5 days, F/U with PCP or return to  if symptoms worsen.    Patient agreeable to plan.      Please note that this dictation was created using voice recognition software. I have made every reasonable attempt to correct obvious errors, but I expect that there are errors of grammar and possibly content that I did not discover before finalizing the note.    Rashaun Dias PA-C

## 2023-11-13 ENCOUNTER — HOSPITAL ENCOUNTER (OUTPATIENT)
Dept: LAB | Facility: MEDICAL CENTER | Age: 68
End: 2023-11-13
Payer: MEDICARE

## 2023-11-13 DIAGNOSIS — E06.3 HASHIMOTO'S THYROIDITIS: ICD-10-CM

## 2023-11-13 DIAGNOSIS — E55.9 VITAMIN D DEFICIENCY: ICD-10-CM

## 2023-11-13 DIAGNOSIS — Z85.850 HISTORY OF THYROID CANCER: ICD-10-CM

## 2023-11-13 LAB
25(OH)D3 SERPL-MCNC: 46 NG/ML (ref 30–100)
BACTERIA UR CULT: ABNORMAL
BACTERIA UR CULT: ABNORMAL
SIGNIFICANT IND 70042: ABNORMAL
SITE SITE: ABNORMAL
SOURCE SOURCE: ABNORMAL
T3FREE SERPL-MCNC: 2.77 PG/ML (ref 2–4.4)
T4 FREE SERPL-MCNC: 2.01 NG/DL (ref 0.93–1.7)
TSH SERPL DL<=0.005 MIU/L-ACNC: 0.32 UIU/ML (ref 0.38–5.33)

## 2023-11-13 PROCEDURE — 84481 FREE ASSAY (FT-3): CPT

## 2023-11-13 PROCEDURE — 84432 ASSAY OF THYROGLOBULIN: CPT

## 2023-11-13 PROCEDURE — 84443 ASSAY THYROID STIM HORMONE: CPT

## 2023-11-13 PROCEDURE — 86800 THYROGLOBULIN ANTIBODY: CPT

## 2023-11-13 PROCEDURE — 82306 VITAMIN D 25 HYDROXY: CPT

## 2023-11-13 PROCEDURE — 84439 ASSAY OF FREE THYROXINE: CPT

## 2023-11-13 PROCEDURE — 36415 COLL VENOUS BLD VENIPUNCTURE: CPT

## 2023-11-15 PROBLEM — G56.02 LEFT CARPAL TUNNEL SYNDROME: Status: ACTIVE | Noted: 2023-11-15

## 2023-11-19 LAB
THYROGLOB AB SERPL-ACNC: 9 IU/ML (ref 0–4)
THYROGLOB SERPL-MCNC: <0.5 NG/ML (ref 1.3–31.8)
THYROGLOB SERPL-MCNC: ABNORMAL NG/ML (ref 1.3–31.8)

## 2023-11-20 ENCOUNTER — OFFICE VISIT (OUTPATIENT)
Dept: ENDOCRINOLOGY | Facility: MEDICAL CENTER | Age: 68
End: 2023-11-20
Payer: MEDICARE

## 2023-11-20 VITALS
SYSTOLIC BLOOD PRESSURE: 122 MMHG | WEIGHT: 197 LBS | OXYGEN SATURATION: 97 % | BODY MASS INDEX: 33.63 KG/M2 | DIASTOLIC BLOOD PRESSURE: 76 MMHG | HEIGHT: 64 IN | HEART RATE: 64 BPM

## 2023-11-20 DIAGNOSIS — E06.3 HASHIMOTO'S THYROIDITIS: ICD-10-CM

## 2023-11-20 DIAGNOSIS — E55.9 VITAMIN D DEFICIENCY: ICD-10-CM

## 2023-11-20 DIAGNOSIS — Z85.850 HISTORY OF THYROID CANCER: ICD-10-CM

## 2023-11-20 PROCEDURE — 99214 OFFICE O/P EST MOD 30 MIN: CPT

## 2023-11-20 PROCEDURE — 99211 OFF/OP EST MAY X REQ PHY/QHP: CPT

## 2023-11-20 PROCEDURE — 3078F DIAST BP <80 MM HG: CPT

## 2023-11-20 PROCEDURE — 3074F SYST BP LT 130 MM HG: CPT

## 2023-11-20 ASSESSMENT — FIBROSIS 4 INDEX: FIB4 SCORE: 1.12

## 2023-11-20 NOTE — PROGRESS NOTES
Chief Complaint: Consult requested by Peyton Miramontes M.D. for evaluation of Hypothyroidism    HPI:     Corrinne Lynne Cibulsky is a 68 y.o. female with history of Hashimoto's disease diagnosed on 1996 and is here for initial evaluation. 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     1.Hashimoto's  Since last visit the following symptoms:  Fatigue- better, weight gain-stable, feeling cold and cold intolerance-better, sweating - better and feeling slow-worse     She denies constipation, swelling, losing hair, anxiousness, feeling excessive energy, tremulousness, palpitations, and weight loss.      She is currently on Synthroid 137 mcg 5 days a week and 2 tabs on day 6 and 7 which has been her thyroid hormone dose since 3 months.       She reports Good compliance and takes her thyroid hormone daily before breakfast.      She reports taking omeprazole antacid.   She denies taking biotin and multivitamin   Latest Reference Range & Units 11/13/23 13:00   TSH 0.380 - 5.330 uIU/mL 0.320 (L)   Free T-4 0.93 - 1.70 ng/dL 2.01 (H)   T3,Free 2.00 - 4.40 pg/mL 2.77       2. Thyroid nodule s/p left lobectomy  Denies: voice changes, difficulty swallowing, SOB  was diagnosed in March 2021  Surgery was done in August of 2022  -  Thyroid (Jan 2021): Right thyroid lobe measures 0.46 x 1.34 x 0.58 cm and left thyroid lobe measures 1.08 x 2.07 x 1.05 cm   Left mid thyroid nodule, solid, hypoechoic, 1.28 x 0.85 x 1.1 cm  - Feb 2021: FNA of left thyroid nodule done and pathology reported AUS with [facility] reporting suspicious and PAX8/PPARG positive picks adjusted risk of cancer of 75%. This genomic alteration is associated with follicular   neoplasms (FA, NIFTP, FVPTC, FTC) and a TROY-like or   Non-BRAF-Non-TROY-like profile, which includes rates of lymph node   metastases and extrathyroidal extension that are lower than BRAF   V600E-like neoplasms as per report  - March 2021: Ultrasound of soft tissues of head and neck  showed no cervical adenopathy  - April 2021: Left lobectomy performed by Dr. 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 The majority of the tumor showed a thin overall intact capsule, focally tumor extends into but does not clearly penetrate to the tumor capsule. No vascular invasion identified. Findings are insufficient for definitive diagnosis of malignancy as per report.  - Oct 2021: Tg <0.5, Tg Ab 6.8  - US thyroid (Nov 2021): showed left thyroidectomy and atrophic right thyroid lobe measures 1.4x0.4x0.8cm without any nodules and normal appearing cervical LNs <1 cm    -US of thyroid on 4/12/23: The right lobe of the thyroid gland measures 0.6 cm x 2.4 cm x 0.8 cm. On comparison it measured 0.5 x 1.3 x 0.6 cm. The contour and echogenicity are normal. No focal mass lesions are identified.     The left lobe of the thyroid gland has been resected with no residual thyroid tissue seen.     No lymphadenopathy is noted   Latest Reference Range & Units 11/13/23 13:00   Thyroglobulin by LC-MC/MS-S/P 1.3 - 31.8 ng/mL <0.5 (L)   Thyroglobulin 1.3 - 31.8 ng/mL Not Applicable      Latest Reference Range & Units 11/13/23 13:00   Anti-Thyroglobulin 0.0 - 4.0 IU/mL 9.0 (H)       3. Vitamin D deficiency  Currently on Viatmin D 3 2000 IU daily   Latest Reference Range & Units 11/13/23 13:00   25-Hydroxy   Vitamin D 25 30 - 100 ng/mL 46     Patient's medications, allergies, and social histories were reviewed and updated as appropriate.      ROS:     CONS:     No fever, no chills, no weight loss, no fatigue   EYES:      No diplopia, no blurry vision, no redness of eyes, no swelling of eyelids   ENT:    No hearing loss, No ear pain, No sore throat, no dysphagia, no neck swelling   CV:     No chest pain, no palpitations, no claudication, no orthopnea, no PND   PULM:    No SOB, no cough, no hemoptysis, no wheezing    GI:   No  nausea, no vomiting, no diarrhea, no constipation, no bloody stools   :  Passing urine well, no dysuria, no hematuria   ENDO:   No polyuria, no polydipsia, no heat intolerance, no cold intolerance   NEURO: No headaches, no dizziness, no convulsions, no tremors   MUSC:  No joint swellings, no arthralgias, no myalgias, no weakness   SKIN:   No rash, no ulcers, no dry skin   PSYCH:   No depression, no anxiety, no difficulty sleeping       Past Medical History:  Patient Active Problem List    Diagnosis Date Noted    Left carpal tunnel syndrome 11/15/2023    Functional diarrhea 04/07/2023    Trigger finger of right thumb 03/21/2022    Trigger finger, right middle finger 03/21/2022    History of DVT (deep vein thrombosis) 11/24/2021    History of COVID-19 11/08/2021    Bilateral carpal tunnel syndrome 08/04/2021    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    Lumbar radiculopathy 05/26/2020    Mild intermittent asthma without complication 05/14/2020    Dense breast tissue on mammogram 05/14/2020    Spondylolisthesis of lumbar region 01/07/2020    Obesity (BMI 30-39.9) 05/09/2017    Endometriosis of vagina 09/19/2016    Hashimoto's thyroiditis 09/19/2016    Vitamin D deficiency 09/19/2016    Dyslipidemia 09/19/2016    Migraine without aura and without status migrainosus, not intractable 09/19/2016    HSV infection 09/19/2016    Knee osteoarthritis 09/12/2014    S/P rotator cuff repair 02/27/2014       Past Surgical History:  Past Surgical History:   Procedure Laterality Date    MN TOTAL HIP ARTHROPLASTY Right 10/17/2022    Procedure: RIGHT HIP TOTAL ARTHROPLASTY;  Surgeon: Sin Soto M.D.;  Location: [facility];  Service: Orthopedics    MN LAP,INGUINAL HERNIA REPR,INITIAL Bilateral 6/6/2022    Procedure: REPAIR, HERNIA, INGUINAL, ROBOT-ASSISTED, USING DA DAMARI XI - FOR FEMORAL BILATERAL;  Surgeon: Johnny Mcmahon M.D.;  Location: SURGERY [facility];  Service: Gen Robotic    MN NEUROPLASTY & OR  TRANSPOS MEDIAN NRV CARPAL DEYVI Right 04/19/2022    Procedure: RIGHT OPEN CARPAL TUNNEL RELEASE;  Surgeon: Seun Fleming M.D.;  Location: [facility];  Service: Orthopedics    PB INCISE FINGER TENDON SHEATH Right 04/19/2022    Procedure: RIGHT THUMB AND MIDDLE FINGER TRIGGER RELEASE;  Surgeon: Seun Fleming M.D.;  Location: [facility];  Service: Orthopedics    THYROID LOBECTOMY  08/2021    COLONOSCOPY  2021    KNEE ARTHROPLASTY TOTAL Left 10/23/2015    Procedure: LEFT TOTAL KNEE REPLACEMENT;  Surgeon: Leeroy Camacho M.D.;  Location: SURGERY [facility];  Service:     KNEE ARTHROPLASTY TOTAL  09/12/2014    Performed by Leeroy Camacho M.D. at SURGERY [facility]    SHOULDER ARTHROSCOPY Left 2013    SHOULDER ARTHROSCOPY Right 2012    TIFFANY BY LAPAROSCOPY  2009    PRIMARY C SECTION  10/31/1992    LUMPECTOMY Right 1972    benign    NE ANESTH,NANCY DEL AFTER NEURAXIAL ANESTH      TONSILLECTOMY      as child        Allergies:  Gluten, Morphine, Pcn [penicillins], and Percocet [oxycodone-acetaminophen]     Current Medications:    Current Outpatient Medications:     losartan (COZAAR) 50 MG Tab, Take 1 Tablet by mouth every day., Disp: 100 Tablet, Rfl: 1    metoprolol SR (TOPROL XL) 25 MG TABLET SR 24 HR, Take 1 Tablet by mouth every day., Disp: 90 Tablet, Rfl: 0    Rizatriptan Benzoate (MAXALT PO), PRN, Disp: , Rfl:     albuterol 108 (90 Base) MCG/ACT Aero Soln inhalation aerosol, Inhale 2 Puffs every four hours as needed for Shortness of Breath (cough and wheezing)., Disp: 1 Each, Rfl: 3    acyclovir (ZOVIRAX) 400 MG tablet, Take 1 Tablet by mouth 3 times a day. (Patient taking differently: Take 400 mg by mouth 3 times a day. PRN), Disp: 30 Tablet, Rfl: 3    azithromycin (ZITHROMAX) 500 MG tablet, Take 1 Tablet by mouth one time before surgery., Disp: 1 Tablet, Rfl: 3    SYNTHROID 137 MCG Tab, TAKE 1 TABLET BY MOUTH EVERY DAY IN THE MORNING ON AN EMPTY STOMACH, Disp: 90 Tablet, Rfl: 3     omeprazole (PRILOSEC) 40 MG delayed-release capsule, Take 40 mg by mouth every 48 hours. EVERY OTHER DAY, Disp: , Rfl:     Non Formulary Request, Take  by mouth 1 time a day as needed. CBD GUMMIES, Disp: , Rfl:     Social History:  Social History     Socioeconomic History    Marital status:      Spouse name: Not on file    Number of children: Not on file    Years of education: Not on file    Highest education level: Bachelor's degree (e.g., BA, AB, BS)   Occupational History    Not on file   Tobacco Use    Smoking status: Former     Current packs/day: 0.00     Average packs/day: 0.3 packs/day for 10.0 years (2.5 ttl pk-yrs)     Types: Cigarettes     Start date: 1970     Quit date: 1980     Years since quittin.0    Smokeless tobacco: Never   Vaping Use    Vaping Use: Never used   Substance and Sexual Activity    Alcohol use: Yes     Alcohol/week: 2.5 oz     Types: 5 Standard drinks or equivalent per week     Comment: occas    Drug use: Not Currently     Types: Oral     Comment: CBD gummies daily    Sexual activity: Yes     Partners: Male   Other Topics Concern    Not on file   Social History Narrative    Not on file     Social Determinants of Health     Financial Resource Strain: Low Risk  (2023)    Overall Financial Resource Strain (CARDIA)     Difficulty of Paying Living Expenses: Not hard at all   Food Insecurity: No Food Insecurity (2023)    Hunger Vital Sign     Worried About Running Out of Food in the Last Year: Never true     Ran Out of Food in the Last Year: Never true   Transportation Needs: No Transportation Needs (2023)    PRAPARE - Transportation     Lack of Transportation (Medical): No     Lack of Transportation (Non-Medical): No   Physical Activity: Insufficiently Active (2023)    Exercise Vital Sign     Days of Exercise per Week: 3 days     Minutes of Exercise per Session: 30 min   Stress: Stress Concern Present (2023)    Citizen of [place] of Occupational  "Health - Occupational Stress Questionnaire     Feeling of Stress : Rather much   Social Connections: Socially Integrated (4/5/2023)    Social Connection and Isolation Panel [NHANES]     Frequency of Communication with Friends and Family: Twice a week     Frequency of Social Gatherings with Friends and Family: Once a week     Attends Taoist Services: More than 4 times per year     Active Member of Clubs or Organizations: Yes     Attends Club or Organization Meetings: More than 4 times per year     Marital Status:    Intimate Partner Violence: Not on file   Housing Stability: Unknown (4/5/2023)    Housing Stability Vital Sign     Unable to Pay for Housing in the Last Year: No     Number of Places Lived in the Last Year: Not on file     Unstable Housing in the Last Year: No        Family History:   Family History   Problem Relation Age of Onset    Diabetes Mother     Arthritis Mother     Lung Disease Mother         COPD    Cancer Mother         cervical    Heart Disease Father     Alcohol/Drug Father     Cancer Sister         thyroid    Heart Disease Brother          PHYSICAL EXAM:   Vital signs: /76 (BP Location: Right arm, Patient Position: Sitting, BP Cuff Size: Adult)   Pulse 64   Ht 1.626 m (5' 4\")   Wt 89.4 kg (197 lb)   SpO2 97%   BMI 33.81 kg/m²   GENERAL: Well-developed, well-nourished  in no apparent distress.   EYE: No ocular and eyelid asymmetry, Anicteric sclerae,  PERRL  HENT: Hearing grossly intact, Normocephalic, atraumatic. Pink, moist mucous membranes, No exudate  NECK: Supple. Trachea midline. no nodules felt  CARDIOVASCULAR: Regular rate and rhythm. No murmurs, rubs, or gallops.   LUNGS: Clear to auscultation bilaterally   ABDOMEN: Soft, nontender with positive bowel sounds.   EXTREMITIES: No clubbing, cyanosis, or edema.   NEUROLOGICAL: Cranial nerves II-XII are grossly intact   Symmetric reflexes at the patella no proximal muscle weakness  LYMPH: No cervical, supraclavicular,  " adenopathy palpated.   SKIN: No rashes, lesions. Turgor is normal.    Labs:  Lab Results   Component Value Date/Time    WBC 3.7 (L) 06/08/2023 09:16 AM    RBC 5.10 06/08/2023 09:16 AM    HEMOGLOBIN 16.0 06/08/2023 09:16 AM    MCV 95.7 06/08/2023 09:16 AM    MCH 31.4 06/08/2023 09:16 AM    MCHC 32.8 06/08/2023 09:16 AM    RDW 45.9 06/08/2023 09:16 AM    MPV 9.9 06/08/2023 09:16 AM       Lab Results   Component Value Date/Time    SODIUM 143 08/31/2023 09:11 AM    POTASSIUM 4.5 08/31/2023 09:11 AM    CHLORIDE 107 08/31/2023 09:11 AM    CO2 27 08/31/2023 09:11 AM    ANION 9.0 08/31/2023 09:11 AM    GLUCOSE 91 08/31/2023 09:11 AM    BUN 18 08/31/2023 09:11 AM    CREATININE 0.73 08/31/2023 09:11 AM    CALCIUM 9.2 08/31/2023 09:11 AM    ASTSGOT 25 08/31/2023 09:11 AM    ALTSGPT 37 08/31/2023 09:11 AM    TBILIRUBIN 0.2 08/31/2023 09:11 AM    ALBUMIN 4.1 08/31/2023 09:11 AM    TOTPROTEIN 6.2 08/31/2023 09:11 AM    GLOBULIN 2.1 08/31/2023 09:11 AM    AGRATIO 2.0 08/31/2023 09:11 AM       Lab Results   Component Value Date/Time    CHOLSTRLTOT 203 (H) 04/07/2023 1107    TRIGLYCERIDE 73 04/07/2023 1107    HDL 61 04/07/2023 1107     (H) 04/07/2023 1107    CHOLHDLRAT 3.2 09/23/2016 0826    NONHDL 113.0 07/11/2014 0845       Lab Results   Component Value Date/Time    TSHULTRASEN 4.290 04/07/2023 1107     Lab Results   Component Value Date/Time    FREET4 1.73 (H) 04/07/2023 1107     No results found for: FREET3  No results found for: THYSTIMIG    Lab Results   Component Value Date/Time    MICROSOMALA <9.0 10/27/2021 0920         Imaging:      ASSESSMENT/PLAN:   1. Hashimoto's thyroiditis  Unstable  TSH 0.32  Medication:  Synthroid 137 5 days 2 tabs on 2 days - change to synthroid 137 mcg 5 days and 2 tabs on 1 day and 1 1/2 tab on one day  Patient understands to take levothyroxine on empty stomach prior to breakfast and wait 30 mins to eat  Patient understands to stop taking multivitamin 5 days prior to blood work.     2.  History of thyroid cancer  Stable  Patient denies compressive symptoms  We will continue to monitor. Next US of thyroid in 2024  - TSH; Future  - FREE THYROXINE; Future  - T3 FREE; Future    3. Vitamin D deficiency  Stable  Continue regimen - see HPI     Return in about 6 weeks (around 1/1/2024). Patient will have blood work done 1 week prior to follow up in 6 weeks. .     Thank you kindly for allowing me to participate in the thyroid care plan for this patient.    Lakhwinder Burciaga, DELON   11/20/23    CC:   Peyton Miramontes M.D.

## 2023-11-27 PROBLEM — I10 HTN (HYPERTENSION): Status: ACTIVE | Noted: 2023-11-27

## 2023-12-18 RX ORDER — METOPROLOL SUCCINATE 25 MG/1
25 TABLET, EXTENDED RELEASE ORAL DAILY
Qty: 90 TABLET | Refills: 0 | Status: SHIPPED | OUTPATIENT
Start: 2023-12-18

## 2024-01-17 ENCOUNTER — PATIENT MESSAGE (OUTPATIENT)
Dept: MEDICAL GROUP | Facility: LAB | Age: 69
End: 2024-01-17
Payer: MEDICARE

## 2024-01-18 RX ORDER — PREDNISONE 20 MG/1
20 TABLET ORAL DAILY
Qty: 14 TABLET | Refills: 0 | Status: SHIPPED | OUTPATIENT
Start: 2024-01-18 | End: 2024-02-01

## 2024-01-19 DIAGNOSIS — E06.3 HASHIMOTO'S THYROIDITIS: ICD-10-CM

## 2024-01-19 RX ORDER — LEVOTHYROXINE SODIUM 137 MCG
137 TABLET ORAL
Qty: 90 TABLET | Refills: 0 | Status: SHIPPED | OUTPATIENT
Start: 2024-01-19

## 2024-07-19 ENCOUNTER — LAB REQUISITION (OUTPATIENT)
Dept: LAB | Facility: CLINIC | Age: 69
End: 2024-07-19

## 2024-07-19 PROCEDURE — 84155 ASSAY OF PROTEIN SERUM: CPT

## 2024-07-19 PROCEDURE — 84165 PROTEIN E-PHORESIS SERUM: CPT | Performed by: PATHOLOGY

## 2024-07-20 LAB — TOTAL PROTEIN SERUM FOR ELP: 6.2 G/DL (ref 6.4–8.3)

## 2024-07-22 LAB
ALBUMIN SERPL ELPH-MCNC: 3.8 G/DL (ref 3.7–5.1)
ALPHA1 GLOB SERPL ELPH-MCNC: 0.3 G/DL (ref 0.2–0.4)
ALPHA2 GLOB SERPL ELPH-MCNC: 0.7 G/DL (ref 0.5–0.9)
B-GLOBULIN SERPL ELPH-MCNC: 0.8 G/DL (ref 0.6–1)
GAMMA GLOB SERPL ELPH-MCNC: 0.6 G/DL (ref 0.7–1.6)
M PROTEIN SERPL ELPH-MCNC: 0 G/DL
PROT PATTERN SERPL ELPH-IMP: ABNORMAL

## (undated) DEVICE — CATHETER IV 18 GA X 1-3/4 ---SURG.& SDS ONLY---

## (undated) DEVICE — MASK ANESTHESIA ADULT  - (100/CA)

## (undated) DEVICE — DRAPE ARM  BOX OF 20

## (undated) DEVICE — SENSOR OXIMETER ADULT SPO2 RD SET (20EA/BX)

## (undated) DEVICE — SHEARS MONOPOLAR CURVED  DA VINCI 10X'S REUSABLE

## (undated) DEVICE — HEAD HOLDER JUNIOR/ADULT

## (undated) DEVICE — SUTURE GENERAL

## (undated) DEVICE — KIT ANESTHESIA W/CIRCUIT & 3/LT BAG W/FILTER (20EA/CA)

## (undated) DEVICE — LACTATED RINGERS INJ 1000 ML - (14EA/CA 60CA/PF)

## (undated) DEVICE — GOWN WARMING STANDARD FLEX - (30/CA)

## (undated) DEVICE — SUTURE 2-0 20CM STRATAFIX SPIRAL SH NEEDLE (12/BX)

## (undated) DEVICE — TUBE CONNECT SUCTION CLEAR 120 X 1/4" (50EA/CA)"

## (undated) DEVICE — SYSTEM CLEARIFY VISUALIZATION (10EA/PK)

## (undated) DEVICE — PROTECTOR ULNA NERVE - (36PR/CA)

## (undated) DEVICE — DRAPE COLUMN  BOX OF 20

## (undated) DEVICE — GLOVE BIOGEL PI INDICATOR SZ 8.0 SURGICAL PF LF -(50/BX 4BX/CA)

## (undated) DEVICE — ELECTRODE 850 FOAM ADHESIVE - HYDROGEL RADIOTRNSPRNT (50/PK)

## (undated) DEVICE — NEEDLE DRIVER MEGA SUTURECUT DA VINCI 15X'S REUSABLE

## (undated) DEVICE — COVER TIP ENDOWRIST HOT SHEAR - (10EA/BX) DA VINCI

## (undated) DEVICE — Device

## (undated) DEVICE — SUCTION INSTRUMENT YANKAUER BULBOUS TIP W/O VENT (50EA/CA)

## (undated) DEVICE — DERMABOND ADVANCED - (12EA/BX)

## (undated) DEVICE — ROBOTIC SURGERY SERVICES

## (undated) DEVICE — TUBING CLEARLINK DUO-VENT - C-FLO (48EA/CA)

## (undated) DEVICE — GLOVES, #7 1/2 SENSICARE

## (undated) DEVICE — BLADE SURGICAL #15 - (50/BX 3BX/CA)

## (undated) DEVICE — ELECTRODE DUAL RETURN W/ CORD - (50/PK)

## (undated) DEVICE — TOWEL STOP TIMEOUT SAFETY FLAG (40EA/CA)

## (undated) DEVICE — SEAL 5MM-8MM UNIVERSAL  BOX OF 10

## (undated) DEVICE — SODIUM CHL IRRIGATION 0.9% 1000ML (12EA/CA)

## (undated) DEVICE — SLEEVE, VASO, THIGH, MED

## (undated) DEVICE — SUTURE 4-0 MONOCRYL PLUS PS-2 - 27 INCH (36/BX)

## (undated) DEVICE — CANISTER SUCTION 3000ML MECHANICAL FILTER AUTO SHUTOFF MEDI-VAC NONSTERILE LF DISP  (40EA/CA)

## (undated) DEVICE — TROCAR 5X100 NON BLADED Z-TH - READ KII (6/BX)

## (undated) DEVICE — NEPTUNE 4 PORT MANIFOLD - (20/PK)

## (undated) DEVICE — OBTURATOR BLADELESS STANDARD 8MM (6EA/BX)

## (undated) DEVICE — SET EXTENSION WITH 2 PORTS (48EA/CA) ***PART #2C8610 IS A SUBSTITUTE*****